# Patient Record
Sex: MALE | Race: WHITE | NOT HISPANIC OR LATINO | Employment: OTHER | RURAL
[De-identification: names, ages, dates, MRNs, and addresses within clinical notes are randomized per-mention and may not be internally consistent; named-entity substitution may affect disease eponyms.]

---

## 2023-06-07 ENCOUNTER — HOSPITAL ENCOUNTER (INPATIENT)
Facility: HOSPITAL | Age: 83
LOS: 3 days | Discharge: HOME | DRG: 683 | End: 2023-06-10
Attending: FAMILY MEDICINE | Admitting: INTERNAL MEDICINE
Payer: COMMERCIAL

## 2023-06-07 DIAGNOSIS — E87.20 METABOLIC ACIDOSIS: ICD-10-CM

## 2023-06-07 DIAGNOSIS — K29.80 DUODENITIS: ICD-10-CM

## 2023-06-07 DIAGNOSIS — N17.9 AKI (ACUTE KIDNEY INJURY): ICD-10-CM

## 2023-06-07 DIAGNOSIS — E87.5 HYPERKALEMIA: ICD-10-CM

## 2023-06-07 DIAGNOSIS — N30.01 ACUTE CYSTITIS WITH HEMATURIA: ICD-10-CM

## 2023-06-07 DIAGNOSIS — N17.9 AKI (ACUTE KIDNEY INJURY): Primary | ICD-10-CM

## 2023-06-07 DIAGNOSIS — N17.9 ACUTE KIDNEY INJURY: ICD-10-CM

## 2023-06-07 DIAGNOSIS — R13.19 ESOPHAGEAL DYSPHAGIA: ICD-10-CM

## 2023-06-07 DIAGNOSIS — I47.10 SVT (SUPRAVENTRICULAR TACHYCARDIA): ICD-10-CM

## 2023-06-07 DIAGNOSIS — K44.9 HH (HIATUS HERNIA): ICD-10-CM

## 2023-06-07 DIAGNOSIS — R00.0 TACHYCARDIA: ICD-10-CM

## 2023-06-07 DIAGNOSIS — K20.90 ESOPHAGITIS: ICD-10-CM

## 2023-06-07 DIAGNOSIS — E11.69 TYPE 2 DIABETES MELLITUS WITH OTHER SPECIFIED COMPLICATION, WITHOUT LONG-TERM CURRENT USE OF INSULIN: ICD-10-CM

## 2023-06-07 DIAGNOSIS — C80.1 CANCER: ICD-10-CM

## 2023-06-07 DIAGNOSIS — K25.3 ACUTE GASTRIC ULCER WITHOUT HEMORRHAGE OR PERFORATION: ICD-10-CM

## 2023-06-07 DIAGNOSIS — R10.9 ABDOMINAL PAIN: ICD-10-CM

## 2023-06-07 DIAGNOSIS — I10 PRIMARY HYPERTENSION: ICD-10-CM

## 2023-06-07 LAB
ALBUMIN SERPL BCP-MCNC: 3.5 G/DL (ref 3.5–5)
ALBUMIN/GLOB SERPL: 1 {RATIO}
ALP SERPL-CCNC: 101 U/L (ref 45–115)
ALT SERPL W P-5'-P-CCNC: 25 U/L (ref 16–61)
ANION GAP SERPL CALCULATED.3IONS-SCNC: 26 MMOL/L (ref 7–16)
AST SERPL W P-5'-P-CCNC: 10 U/L (ref 15–37)
BACTERIA #/AREA URNS HPF: ABNORMAL /HPF
BASOPHILS # BLD AUTO: 0.03 K/UL (ref 0–0.2)
BASOPHILS NFR BLD AUTO: 0.3 % (ref 0–1)
BILIRUB SERPL-MCNC: 0.3 MG/DL (ref ?–1.2)
BILIRUB UR QL STRIP: NEGATIVE
BUN SERPL-MCNC: 126 MG/DL (ref 7–18)
BUN/CREAT SERPL: 32 (ref 6–20)
CALCIUM SERPL-MCNC: 8.6 MG/DL (ref 8.5–10.1)
CHLORIDE SERPL-SCNC: 113 MMOL/L (ref 98–107)
CLARITY UR: ABNORMAL
CO2 SERPL-SCNC: 10 MMOL/L (ref 21–32)
COLOR UR: ABNORMAL
CREAT SERPL-MCNC: 3.95 MG/DL (ref 0.7–1.3)
DIFFERENTIAL METHOD BLD: ABNORMAL
EGFR (NO RACE VARIABLE) (RUSH/TITUS): 14 ML/MIN/1.73M2
EOSINOPHIL # BLD AUTO: 0.01 K/UL (ref 0–0.5)
EOSINOPHIL NFR BLD AUTO: 0.1 % (ref 1–4)
ERYTHROCYTE [DISTWIDTH] IN BLOOD BY AUTOMATED COUNT: 12.7 % (ref 11.5–14.5)
GLOBULIN SER-MCNC: 3.4 G/DL (ref 2–4)
GLUCOSE SERPL-MCNC: 117 MG/DL (ref 74–106)
GLUCOSE SERPL-MCNC: 316 MG/DL (ref 70–105)
GLUCOSE UR STRIP-MCNC: NORMAL MG/DL
HCO3 UR-SCNC: 8.6 MMOL/L (ref 21–28)
HCT VFR BLD AUTO: 35.6 % (ref 40–54)
HCT VFR BLD CALC: 28 % (ref 35–51)
HGB BLD-MCNC: 11.3 G/DL (ref 13.5–18)
IMM GRANULOCYTES # BLD AUTO: 0.08 K/UL (ref 0–0.04)
IMM GRANULOCYTES NFR BLD: 0.7 % (ref 0–0.4)
KETONES UR STRIP-SCNC: ABNORMAL MG/DL
LACTATE SERPL-SCNC: 1.8 MMOL/L (ref 0.4–2)
LDH SERPL L TO P-CCNC: 1.2 MMOL/L (ref 0.3–1.2)
LEUKOCYTE ESTERASE UR QL STRIP: ABNORMAL
LYMPHOCYTES # BLD AUTO: 1.79 K/UL (ref 1–4.8)
LYMPHOCYTES NFR BLD AUTO: 15.5 % (ref 27–41)
MAGNESIUM SERPL-MCNC: 2.4 MG/DL (ref 1.7–2.3)
MCH RBC QN AUTO: 30.6 PG (ref 27–31)
MCHC RBC AUTO-ENTMCNC: 31.7 G/DL (ref 32–36)
MCV RBC AUTO: 96.5 FL (ref 80–96)
MONOCYTES # BLD AUTO: 0.99 K/UL (ref 0–0.8)
MONOCYTES NFR BLD AUTO: 8.6 % (ref 2–6)
MPC BLD CALC-MCNC: 9.8 FL (ref 9.4–12.4)
MUCOUS THREADS #/AREA URNS HPF: ABNORMAL /HPF
NEUTROPHILS # BLD AUTO: 8.64 K/UL (ref 1.8–7.7)
NEUTROPHILS NFR BLD AUTO: 74.8 % (ref 53–65)
NITRITE UR QL STRIP: NEGATIVE
NRBC # BLD AUTO: 0 X10E3/UL
NRBC, AUTO (.00): 0 %
PCO2 BLDA: 21 MMHG (ref 35–48)
PH SMN: 7.22 [PH] (ref 7.35–7.45)
PH UR STRIP: 5 PH UNITS
PHOSPHATE SERPL-MCNC: 6.6 MG/DL (ref 2.5–4.5)
PLATELET # BLD AUTO: 203 K/UL (ref 150–400)
PO2 BLDA: 96 MMHG (ref 83–108)
POC BASE EXCESS: -17.4 MMOL/L (ref -2–3)
POC CO2: 9.2 MMOL/L
POC IONIZED CALCIUM: 1.33 MMOL/L (ref 1.15–1.35)
POC SATURATED O2: 96 % (ref 95–98)
POCT GLUCOSE: 237 MG/DL (ref 60–95)
POTASSIUM BLD-SCNC: 5.8 MMOL/L (ref 3.4–4.5)
POTASSIUM SERPL-SCNC: 6.8 MMOL/L (ref 3.5–5.1)
PROT SERPL-MCNC: 6.9 G/DL (ref 6.4–8.2)
PROT UR QL STRIP: 30
RBC # BLD AUTO: 3.69 M/UL (ref 4.6–6.2)
RBC # UR STRIP: NEGATIVE /UL
RBC #/AREA URNS HPF: ABNORMAL /HPF
SODIUM BLD-SCNC: 138 MMOL/L (ref 136–145)
SODIUM SERPL-SCNC: 142 MMOL/L (ref 136–145)
SP GR UR STRIP: 1.01
SQUAMOUS #/AREA URNS LPF: ABNORMAL /LPF
TRICHOMONAS #/AREA URNS HPF: ABNORMAL /HPF
UROBILINOGEN UR STRIP-ACNC: NORMAL MG/DL
WBC # BLD AUTO: 11.54 K/UL (ref 4.5–11)
WBC #/AREA URNS HPF: ABNORMAL /HPF
YEAST #/AREA URNS HPF: ABNORMAL /HPF

## 2023-06-07 PROCEDURE — 84132 ASSAY OF SERUM POTASSIUM: CPT

## 2023-06-07 PROCEDURE — 93010 EKG 12-LEAD: ICD-10-PCS | Mod: ,,, | Performed by: HOSPITALIST

## 2023-06-07 PROCEDURE — 87086 URINE CULTURE/COLONY COUNT: CPT | Performed by: FAMILY MEDICINE

## 2023-06-07 PROCEDURE — 82330 ASSAY OF CALCIUM: CPT

## 2023-06-07 PROCEDURE — 80053 COMPREHEN METABOLIC PANEL: CPT | Performed by: FAMILY MEDICINE

## 2023-06-07 PROCEDURE — 96375 TX/PRO/DX INJ NEW DRUG ADDON: CPT

## 2023-06-07 PROCEDURE — 82962 GLUCOSE BLOOD TEST: CPT

## 2023-06-07 PROCEDURE — 83605 ASSAY OF LACTIC ACID: CPT

## 2023-06-07 PROCEDURE — 11000001 HC ACUTE MED/SURG PRIVATE ROOM

## 2023-06-07 PROCEDURE — 99223 1ST HOSP IP/OBS HIGH 75: CPT | Mod: ,,, | Performed by: HOSPITALIST

## 2023-06-07 PROCEDURE — 96374 THER/PROPH/DIAG INJ IV PUSH: CPT

## 2023-06-07 PROCEDURE — 81001 URINALYSIS AUTO W/SCOPE: CPT | Performed by: FAMILY MEDICINE

## 2023-06-07 PROCEDURE — 99284 EMERGENCY DEPT VISIT MOD MDM: CPT | Mod: ,,, | Performed by: FAMILY MEDICINE

## 2023-06-07 PROCEDURE — 85025 COMPLETE CBC W/AUTO DIFF WBC: CPT | Performed by: FAMILY MEDICINE

## 2023-06-07 PROCEDURE — 99284 PR EMERGENCY DEPT VISIT,LEVEL IV: ICD-10-PCS | Mod: ,,, | Performed by: FAMILY MEDICINE

## 2023-06-07 PROCEDURE — 84295 ASSAY OF SERUM SODIUM: CPT

## 2023-06-07 PROCEDURE — 83605 ASSAY OF LACTIC ACID: CPT | Performed by: FAMILY MEDICINE

## 2023-06-07 PROCEDURE — 82803 BLOOD GASES ANY COMBINATION: CPT

## 2023-06-07 PROCEDURE — 99223 PR INITIAL HOSPITAL CARE,LEVL III: ICD-10-PCS | Mod: ,,, | Performed by: HOSPITALIST

## 2023-06-07 PROCEDURE — 85014 HEMATOCRIT: CPT

## 2023-06-07 PROCEDURE — 87040 BLOOD CULTURE FOR BACTERIA: CPT | Performed by: FAMILY MEDICINE

## 2023-06-07 PROCEDURE — 63600175 PHARM REV CODE 636 W HCPCS: Performed by: FAMILY MEDICINE

## 2023-06-07 PROCEDURE — 84100 ASSAY OF PHOSPHORUS: CPT | Performed by: HOSPITALIST

## 2023-06-07 PROCEDURE — 87077 CULTURE AEROBIC IDENTIFY: CPT | Performed by: FAMILY MEDICINE

## 2023-06-07 PROCEDURE — 25000003 PHARM REV CODE 250: Performed by: FAMILY MEDICINE

## 2023-06-07 PROCEDURE — 82947 ASSAY GLUCOSE BLOOD QUANT: CPT

## 2023-06-07 PROCEDURE — 83735 ASSAY OF MAGNESIUM: CPT | Performed by: HOSPITALIST

## 2023-06-07 PROCEDURE — 93005 ELECTROCARDIOGRAM TRACING: CPT

## 2023-06-07 PROCEDURE — 99285 EMERGENCY DEPT VISIT HI MDM: CPT | Mod: 25

## 2023-06-07 PROCEDURE — 93010 ELECTROCARDIOGRAM REPORT: CPT | Mod: ,,, | Performed by: HOSPITALIST

## 2023-06-07 RX ORDER — TRAZODONE HYDROCHLORIDE 50 MG/1
50 TABLET ORAL NIGHTLY PRN
Status: DISCONTINUED | OUTPATIENT
Start: 2023-06-08 | End: 2023-06-10 | Stop reason: HOSPADM

## 2023-06-07 RX ORDER — AMLODIPINE BESYLATE 10 MG/1
10 TABLET ORAL DAILY
COMMUNITY

## 2023-06-07 RX ORDER — ENALAPRIL MALEATE 10 MG/1
10 TABLET ORAL DAILY
Status: ON HOLD | COMMUNITY
End: 2023-06-10 | Stop reason: HOSPADM

## 2023-06-07 RX ORDER — NIACIN 500 MG
250 CAPSULE, EXTENDED RELEASE ORAL NIGHTLY
COMMUNITY

## 2023-06-07 RX ORDER — BISACODYL 5 MG
10 TABLET, DELAYED RELEASE (ENTERIC COATED) ORAL DAILY PRN
Status: DISCONTINUED | OUTPATIENT
Start: 2023-06-08 | End: 2023-06-10 | Stop reason: HOSPADM

## 2023-06-07 RX ORDER — CALCIUM GLUCONATE 20 MG/ML
1 INJECTION, SOLUTION INTRAVENOUS ONCE
Status: COMPLETED | OUTPATIENT
Start: 2023-06-07 | End: 2023-06-07

## 2023-06-07 RX ORDER — AMLODIPINE BESYLATE 10 MG/1
10 TABLET ORAL DAILY
Status: DISCONTINUED | OUTPATIENT
Start: 2023-06-08 | End: 2023-06-09

## 2023-06-07 RX ORDER — SODIUM BICARBONATE 1 MEQ/ML
50 SYRINGE (ML) INTRAVENOUS
Status: COMPLETED | OUTPATIENT
Start: 2023-06-07 | End: 2023-06-07

## 2023-06-07 RX ORDER — ASPIRIN 81 MG/1
81 TABLET ORAL DAILY
COMMUNITY

## 2023-06-07 RX ORDER — SIMETHICONE 80 MG
1 TABLET,CHEWABLE ORAL 3 TIMES DAILY PRN
Status: DISCONTINUED | OUTPATIENT
Start: 2023-06-08 | End: 2023-06-10 | Stop reason: HOSPADM

## 2023-06-07 RX ORDER — EZETIMIBE 10 MG/1
10 TABLET ORAL DAILY
COMMUNITY

## 2023-06-07 RX ORDER — ACETAMINOPHEN 500 MG
1000 TABLET ORAL EVERY 6 HOURS PRN
Status: DISCONTINUED | OUTPATIENT
Start: 2023-06-08 | End: 2023-06-10 | Stop reason: HOSPADM

## 2023-06-07 RX ORDER — ONDANSETRON 2 MG/ML
8 INJECTION INTRAMUSCULAR; INTRAVENOUS EVERY 6 HOURS PRN
Status: DISCONTINUED | OUTPATIENT
Start: 2023-06-08 | End: 2023-06-10 | Stop reason: HOSPADM

## 2023-06-07 RX ORDER — CALCIUM GLUCONATE 98 MG/ML
1 INJECTION, SOLUTION INTRAVENOUS ONCE
Status: DISCONTINUED | OUTPATIENT
Start: 2023-06-07 | End: 2023-06-07

## 2023-06-07 RX ORDER — ATENOLOL 50 MG/1
50 TABLET ORAL DAILY
COMMUNITY

## 2023-06-07 RX ORDER — GUAIFENESIN/DEXTROMETHORPHAN 100-10MG/5
10 SYRUP ORAL EVERY 6 HOURS PRN
Status: DISCONTINUED | OUTPATIENT
Start: 2023-06-08 | End: 2023-06-10 | Stop reason: HOSPADM

## 2023-06-07 RX ORDER — METFORMIN HYDROCHLORIDE 500 MG/1
500 TABLET ORAL 2 TIMES DAILY WITH MEALS
Status: ON HOLD | COMMUNITY
End: 2023-06-10 | Stop reason: HOSPADM

## 2023-06-07 RX ORDER — ATORVASTATIN CALCIUM 40 MG/1
40 TABLET, FILM COATED ORAL DAILY
COMMUNITY

## 2023-06-07 RX ADMIN — HUMAN INSULIN 10 UNITS: 100 INJECTION, SOLUTION SUBCUTANEOUS at 08:06

## 2023-06-07 RX ADMIN — DEXTROSE MONOHYDRATE 50 G: 25 INJECTION, SOLUTION INTRAVENOUS at 08:06

## 2023-06-07 RX ADMIN — SODIUM CHLORIDE 1000 ML: 9 INJECTION, SOLUTION INTRAVENOUS at 08:06

## 2023-06-07 RX ADMIN — SODIUM BICARBONATE 50 MEQ: 84 INJECTION, SOLUTION INTRAVENOUS at 11:06

## 2023-06-07 RX ADMIN — CALCIUM GLUCONATE 1 G: 20 INJECTION, SOLUTION INTRAVENOUS at 08:06

## 2023-06-07 RX ADMIN — PIPERACILLIN AND TAZOBACTAM 4.5 G: 4; .5 INJECTION, POWDER, FOR SOLUTION INTRAVENOUS; PARENTERAL at 11:06

## 2023-06-08 LAB
ANION GAP SERPL CALCULATED.3IONS-SCNC: 23 MMOL/L (ref 7–16)
APTT PPP: 24.7 SECONDS (ref 25.2–37.3)
BASOPHILS # BLD AUTO: 0.02 K/UL (ref 0–0.2)
BASOPHILS NFR BLD AUTO: 0.2 % (ref 0–1)
BUN SERPL-MCNC: 106 MG/DL (ref 7–18)
BUN/CREAT SERPL: 31 (ref 6–20)
CALCIUM SERPL-MCNC: 8.3 MG/DL (ref 8.5–10.1)
CHLORIDE SERPL-SCNC: 112 MMOL/L (ref 98–107)
CHOLEST SERPL-MCNC: 77 MG/DL (ref 0–200)
CHOLEST/HDLC SERPL: 2.9 {RATIO}
CK SERPL-CCNC: 111 U/L (ref 39–308)
CO2 SERPL-SCNC: 17 MMOL/L (ref 21–32)
CREAT SERPL-MCNC: 3.39 MG/DL (ref 0.7–1.3)
CREAT UR-MCNC: 111 MG/DL (ref 39–259)
DIFFERENTIAL METHOD BLD: ABNORMAL
EGFR (NO RACE VARIABLE) (RUSH/TITUS): 17 ML/MIN/1.73M2
EOSINOPHIL # BLD AUTO: 0.02 K/UL (ref 0–0.5)
EOSINOPHIL NFR BLD AUTO: 0.2 % (ref 1–4)
ERYTHROCYTE [DISTWIDTH] IN BLOOD BY AUTOMATED COUNT: 12.7 % (ref 11.5–14.5)
EST. AVERAGE GLUCOSE BLD GHB EST-MCNC: 94 MG/DL
GLUCOSE SERPL-MCNC: 180 MG/DL (ref 74–106)
GLUCOSE SERPL-MCNC: 184 MG/DL (ref 70–105)
HBA1C MFR BLD HPLC: 5.4 % (ref 4.5–6.6)
HCT VFR BLD AUTO: 31.3 % (ref 40–54)
HDLC SERPL-MCNC: 27 MG/DL (ref 40–60)
HGB BLD-MCNC: 10.3 G/DL (ref 13.5–18)
IMM GRANULOCYTES # BLD AUTO: 0.08 K/UL (ref 0–0.04)
IMM GRANULOCYTES NFR BLD: 0.8 % (ref 0–0.4)
INR BLD: 1.21
LDLC SERPL CALC-MCNC: 12 MG/DL
LYMPHOCYTES # BLD AUTO: 1.2 K/UL (ref 1–4.8)
LYMPHOCYTES NFR BLD AUTO: 12.7 % (ref 27–41)
MCH RBC QN AUTO: 30.6 PG (ref 27–31)
MCHC RBC AUTO-ENTMCNC: 32.9 G/DL (ref 32–36)
MCV RBC AUTO: 92.9 FL (ref 80–96)
MONOCYTES # BLD AUTO: 1.09 K/UL (ref 0–0.8)
MONOCYTES NFR BLD AUTO: 11.5 % (ref 2–6)
MPC BLD CALC-MCNC: 10 FL (ref 9.4–12.4)
NEUTROPHILS # BLD AUTO: 7.05 K/UL (ref 1.8–7.7)
NEUTROPHILS NFR BLD AUTO: 74.6 % (ref 53–65)
NONHDLC SERPL-MCNC: 50 MG/DL
NRBC # BLD AUTO: 0 X10E3/UL
NRBC, AUTO (.00): 0 %
PLATELET # BLD AUTO: 183 K/UL (ref 150–400)
POTASSIUM SERPL-SCNC: 4.8 MMOL/L (ref 3.5–5.1)
PROTHROMBIN TIME: 14.8 SECONDS (ref 11.7–14.7)
RBC # BLD AUTO: 3.37 M/UL (ref 4.6–6.2)
SODIUM SERPL-SCNC: 147 MMOL/L (ref 136–145)
SODIUM UR-SCNC: 13 MMOL/L (ref 40–220)
TRIGL SERPL-MCNC: 189 MG/DL (ref 35–150)
VLDLC SERPL-MCNC: 38 MG/DL
WBC # BLD AUTO: 9.46 K/UL (ref 4.5–11)

## 2023-06-08 PROCEDURE — 85730 THROMBOPLASTIN TIME PARTIAL: CPT | Performed by: HOSPITALIST

## 2023-06-08 PROCEDURE — 84300 ASSAY OF URINE SODIUM: CPT | Performed by: HOSPITALIST

## 2023-06-08 PROCEDURE — 80061 LIPID PANEL: CPT | Performed by: HOSPITALIST

## 2023-06-08 PROCEDURE — 80048 BASIC METABOLIC PNL TOTAL CA: CPT | Performed by: HOSPITALIST

## 2023-06-08 PROCEDURE — 82550 ASSAY OF CK (CPK): CPT | Performed by: HOSPITALIST

## 2023-06-08 PROCEDURE — 99232 PR SUBSEQUENT HOSPITAL CARE,LEVL II: ICD-10-PCS | Mod: ,,, | Performed by: INTERNAL MEDICINE

## 2023-06-08 PROCEDURE — 99232 SBSQ HOSP IP/OBS MODERATE 35: CPT | Mod: ,,, | Performed by: INTERNAL MEDICINE

## 2023-06-08 PROCEDURE — 83036 HEMOGLOBIN GLYCOSYLATED A1C: CPT | Performed by: HOSPITALIST

## 2023-06-08 PROCEDURE — 82962 GLUCOSE BLOOD TEST: CPT

## 2023-06-08 PROCEDURE — 11000001 HC ACUTE MED/SURG PRIVATE ROOM

## 2023-06-08 PROCEDURE — 25000003 PHARM REV CODE 250: Performed by: HOSPITALIST

## 2023-06-08 PROCEDURE — 63600175 PHARM REV CODE 636 W HCPCS: Performed by: HOSPITALIST

## 2023-06-08 PROCEDURE — 82570 ASSAY OF URINE CREATININE: CPT | Performed by: HOSPITALIST

## 2023-06-08 PROCEDURE — 85610 PROTHROMBIN TIME: CPT | Performed by: HOSPITALIST

## 2023-06-08 PROCEDURE — 85025 COMPLETE CBC W/AUTO DIFF WBC: CPT | Performed by: HOSPITALIST

## 2023-06-08 RX ADMIN — SODIUM BICARBONATE: 84 INJECTION, SOLUTION INTRAVENOUS at 12:06

## 2023-06-08 RX ADMIN — SODIUM ZIRCONIUM CYCLOSILICATE 10 G: 10 POWDER, FOR SUSPENSION ORAL at 12:06

## 2023-06-08 RX ADMIN — CEFTRIAXONE SODIUM 1 G: 1 INJECTION, POWDER, FOR SOLUTION INTRAMUSCULAR; INTRAVENOUS at 10:06

## 2023-06-08 RX ADMIN — SODIUM BICARBONATE: 84 INJECTION, SOLUTION INTRAVENOUS at 11:06

## 2023-06-08 RX ADMIN — SODIUM BICARBONATE: 84 INJECTION, SOLUTION INTRAVENOUS at 10:06

## 2023-06-08 NOTE — H&P
[facility] - Emergency Department  Hospital Medicine  History & Physical    Patient Name: Rodriguez Goddard  MRN: [id number]  Patient Class: IP- Inpatient  Admission Date: 6/7/2023  Attending Physician: Reg Chisholm MD   Primary Care Provider: [facility]       Patient information was obtained from patient, spouse/SO and ER records.     Subjective:     Principal Problem:LATONIA (acute kidney injury)    Chief Complaint:   Chief Complaint   Patient presents with    Abdominal Pain    Constipation        HPI: 84 yo M presents to the ED for constipation.  He states he has not had a BM in nearly two weels.  He denies abdominal pain.  He says he just doesn't feel well.  His wife says he has been fatigued with decreased appetite for the past two weeks and she says he was too week today to get out of bed.  He looks much better now that he has gotten some IVF.  He did have an abdominal CT wo contrast which did not show an obstruction of impaction nor hydronephrosis but renal lesions noted.  Renal US ordered to help differentiate solid vs cystic lesions.      Patient states he was told ten years ago at [facility] by Dr. Ramirez that he had renal cancer and a nephrectomy was recommended.  He left and never went back.  He found a new PCP at the health department in [place] that would fill his prescriptions and he has not been back to a doctor since that time.  He did have radioactive seed implants for prostate cancer placed at [facility] 20 years ago and says he has been fine since that time and says at his age he did not want aggressive measures anyway.  Today he has hematuria and possible UTI.  Lactic acid 1.7.  Was given zosyn in the ED and cultured.      Patient has marked prerenal azotemia hyperkalemia and metabolic acidosis.  Will order urine na and urine creat to calculate FeNa.      ROS below but also states his decreased oral intake is due to dysphagia.  He says for the past few weeks  he has even had trouble swallowing water and that he has not taken his meds in nearly two weeks.  He feels they get stuck in his throat.  Important to mention is he has been a smoker for 65 years but quit recently.        Past Medical History:   Diagnosis Date    Cancer     prostate diagnosed 2013    Diabetes mellitus     Hypertension        Past Surgical History:   Procedure Laterality Date    RADIOACTIVE SEED IMPLANTATION OF PROSTATE         Review of patient's allergies indicates:  No Known Allergies    No current facility-administered medications on file prior to encounter.     Current Outpatient Medications on File Prior to Encounter   Medication Sig    albuterol sulfate (PROAIR RESPICLICK) 90 mcg/actuation inhaler Inhale 2 puffs into the lungs every 6 (six) hours as needed for Wheezing. Rescue    amLODIPine (NORVASC) 10 MG tablet Take 10 mg by mouth once daily.    aspirin (ECOTRIN) 81 MG EC tablet Take 81 mg by mouth once daily.    atenoloL (TENORMIN) 50 MG tablet Take 50 mg by mouth once daily.    atorvastatin (LIPITOR) 40 MG tablet Take 40 mg by mouth once daily.    enalapril (VASOTEC) 10 MG tablet Take 10 mg by mouth once daily.    ezetimibe (ZETIA) 10 mg tablet Take 10 mg by mouth once daily.    metFORMIN (GLUCOPHAGE) 500 MG tablet Take 500 mg by mouth 2 (two) times daily with meals.    niacin 500 MG CpSR Take 250 mg by mouth every evening.     Family History    None       Tobacco Use    Smoking status: Former     Types: Cigarettes    Smokeless tobacco: Never   Substance and Sexual Activity    Alcohol use: Never    Drug use: Never    Sexual activity: Not Currently     Review of Systems   Constitutional:  Positive for appetite change and fatigue. Negative for chills, fever and unexpected weight change.   HENT:  Positive for trouble swallowing. Negative for congestion, mouth sores, nosebleeds, sinus pain and sore throat.    Eyes:  Positive for visual disturbance.   Respiratory:  Negative for apnea, cough,  chest tightness and shortness of breath.    Cardiovascular:  Negative for chest pain, palpitations and leg swelling.   Gastrointestinal:  Negative for abdominal pain, blood in stool, constipation, diarrhea, nausea and vomiting.   Endocrine: Negative for polydipsia and polyuria.   Genitourinary:  Negative for decreased urine volume, difficulty urinating, dysuria, flank pain and frequency.   Musculoskeletal:  Negative for arthralgias, back pain and neck pain.   Skin:  Negative for rash.   Neurological:  Negative for tremors, syncope, light-headedness and headaches.   Hematological:  Does not bruise/bleed easily.   Psychiatric/Behavioral:  Negative for confusion and suicidal ideas.    Objective:     Vital Signs (Most Recent):  Temp: 97.8 °F (36.6 °C) (06/07/23 1910)  Pulse: 84 (06/07/23 2355)  Resp: 20 (06/07/23 2355)  BP: 127/83 (06/07/23 2355)  SpO2: 95 % (06/07/23 2355) Vital Signs (24h Range):  Temp:  [97.8 °F (36.6 °C)] 97.8 °F (36.6 °C)  Pulse:  [60-88] 84  Resp:  [17-25] 20  SpO2:  [95 %-97 %] 95 %  BP: (121-148)/(46-83) 127/83        There is no height or weight on file to calculate BMI.     Physical Exam  Vitals and nursing note reviewed. Exam conducted with a chaperone present.   Constitutional:       Appearance: Normal appearance.   HENT:      Head: Atraumatic.      Mouth/Throat:      Mouth: Mucous membranes are moist.      Pharynx: Oropharynx is clear.   Eyes:      Conjunctiva/sclera: Conjunctivae normal.      Pupils: Pupils are equal, round, and reactive to light.   Neck:      Vascular: No carotid bruit.   Cardiovascular:      Rate and Rhythm: Normal rate and regular rhythm.      Pulses: Normal pulses.      Heart sounds: Normal heart sounds.   Pulmonary:      Effort: Pulmonary effort is normal.      Breath sounds: Normal breath sounds.   Abdominal:      General: Abdomen is flat. Bowel sounds are normal. There is no distension.      Palpations: Abdomen is soft.      Tenderness: There is no abdominal  tenderness. There is no guarding or rebound.   Musculoskeletal:         General: No tenderness, deformity or signs of injury. Normal range of motion.      Cervical back: Neck supple.      Right lower leg: No edema.      Left lower leg: No edema.   Skin:     General: Skin is warm and dry.      Capillary Refill: Capillary refill takes less than 2 seconds.      Coloration: Skin is not jaundiced or pale.      Findings: No bruising, lesion or rash.   Neurological:      General: No focal deficit present.      Mental Status: He is alert and oriented to person, place, and time.   Psychiatric:         Mood and Affect: Mood normal.            CRANIAL NERVES     CN III, IV, VI   Pupils are equal, round, and reactive to light.     Significant Labs: All pertinent labs within the past 24 hours have been reviewed.    Significant Imaging: I have reviewed all pertinent imaging results/findings within the past 24 hours.    Assessment/Plan:     * LATONIA (acute kidney injury)  Patient with acute kidney injury likely due to pre-renal azotemia LATONIA is currently stable. Labs reviewed- Renal function/electrolytes with CrCl cannot be calculated (Unknown ideal weight.). according to latest data. Monitor urine output and serial BMP and adjust therapy as needed. Avoid nephrotoxins and renally dose meds for GFR listed above.     Causing metabolic acidosis and hyperkalemia which are being treated.    CT did not show hydronephrosis but did have renal lesions and could not r/o solid mass  Renal US pending.      Acute cystitis with hematuria  Start IV rocephin and follow culture.    Renal US pending.    Renal cell carcinoma suspected.      Dysphagia  Will consult GI to liam for endoscopy.        Hyperlipidemia  Will check lipid in AM  Holding zetia and statin at this time  Will check CK in am      Cancer  Treated at [facility] 20 years ago.   Has not had to follow up in ten years.        Diabetes mellitus  Patient's FSGs are uncontrolled due  to hyperglycemia on current medication regimen.  Last A1c reviewed- No results found for: LABA1C, HGBA1C  Most recent fingerstick glucose reviewed- Recent Labs   Lab 06/07/23  2246   POCTGLUCOSE 237*     Current correctional scale  Medium  Increase anti-hyperglycemic dose as follows-   Antihyperglycemics (From admission, onward)      None          Hold Oral hypoglycemics while patient is in the hospital.    Hypertension  Continue norvasc   Hold BB HR 62      Metabolic acidosis  Bicarb infusion.    Stop metformin  Lactic acid 1.8        Hyperkalemia  Augustin's cocktail given in ED along with calcium gluconate  EKG without significant abnormalities  Lokelma 10 mg loading dose  Recheck K in am and may need continue dosing.    Monitor on telemetry.          VTE Risk Mitigation (From admission, onward)      CARMENZA  no anticoagulation due to hematuria                         Es Pan MD  Department of Hospital Medicine  [facility] - Emergency Department

## 2023-06-08 NOTE — ASSESSMENT & PLAN NOTE
Patient's FSGs are uncontrolled due to hyperglycemia on current medication regimen.  Last A1c reviewed- No results found for: LABA1C, HGBA1C  Most recent fingerstick glucose reviewed- Recent Labs   Lab 06/07/23  2246   POCTGLUCOSE 237*     Current correctional scale  Medium  Increase anti-hyperglycemic dose as follows-   Antihyperglycemics (From admission, onward)    None        Hold Oral hypoglycemics while patient is in the hospital.

## 2023-06-08 NOTE — HOSPITAL COURSE
06/08/2023   Patient has no new complaints, abdominal pain is improving.    Still feels weak all over.    Patient was told to have renal cell carcinoma and needed nephrectomy 10 years ago, patient declined at that time.    Patient has history of prostate cancer status post radioactive seeds implants 20 years ago.  PTT   Vital signs are stable, he is afebrile.    Kidney ultrasound showed solid mass located within the inferior pole of the left kidney measuring 4.3 cm.    Urine culture Gram-negative bacilli, blood cultures negative to date.    White count 9 hemoglobin 10 platelets 183 INR 1.2 sodium 147 bicarb 17 up from 10.    Creatinine trending down 3.3 today.    Glucose 180.    HDL 27 triglyceride 189 LDL 12 PTT   Lactic acid 1.8.    Urine sodium 13 and urine creatinine 111.     Patient acute on chronic renal failure likely related to intravascular volume depletion/prerenal, fraction excretion of sodium is 0.3% .  We will continue IV fluids hydration.  Avoid nephrotoxic drugs   Monitor input output charting closely.  Metabolic acidosis with normal lactic acidosis likely related to renal failure, no evidence of sepsis, continue bicarb infusion continue to monitor closely, clinically improving.  We will continue Rocephin and we will follow urine culture results.    We will consult PT/OT.  Left renal cell carcinoma, declined surgery in the past, consulted Urology for their input.    Patient code status DNR   Continue same current management     06/09/2023   Patient was seen earlier this morning, he was feeling fine, no chest pain shortness a breath and he is requesting to go home.    Blood pressure 120/80 heart rate heart rate 66 afebrile.    White count down to 8 hemoglobin 9 platelets count was having a to the OR by capacity 147 with an 1152 with iron saturation 55.    Creatinine down to 2.1 with glucose of 125.    Urine culture grew Klebsiella pneumoniae sensitive to ceftriaxone.     patient was seen and evaluated  by Dr. Underwood, recommended medical management with oral antibiotics, no over invasive measures..    Will do swallowing eval.    One continue with dietitian recommendations.    Shortly after he was seen patient went into SVT, heart rate of 160, stable with blood pressure 130/86, received 2 doses of adenosine with no change in SVT, given Lopressor 5 mg IV push and his heart rate down to 70.  Patient was on beta blockers at home.    I will start him on Lopressor 50 p.o. b.i.d. and I will give him a bolus of fluids.    Continue close monitoring.    Echo ordered   Renal function improving.  Discussed with his wife at bedside.    06/10/2023   Patient feels much better, no chest pain no shortness of breath, no more episodes of SVT since yesterday morning, apparently patient was not getting his beta blockers while in the hospital, started back on beta blockers, his heart rate is normal and he is in normal sinus rhythm.    No abdominal pain no nausea no vomiting.    His white blood cell count 7 hemoglobin 9 platelet count 148 sodium 142 potassium 3.2 creatinine down to 1.7 glucose 135 calcium 7.9.    Echocardiogram showed ejection fraction 55% no AFib.  No valvular heart disease.    Urine culture grew Klebsiella pneumoniae.    Patient will be discharged home on cefuroxime 250 mg p.o. b.i.d. for 7 more days.    Discontinued ACE inhibitors due to renal failure.    Renal function improved with hydration, creatinine down to baseline.  We will discontinue metformin due to renal impairment.  We will start low-dose glipizide for diabetes control.  Discussed with Dr. Underwood, patient himself as well does not want to have any invasive procedures done.   Patient had EGD with esophageal dilatation, dysphagia resolved.  Tolerating diet well.    Needs to follow up with PCP as outpatient.

## 2023-06-08 NOTE — ASSESSMENT & PLAN NOTE
Patient with acute kidney injury likely due to pre-renal azotemia LATONIA is currently stable. Labs reviewed- Renal function/electrolytes with CrCl cannot be calculated (Unknown ideal weight.). according to latest data. Monitor urine output and serial BMP and adjust therapy as needed. Avoid nephrotoxins and renally dose meds for GFR listed above.     Causing metabolic acidosis and hyperkalemia which are being treated.    CT did not show hydronephrosis but did have renal lesions and could not r/o solid mass  Renal US pending.

## 2023-06-08 NOTE — PROGRESS NOTES
[facility] - Emergency Department  Hospital Medicine  Progress Note    Patient Name: Darron Goddard  MRN: [id number]  Patient Class: IP- Inpatient   Admission Date: 6/7/2023  Length of Stay: 1 days  Attending Physician: Reg Chisholm MD  Primary Care Provider: Primary Doctor No        Subjective:     Principal Problem:LATONIA (acute kidney injury)        HPI:  84 yo M presents to the ED for constipation.  He states he has not had a BM in nearly two weels.  He denies abdominal pain.  He says he just doesn't feel well.  His wife says he has been fatigued with decreased appetite for the past two weeks and she says he was too week today to get out of bed.  He looks much better now that he has gotten some IVF.  He did have an abdominal CT wo contrast which did not show an obstruction of impaction nor hydronephrosis but renal lesions noted.  Renal US ordered to help differentiate solid vs cystic lesions.      Patient states he was told ten years ago at [facility] by Dr. Ramirez that he had renal cancer and a nephrectomy was recommended.  He left and never went back.  He found a new PCP at the health department in [place] that would fill his prescriptions and he has not been back to a doctor since that time.  He did have radioactive seed implants for prostate cancer placed at [facility] 20 years ago and says he has been fine since that time and says at his age he did not want aggressive measures anyway.  Today he has hematuria and possible UTI.  Lactic acid 1.7.  Was given zosyn in the ED and cultured.      Patient has marked prerenal azotemia hyperkalemia and metabolic acidosis.  Will order urine na and urine creat to calculate FeNa.      ROS below but also states his decreased oral intake is due to dysphagia.  He says for the past few weeks he has even had trouble swallowing water and that he has not taken his meds in nearly two weeks.  He feels they get stuck in his throat.  Important to  mention is he has been a smoker for 65 years but quit recently.        Overview/Hospital Course:  06/08/2023   Patient has no new complaints, abdominal pain is improving.    Still feels weak all over.    Patient was told to have renal cell carcinoma and needed nephrectomy 10 years ago, patient declined at that time.    Patient has history of prostate cancer status post radioactive seeds implants 20 years ago.  PTT   Vital signs are stable, he is afebrile.    Kidney ultrasound showed solid mass located within the inferior pole of the left kidney measuring 4.3 cm.    Urine culture Gram-negative bacilli, blood cultures negative to date.    White count 9 hemoglobin 10 platelets 183 INR 1.2 sodium 147 bicarb 17 up from 10.    Creatinine trending down 3.3 today.    Glucose 180.    HDL 27 triglyceride 189 LDL 12 PTT   Lactic acid 1.8.    Urine sodium 13 and urine creatinine 111.     Patient acute on chronic renal failure likely related to intravascular volume depletion/prerenal, fraction excretion of sodium is 0.3% .  We will continue IV fluids hydration.  Avoid nephrotoxic drugs   Monitor input output charting closely.  Metabolic acidosis with normal lactic acidosis likely related to renal failure, no evidence of sepsis, continue bicarb infusion continue to monitor closely, clinically improving.  We will continue Rocephin and we will follow urine culture results.    We will consult PT/OT.  Left renal cell carcinoma, declined surgery in the past, consulted Urology for their input.    Patient code status DNR   Continue same current management           Past Medical History:   Diagnosis Date    Cancer     prostate diagnosed 2013    Diabetes mellitus     Hypertension        Past Surgical History:   Procedure Laterality Date    RADIOACTIVE SEED IMPLANTATION OF PROSTATE         Review of patient's allergies indicates:  No Known Allergies    No current facility-administered medications on file prior to encounter.     Current  Outpatient Medications on File Prior to Encounter   Medication Sig    albuterol sulfate (PROAIR RESPICLICK) 90 mcg/actuation inhaler Inhale 2 puffs into the lungs every 6 (six) hours as needed for Wheezing. Rescue    amLODIPine (NORVASC) 10 MG tablet Take 10 mg by mouth once daily.    aspirin (ECOTRIN) 81 MG EC tablet Take 81 mg by mouth once daily.    atenoloL (TENORMIN) 50 MG tablet Take 50 mg by mouth once daily.    atorvastatin (LIPITOR) 40 MG tablet Take 40 mg by mouth once daily.    enalapril (VASOTEC) 10 MG tablet Take 10 mg by mouth once daily.    ezetimibe (ZETIA) 10 mg tablet Take 10 mg by mouth once daily.    metFORMIN (GLUCOPHAGE) 500 MG tablet Take 500 mg by mouth 2 (two) times daily with meals.    niacin 500 MG CpSR Take 250 mg by mouth every evening.     Family History    None       Tobacco Use    Smoking status: Former     Types: Cigarettes    Smokeless tobacco: Never   Substance and Sexual Activity    Alcohol use: Never    Drug use: Never    Sexual activity: Not Currently     Review of Systems   Constitutional:  Positive for appetite change and fatigue. Negative for chills, fever and unexpected weight change.   HENT:  Positive for trouble swallowing. Negative for congestion, mouth sores, nosebleeds, sinus pain and sore throat.    Eyes:  Positive for visual disturbance.   Respiratory:  Negative for apnea, cough, chest tightness and shortness of breath.    Cardiovascular:  Negative for chest pain, palpitations and leg swelling.   Gastrointestinal:  Negative for abdominal pain, blood in stool, constipation, diarrhea, nausea and vomiting.   Endocrine: Negative for polydipsia and polyuria.   Genitourinary:  Negative for decreased urine volume, difficulty urinating, dysuria, flank pain and frequency.   Musculoskeletal:  Negative for arthralgias, back pain and neck pain.   Skin:  Negative for rash.   Neurological:  Negative for tremors, syncope, light-headedness and headaches.    Hematological:  Does not bruise/bleed easily.   Psychiatric/Behavioral:  Negative for confusion and suicidal ideas.    Objective:     Vital Signs (Most Recent):  Temp: 97.8 °F (36.6 °C) (06/07/23 1910)  Pulse: 78 (06/08/23 1010)  Resp: (!) 24 (06/08/23 0455)  BP: (!) 123/40 (06/08/23 1000)  SpO2: (!) 94 % (06/08/23 1010) Vital Signs (24h Range):  Temp:  [97.8 °F (36.6 °C)] 97.8 °F (36.6 °C)  Pulse:  [54-88] 78  Resp:  [17-25] 24  SpO2:  [89 %-97 %] 94 %  BP: (107-148)/(36-83) 123/40        There is no height or weight on file to calculate BMI.     Physical Exam  Vitals and nursing note reviewed. Exam conducted with a chaperone present.   Constitutional:       Appearance: Normal appearance.   HENT:      Head: Atraumatic.      Mouth/Throat:      Mouth: Mucous membranes are moist.      Pharynx: Oropharynx is clear.   Eyes:      Conjunctiva/sclera: Conjunctivae normal.      Pupils: Pupils are equal, round, and reactive to light.   Neck:      Vascular: No carotid bruit.   Cardiovascular:      Rate and Rhythm: Normal rate and regular rhythm.      Pulses: Normal pulses.      Heart sounds: Normal heart sounds.   Pulmonary:      Effort: Pulmonary effort is normal.      Breath sounds: Normal breath sounds.   Abdominal:      General: Abdomen is flat. Bowel sounds are normal. There is no distension.      Palpations: Abdomen is soft.      Tenderness: There is no abdominal tenderness. There is no guarding or rebound.   Musculoskeletal:         General: No tenderness, deformity or signs of injury. Normal range of motion.      Cervical back: Neck supple.      Right lower leg: No edema.      Left lower leg: No edema.   Skin:     General: Skin is warm and dry.      Capillary Refill: Capillary refill takes less than 2 seconds.      Coloration: Skin is not jaundiced or pale.      Findings: No bruising, lesion or rash.   Neurological:      General: No focal deficit present.      Mental Status: He is alert and oriented to person,  place, and time.   Psychiatric:         Mood and Affect: Mood normal.            CRANIAL NERVES     CN III, IV, VI   Pupils are equal, round, and reactive to light.     Significant Labs: All pertinent labs within the past 24 hours have been reviewed.    Significant Imaging: I have reviewed all pertinent imaging results/findings within the past 24 hours.      Assessment/Plan:      * LATONIA (acute kidney injury)  Patient with acute kidney injury likely due to pre-renal azotemia LATONIA is currently stable. Labs reviewed- Renal function/electrolytes with CrCl cannot be calculated (Unknown ideal weight.). according to latest data. Monitor urine output and serial BMP and adjust therapy as needed. Avoid nephrotoxins and renally dose meds for GFR listed above.     Causing metabolic acidosis and hyperkalemia which are being treated.    CT did not show hydronephrosis but did have renal lesions and could not r/o solid mass  Renal US pending.      Hyperlipidemia  Will check lipid in AM  Holding zetia and statin at this time  Will check CK in am      Acute cystitis with hematuria  Start IV rocephin and follow culture.    Renal US pending.    Renal cell carcinoma suspected.        Cancer  Treated at [facility] 20 years ago.   Has not had to follow up in ten years.        Diabetes mellitus  Patient's FSGs are uncontrolled due to hyperglycemia on current medication regimen.  Last A1c reviewed- No results found for: LABA1C, HGBA1C  Most recent fingerstick glucose reviewed- Recent Labs   Lab 06/07/23  2246   POCTGLUCOSE 237*     Current correctional scale  Medium  Increase anti-hyperglycemic dose as follows-   Antihyperglycemics (From admission, onward)    None        Hold Oral hypoglycemics while patient is in the hospital.    Hypertension  Continue norvasc   Hold BB HR 62      Metabolic acidosis  Bicarb infusion.    Stop metformin  Lactic acid 1.8        Hyperkalemia  Augustin's cocktail given in ED along with calcium  gluconate  EKG without significant abnormalities  Lokelma 10 mg loading dose  Recheck K in am and may need continue dosing.    Monitor on telemetry.          VTE Risk Mitigation (From admission, onward)         Ordered     Place CARMENZA hose  Until discontinued         06/08/23 0523                Discharge Planning   PASQUALE:      Code Status: DNR   Is the patient medically ready for discharge?:     Reason for patient still in hospital (select all that apply): Treatment                     Reg Chisholm MD  Department of Hospital Medicine   [facility] - Emergency Department

## 2023-06-08 NOTE — ASSESSMENT & PLAN NOTE
Augustin's cocktail given in ED along with calcium gluconate  EKG without significant abnormalities  Lokelma 10 mg loading dose  Recheck K in am and may need continue dosing.    Monitor on telemetry.

## 2023-06-08 NOTE — NURSING
1500 received patient to room from ED on hospital bed. Wife at bedside. Pt alert and oriented x4. Denies pain. IV infusing as ordered. Left pt in bed with call bell in reach.    1748 end of shift pt stable resting in bed on right side. Eyes closed. Respirations even. Wife at bedside denies needs. Safety measures in place.

## 2023-06-08 NOTE — CONSULTS
[facility] - Emergency Department  Adult Nutrition  Consult Note         Reason for Assessment  Reason For Assessment: consult, identified at risk by screening criteria (poor appetite and MST)   Nutrition Risk Screen: reduced oral intake over the last month     Consult received and appreciated. Consult for poor appetite and MST. Patient is on a cardiac diet. Patient reports decreased oral intake as well as trouble swallowing. Recommend ST eval. RD will follow up with NFPE as appropriate.   Unable to assess nutritional needs due to no height or weight in chart. RD will follow up.     Recommend addition of Diabetic diet and Glucerna TID with meals. Consider appetite stimulant if appropriate.     Per MD notes:  HPI: 82 yo M presents to the ED for constipation.  He states he has not had a BM in nearly two weels.  He denies abdominal pain.  He says he just doesn't feel well.  His wife says he has been fatigued with decreased appetite for the past two weeks and she says he was too week today to get out of bed.  He looks much better now that he has gotten some IVF.  He did have an abdominal CT wo contrast which did not show an obstruction of impaction nor hydronephrosis but renal lesions noted.  Renal US ordered to help differentiate solid vs cystic lesions.       Patient states he was told ten years ago at [facility] by Dr. Ramirez that he had renal cancer and a nephrectomy was recommended.  He left and never went back.  He found a new PCP at the health department in [place] that would fill his prescriptions and he has not been back to a doctor since that time.  He did have radioactive seed implants for prostate cancer placed at [facility] 20 years ago and says he has been fine since that time and says at his age he did not want aggressive measures anyway.  Today he has hematuria and possible UTI.  Lactic acid 1.7.  Was given zosyn in the ED and cultured.       Patient has marked prerenal  azotemia hyperkalemia and metabolic acidosis.  Will order urine na and urine creat to calculate FeNa.       ROS below but also states his decreased oral intake is due to dysphagia.  He says for the past few weeks he has even had trouble swallowing water and that he has not taken his meds in nearly two weeks.  He feels they get stuck in his throat.  Important to mention is he has been a smoker for 65 years but quit recently.           Malnutrition  NFPE to follow   Skin Integrity     Comments on skin integrity: no issues documented in chart  Nutrition Diagnosis  Inadequate oral intake   related to Appetite loss, Dysphagia/ difficulty swallowing and Nausea / Vomiting as evidenced by patient reports issues swallowing and meds getting stuck in his throat with reduced oral intakes    Nutrition Diagnosis Status: Chronic/ continues        Nutrition Risk  Level of Risk/Frequency of Follow-up: moderate - high  Comments on nutrition risk: poor appetite   Recent Labs   Lab 06/07/23 2118 06/08/23  0523   GLU  --  180*   POCGLU 316*  --      Comments on Glucose: elevated with dx of diabetes  Nutrition Prescription / Recommendations  Recommendation/Intervention: Recommend continue with cardiac diet. Recommend addition of diabetic diet and Glucerna TIOD with meals.  Goals: intake % + supplements  Nutrition Goal Status: new  Current Diet Order: cardiac, diabetic diet  Oral Nutrition Supplement: Glucerna TID  Chewing or Swallowing Difficulty?: recommend ST eval  Recommended Diet: diabetic diet  Recommended Oral Supplement: Glucerna Shake [220 kcals, 10g Protein, 26g Carbs(4g Fiber, 7g Sugar), 9g Fat] three times daily  Is Nutrition Support Recommended: No  Is Education Recommended: No  Monitor and Evaluation  % current Intake: NPO  % intake to meet estimated needs: P.O. + Supplements  Food and Nutrient Intake: food and beverage intake  Food and Nutrient Adminstration: diet order  Anthropometric Measurements: weight, weight  change, body mass index  Biochemical Data, Medical Tests and Procedures: electrolyte and renal panel, gastrointestinal profile, glucose/endocrine profile, inflammatory profile, lipid profile  Nutrition-Focused Physical Findings: overall appearance, extremities, muscles and bones, head and eyes, skin     Current Medical Diagnosis and Past Medical History     Past Medical History:   Diagnosis Date    Cancer     prostate diagnosed 2013    Diabetes mellitus     Hypertension      Nutrition/Diet History  Food Allergies: NKFA  Factors Affecting Nutritional Intake: decreased appetite  Lab/Procedures/Meds  Recent Labs   Lab 06/07/23  1927 06/08/23  0523    147*   K 6.8* 4.8   * 106*   CREATININE 3.95* 3.39*   CALCIUM 8.6 8.3*   ALBUMIN 3.5  --    * 112*   ALT 25  --    AST 10*  --    PHOS 6.6*  --      Last A1c:   Lab Results   Component Value Date    HGBA1C 5.4 06/08/2023     Lab Results   Component Value Date    RBC 3.37 (L) 06/08/2023    HGB 10.3 (L) 06/08/2023    HCT 31.3 (L) 06/08/2023    MCV 92.9 06/08/2023    MCH 30.6 06/08/2023    MCHC 32.9 06/08/2023     Pertinent Labs Reviewed: reviewed  Pertinent Labs Comments: Sodium: 147 (H)  Potassium: 4.8  Chloride: 112 (H)  CO2: 17 (L)  Anion Gap: 23 (H)  BUN: 106 (H)  Creatinine: 3.39 (H)  BUN/CREAT RATIO: 31 (H)  eGFR: 17 (L)  Glucose: 180 (H)  Calcium: 8.3 (L)      (H): Data is abnormally high  (L): Data is abnormally low  Pertinent Medications Comments: rocephinm amlodipine  Anthropometrics  Temp: 97.8 °F (36.6 °C)     Estimated/Assessed Needs        Temp: 97.8 °F (36.6 °C)   Weight Used For Calorie Calculations:  (no current height or weight available in chart)                         Nutrition by Nursing              Last Bowel Movement: 06/08/23              Nutrition Follow-Up  RD Follow-up?: Yes

## 2023-06-08 NOTE — HPI
82 yo M presents to the ED for constipation.  He states he has not had a BM in nearly two weels.  He denies abdominal pain.  He says he just doesn't feel well.  His wife says he has been fatigued with decreased appetite for the past two weeks and she says he was too week today to get out of bed.  He looks much better now that he has gotten some IVF.  He did have an abdominal CT wo contrast which did not show an obstruction of impaction nor hydronephrosis but renal lesions noted.  Renal US ordered to help differentiate solid vs cystic lesions.      Patient states he was told ten years ago at [facility] by Dr. Ramirez that he had renal cancer and a nephrectomy was recommended.  He left and never went back.  He found a new PCP at the health department in [place] that would fill his prescriptions and he has not been back to a doctor since that time.  He did have radioactive seed implants for prostate cancer placed at [facility] 20 years ago and says he has been fine since that time and says at his age he did not want aggressive measures anyway.  Today he has hematuria and possible UTI.  Lactic acid 1.7.  Was given zosyn in the ED and cultured.      Patient has marked prerenal azotemia hyperkalemia and metabolic acidosis.  Will order urine na and urine creat to calculate FeNa.      ROS below but also states his decreased oral intake is due to dysphagia.  He says for the past few weeks he has even had trouble swallowing water and that he has not taken his meds in nearly two weeks.  He feels they get stuck in his throat.  Important to mention is he has been a smoker for 65 years but quit recently.

## 2023-06-08 NOTE — ED PROVIDER NOTES
Encounter Date: 6/7/2023    SCRIBE #1 NOTE: I, Kiki Linares, am scribing for, and in the presence of,  Ruben Ott MD. I have scribed the entire note.     History     Chief Complaint   Patient presents with    Abdominal Pain    Constipation     The patient is a 83 y.o. male that presents to the emergency department due to abdominal pain. The patients wife explains that for the last week the patient has not wanted to eat and the patient states that for the last 2 weeks he has been experiencing abdominal pain as well as constipation. He states that his last bowel movement was 2 weeks ago. The patient has a medical hx of prostate cancer that is from 20 years ago as well as HBP. No other symptoms or medical hx were reported.    The history is provided by the patient and the EMS personnel. No  was used.   Review of patient's allergies indicates:  No Known Allergies  Past Medical History:   Diagnosis Date    Diabetes mellitus     Hypertension      History reviewed. No pertinent surgical history.  History reviewed. No pertinent family history.  Social History     Tobacco Use    Smoking status: Former     Types: Cigarettes    Smokeless tobacco: Never   Substance Use Topics    Alcohol use: Never     Review of Systems   Constitutional:  Positive for appetite change.   HENT: Negative.     Eyes: Negative.    Respiratory: Negative.     Cardiovascular: Negative.    Gastrointestinal:  Positive for abdominal pain and constipation.   Endocrine: Negative.    Genitourinary: Negative.    Musculoskeletal: Negative.    Skin: Negative.    Allergic/Immunologic: Negative.    Neurological: Negative.    Hematological: Negative.    Psychiatric/Behavioral: Negative.     All other systems reviewed and are negative.    Physical Exam     Initial Vitals [06/07/23 1910]   BP Pulse Resp Temp SpO2   129/64 63 18 97.8 °F (36.6 °C) 96 %      MAP       --         Physical Exam    Constitutional: He appears well-developed and  well-nourished.   Eyes: Conjunctivae and EOM are normal. Pupils are equal, round, and reactive to light.   Neck: Neck supple.   Normal range of motion.  Cardiovascular:  Normal rate, regular rhythm, normal heart sounds and intact distal pulses.           Pulmonary/Chest: Breath sounds normal.   Abdominal: There is abdominal tenderness.   Musculoskeletal:         General: Normal range of motion.      Cervical back: Normal range of motion and neck supple.     Neurological: He is alert and oriented to person, place, and time. He has normal strength and normal reflexes.   Skin: Skin is warm and dry. Capillary refill takes less than 2 seconds.   Psychiatric: He has a normal mood and affect. His behavior is normal. Judgment and thought content normal.       ED Course   Procedures  Labs Reviewed   COMPREHENSIVE METABOLIC PANEL - Abnormal; Notable for the following components:       Result Value    Potassium 6.8 (*)     Chloride 113 (*)     CO2 10 (*)     Anion Gap 26 (*)     Glucose 117 (*)      (*)     Creatinine 3.95 (*)     BUN/Creatinine Ratio 32 (*)     AST 10 (*)     eGFR 14 (*)     All other components within normal limits   URINALYSIS, REFLEX TO URINE CULTURE - Abnormal; Notable for the following components:    Leukocytes, UA Large (*)     Protein, UA 30 (*)     All other components within normal limits   CBC WITH DIFFERENTIAL - Abnormal; Notable for the following components:    WBC 11.54 (*)     RBC 3.69 (*)     Hemoglobin 11.3 (*)     Hematocrit 35.6 (*)     MCV 96.5 (*)     MCHC 31.7 (*)     Neutrophils % 74.8 (*)     Lymphocytes % 15.5 (*)     Monocytes % 8.6 (*)     Eosinophils % 0.1 (*)     Immature Granulocytes % 0.7 (*)     Neutrophils, Abs 8.64 (*)     Monocytes, Absolute 0.99 (*)     Immature Granulocytes, Absolute 0.08 (*)     All other components within normal limits   URINALYSIS, MICROSCOPIC - Abnormal; Notable for the following components:    WBC, UA 5-10 (*)     Bacteria, UA Many (*)     All  other components within normal limits   POCT GLUCOSE MONITORING CONTINUOUS - Abnormal; Notable for the following components:    POC Glucose 316 (*)     All other components within normal limits   CULTURE, URINE   CULTURE, BLOOD   CULTURE, BLOOD   CBC W/ AUTO DIFFERENTIAL    Narrative:     The following orders were created for panel order CBC auto differential.  Procedure                               Abnormality         Status                     ---------                               -----------         ------                     CBC with Differential[[id number]]        Abnormal            Final result                 Please view results for these tests on the individual orders.   LACTIC ACID, PLASMA          Imaging Results              CT Abdomen Pelvis  Without Contrast (Final result)  Result time 06/07/23 20:36:31      Final result by Ranjith Treviño MD (06/07/23 20:36:31)                   Impression:      No definite acute process.  No hydronephrosis.  No bowel obstruction.    Probable bilateral renal cysts, though underlying solid renal mass cannot be excluded.  Follow-up nonemergent renal ultrasound is recommended.    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  Constipation.  Decreased appetite.  History of prostate cancer    TECHNIQUE:  Axial CT images were obtained through the abdomen and pelvis without IV contrast.  Oral contrast was not given.  Coronal and sagittal reconstructions submitted and interpreted.  Total DLP 1178.1 mGycm.  Automated exposure control utilized.    COMPARISON:  No previous similar    FINDINGS:  CT abdomen:    There is no denis pneumonia in the partially visualized lung bases.  There is no gross pleural or pericardial  effusion.    There is no evidence of pneumoperitoneum.    Liver, gallbladder, bile ducts, spleen, pancreas, and adrenal glands are generally unremarkable in noncontrast CT appearance.    There is no radiopaque renal or ureteral stone.  There is no hydronephrosis.    There are numerous exophytic lower density and medium density rounded lesions associated with either kidney.  These likely represent cysts, though underlying solid lesion cannot be confidently excluded.  Recommend nonemergent follow-up renal ultrasound.    There is some aneurysmal dilatation of the distal descending thoracic aorta at 4.1 cm.  There is some mild fusiform aneurysmal dilatation of the infrarenal abdominal aorta at 3.7 cm.    There is a small hiatal hernia.  Stomach is not well distended but is generally unremarkable.  There is no denis bowel obstruction.  Appendix appears normal.    CT pelvis:    Urinary bladder is mildly distended.  Prostate seed implants are noted in the prostate.  No definite pelvic mass is seen.    No acute osseous findings.  There is prominent degenerative disc narrowing at L4-L5.  There is scattered mild to moderate spondylosis.                                       CT Head Without Contrast (Final result)  Result time 06/07/23 20:25:51      Final result by Ranjith Treviño MD (06/07/23 20:25:51)                   Impression:      No acute intracranial process    Mild cerebral atrophy.  Mild periventricular small vessel disease      Electronically signed by: Ranjith Treviño  Date:    06/07/2023  Time:    20:25               Narrative:    EXAMINATION:  CT head without contrast    CLINICAL HISTORY:  Mental status change, unknown cause;    TECHNIQUE:  Transaxial CT sections were obtained through the brain without contrast.    The CT examination was performed using one or more of the following dose reduction techniques: Automated exposure control, adjustment of the mA and kV according to patient's size, use of acute or  iterative reconstruction techniques.    COMPARISON:  No previous similar    FINDINGS:  The ventricles are midline in position without evidence of hydrocephalus. There is no mass or area of parenchymal hemorrhage.  There is mild cerebral atrophy.  There is a small amount of ill-defined low-density in the periventricular white matter without mass effect compatible with changes of small vessel disease.  There is no gross CT evidence of acute cortical stroke. There is no extra-axial hematoma. The partially visualized sinuses are generally clear. There is no obvious skull fracture.  There is mild distal internal carotid artery calcification bilaterally.                                       Medications   piperacillin-tazobactam (ZOSYN) 4.5 g in dextrose 5 % in water (D5W) 5 % 100 mL IVPB (MB+) (has no administration in time range)   sodium bicarbonate 8.4 % (1 mEq/mL) injection 50 mEq (has no administration in time range)   sodium bicarbonate 8.4 % (1 mEq/mL) injection 50 mEq (has no administration in time range)   sodium chloride 0.9% bolus 1,000 mL 1,000 mL (0 mLs Intravenous Stopped 6/7/23 2131)   dextrose 50% injection 50 g (50 g Intravenous Given 6/7/23 2036)   insulin regular injection 10 Units 0.1 mL (10 Units Intravenous Given 6/7/23 2034)   calcium gluconate 1 g in NS IVPB (premixed) (0 g Intravenous Stopped 6/7/23 2044)     Medical Decision Making:   Initial Assessment:   Patient comes in with not eating or drinking for 1 week and becoming weaker.  No nausea vomiting or diarrhea.  No chest pain   Differential Diagnosis:   1. Altered mental status with decreased p.o. intake 2.  Acidosis.  3. Rule out sepsis.            Attending Attestation:           Physician Attestation for Scribe:  Physician Attestation Statement for Scribe #1: I, Ruben Ott MD, reviewed documentation, as scribed by Kiki Linares in my presence, and it is both accurate and complete.                        Clinical Impression:   Final  diagnoses:  [R10.9] Abdominal pain               Ruben Ott, DO  06/07/23 1462

## 2023-06-08 NOTE — SUBJECTIVE & OBJECTIVE
Past Medical History:   Diagnosis Date    Cancer     prostate diagnosed 2013    Diabetes mellitus     Hypertension        Past Surgical History:   Procedure Laterality Date    RADIOACTIVE SEED IMPLANTATION OF PROSTATE         Review of patient's allergies indicates:  No Known Allergies    No current facility-administered medications on file prior to encounter.     Current Outpatient Medications on File Prior to Encounter   Medication Sig    albuterol sulfate (PROAIR RESPICLICK) 90 mcg/actuation inhaler Inhale 2 puffs into the lungs every 6 (six) hours as needed for Wheezing. Rescue    amLODIPine (NORVASC) 10 MG tablet Take 10 mg by mouth once daily.    aspirin (ECOTRIN) 81 MG EC tablet Take 81 mg by mouth once daily.    atenoloL (TENORMIN) 50 MG tablet Take 50 mg by mouth once daily.    atorvastatin (LIPITOR) 40 MG tablet Take 40 mg by mouth once daily.    enalapril (VASOTEC) 10 MG tablet Take 10 mg by mouth once daily.    ezetimibe (ZETIA) 10 mg tablet Take 10 mg by mouth once daily.    metFORMIN (GLUCOPHAGE) 500 MG tablet Take 500 mg by mouth 2 (two) times daily with meals.    niacin 500 MG CpSR Take 250 mg by mouth every evening.     Family History    None       Tobacco Use    Smoking status: Former     Types: Cigarettes    Smokeless tobacco: Never   Substance and Sexual Activity    Alcohol use: Never    Drug use: Never    Sexual activity: Not Currently     Review of Systems   Constitutional:  Positive for appetite change and fatigue. Negative for chills, fever and unexpected weight change.   HENT:  Positive for trouble swallowing. Negative for congestion, mouth sores, nosebleeds, sinus pain and sore throat.    Eyes:  Positive for visual disturbance.   Respiratory:  Negative for apnea, cough, chest tightness and shortness of breath.    Cardiovascular:  Negative for chest pain, palpitations and leg swelling.   Gastrointestinal:  Negative for abdominal pain, blood in stool, constipation, diarrhea, nausea and  vomiting.   Endocrine: Negative for polydipsia and polyuria.   Genitourinary:  Negative for decreased urine volume, difficulty urinating, dysuria, flank pain and frequency.   Musculoskeletal:  Negative for arthralgias, back pain and neck pain.   Skin:  Negative for rash.   Neurological:  Negative for tremors, syncope, light-headedness and headaches.   Hematological:  Does not bruise/bleed easily.   Psychiatric/Behavioral:  Negative for confusion and suicidal ideas.    Objective:     Vital Signs (Most Recent):  Temp: 97.8 °F (36.6 °C) (06/07/23 1910)  Pulse: 84 (06/07/23 2355)  Resp: 20 (06/07/23 2355)  BP: 127/83 (06/07/23 2355)  SpO2: 95 % (06/07/23 2355) Vital Signs (24h Range):  Temp:  [97.8 °F (36.6 °C)] 97.8 °F (36.6 °C)  Pulse:  [60-88] 84  Resp:  [17-25] 20  SpO2:  [95 %-97 %] 95 %  BP: (121-148)/(46-83) 127/83        There is no height or weight on file to calculate BMI.     Physical Exam  Vitals and nursing note reviewed. Exam conducted with a chaperone present.   Constitutional:       Appearance: Normal appearance.   HENT:      Head: Atraumatic.      Mouth/Throat:      Mouth: Mucous membranes are moist.      Pharynx: Oropharynx is clear.   Eyes:      Conjunctiva/sclera: Conjunctivae normal.      Pupils: Pupils are equal, round, and reactive to light.   Neck:      Vascular: No carotid bruit.   Cardiovascular:      Rate and Rhythm: Normal rate and regular rhythm.      Pulses: Normal pulses.      Heart sounds: Normal heart sounds.   Pulmonary:      Effort: Pulmonary effort is normal.      Breath sounds: Normal breath sounds.   Abdominal:      General: Abdomen is flat. Bowel sounds are normal. There is no distension.      Palpations: Abdomen is soft.      Tenderness: There is no abdominal tenderness. There is no guarding or rebound.   Musculoskeletal:         General: No tenderness, deformity or signs of injury. Normal range of motion.      Cervical back: Neck supple.      Right lower leg: No edema.      Left  lower leg: No edema.   Skin:     General: Skin is warm and dry.      Capillary Refill: Capillary refill takes less than 2 seconds.      Coloration: Skin is not jaundiced or pale.      Findings: No bruising, lesion or rash.   Neurological:      General: No focal deficit present.      Mental Status: He is alert and oriented to person, place, and time.   Psychiatric:         Mood and Affect: Mood normal.            CRANIAL NERVES     CN III, IV, VI   Pupils are equal, round, and reactive to light.     Significant Labs: All pertinent labs within the past 24 hours have been reviewed.    Significant Imaging: I have reviewed all pertinent imaging results/findings within the past 24 hours.

## 2023-06-08 NOTE — SUBJECTIVE & OBJECTIVE
Past Medical History:   Diagnosis Date    Cancer     prostate diagnosed 2013    Diabetes mellitus     Hypertension        Past Surgical History:   Procedure Laterality Date    RADIOACTIVE SEED IMPLANTATION OF PROSTATE         Review of patient's allergies indicates:  No Known Allergies    No current facility-administered medications on file prior to encounter.     Current Outpatient Medications on File Prior to Encounter   Medication Sig    albuterol sulfate (PROAIR RESPICLICK) 90 mcg/actuation inhaler Inhale 2 puffs into the lungs every 6 (six) hours as needed for Wheezing. Rescue    amLODIPine (NORVASC) 10 MG tablet Take 10 mg by mouth once daily.    aspirin (ECOTRIN) 81 MG EC tablet Take 81 mg by mouth once daily.    atenoloL (TENORMIN) 50 MG tablet Take 50 mg by mouth once daily.    atorvastatin (LIPITOR) 40 MG tablet Take 40 mg by mouth once daily.    enalapril (VASOTEC) 10 MG tablet Take 10 mg by mouth once daily.    ezetimibe (ZETIA) 10 mg tablet Take 10 mg by mouth once daily.    metFORMIN (GLUCOPHAGE) 500 MG tablet Take 500 mg by mouth 2 (two) times daily with meals.    niacin 500 MG CpSR Take 250 mg by mouth every evening.     Family History    None       Tobacco Use    Smoking status: Former     Types: Cigarettes    Smokeless tobacco: Never   Substance and Sexual Activity    Alcohol use: Never    Drug use: Never    Sexual activity: Not Currently     Review of Systems   Constitutional:  Positive for appetite change and fatigue. Negative for chills, fever and unexpected weight change.   HENT:  Positive for trouble swallowing. Negative for congestion, mouth sores, nosebleeds, sinus pain and sore throat.    Eyes:  Positive for visual disturbance.   Respiratory:  Negative for apnea, cough, chest tightness and shortness of breath.    Cardiovascular:  Negative for chest pain, palpitations and leg swelling.   Gastrointestinal:  Negative for abdominal pain, blood in stool, constipation, diarrhea, nausea and  vomiting.   Endocrine: Negative for polydipsia and polyuria.   Genitourinary:  Negative for decreased urine volume, difficulty urinating, dysuria, flank pain and frequency.   Musculoskeletal:  Negative for arthralgias, back pain and neck pain.   Skin:  Negative for rash.   Neurological:  Negative for tremors, syncope, light-headedness and headaches.   Hematological:  Does not bruise/bleed easily.   Psychiatric/Behavioral:  Negative for confusion and suicidal ideas.    Objective:     Vital Signs (Most Recent):  Temp: 97.8 °F (36.6 °C) (06/07/23 1910)  Pulse: 78 (06/08/23 1010)  Resp: (!) 24 (06/08/23 0455)  BP: (!) 123/40 (06/08/23 1000)  SpO2: (!) 94 % (06/08/23 1010) Vital Signs (24h Range):  Temp:  [97.8 °F (36.6 °C)] 97.8 °F (36.6 °C)  Pulse:  [54-88] 78  Resp:  [17-25] 24  SpO2:  [89 %-97 %] 94 %  BP: (107-148)/(36-83) 123/40        There is no height or weight on file to calculate BMI.     Physical Exam  Vitals and nursing note reviewed. Exam conducted with a chaperone present.   Constitutional:       Appearance: Normal appearance.   HENT:      Head: Atraumatic.      Mouth/Throat:      Mouth: Mucous membranes are moist.      Pharynx: Oropharynx is clear.   Eyes:      Conjunctiva/sclera: Conjunctivae normal.      Pupils: Pupils are equal, round, and reactive to light.   Neck:      Vascular: No carotid bruit.   Cardiovascular:      Rate and Rhythm: Normal rate and regular rhythm.      Pulses: Normal pulses.      Heart sounds: Normal heart sounds.   Pulmonary:      Effort: Pulmonary effort is normal.      Breath sounds: Normal breath sounds.   Abdominal:      General: Abdomen is flat. Bowel sounds are normal. There is no distension.      Palpations: Abdomen is soft.      Tenderness: There is no abdominal tenderness. There is no guarding or rebound.   Musculoskeletal:         General: No tenderness, deformity or signs of injury. Normal range of motion.      Cervical back: Neck supple.      Right lower leg: No  edema.      Left lower leg: No edema.   Skin:     General: Skin is warm and dry.      Capillary Refill: Capillary refill takes less than 2 seconds.      Coloration: Skin is not jaundiced or pale.      Findings: No bruising, lesion or rash.   Neurological:      General: No focal deficit present.      Mental Status: He is alert and oriented to person, place, and time.   Psychiatric:         Mood and Affect: Mood normal.            CRANIAL NERVES     CN III, IV, VI   Pupils are equal, round, and reactive to light.     Significant Labs: All pertinent labs within the past 24 hours have been reviewed.    Significant Imaging: I have reviewed all pertinent imaging results/findings within the past 24 hours.

## 2023-06-08 NOTE — NURSING
1501: Rec'd pt laying in bed from ER. Pt stable upon arrival. Resp even, unlabored. No complaints/concerns. Admission completed at this time. Safety precautions in place. CB within reach. Continuation of care will be completed by primary nurse.

## 2023-06-09 ENCOUNTER — ANESTHESIA EVENT (OUTPATIENT)
Dept: GASTROENTEROLOGY | Facility: HOSPITAL | Age: 83
DRG: 683 | End: 2023-06-09
Payer: COMMERCIAL

## 2023-06-09 ENCOUNTER — ANESTHESIA (OUTPATIENT)
Dept: GASTROENTEROLOGY | Facility: HOSPITAL | Age: 83
DRG: 683 | End: 2023-06-09
Payer: COMMERCIAL

## 2023-06-09 VITALS
DIASTOLIC BLOOD PRESSURE: 83 MMHG | SYSTOLIC BLOOD PRESSURE: 121 MMHG | TEMPERATURE: 97 F | RESPIRATION RATE: 17 BRPM | HEART RATE: 66 BPM | OXYGEN SATURATION: 95 %

## 2023-06-09 PROBLEM — R13.19 ESOPHAGEAL DYSPHAGIA: Status: ACTIVE | Noted: 2023-06-09

## 2023-06-09 PROBLEM — K29.80 DUODENITIS: Status: ACTIVE | Noted: 2023-06-09

## 2023-06-09 PROBLEM — K44.9 HH (HIATUS HERNIA): Status: ACTIVE | Noted: 2023-06-09

## 2023-06-09 PROBLEM — K25.3 ACUTE GASTRIC ULCER WITHOUT HEMORRHAGE OR PERFORATION: Status: ACTIVE | Noted: 2023-06-09

## 2023-06-09 PROBLEM — K20.90 ESOPHAGITIS: Status: ACTIVE | Noted: 2023-06-09

## 2023-06-09 LAB
ANION GAP SERPL CALCULATED.3IONS-SCNC: 14 MMOL/L (ref 7–16)
AORTIC ROOT ANNULUS: 3.1 CM
AORTIC VALVE CUSP SEPERATION: 1.94 CM
AV INDEX (PROSTH): 0.67
AV MEAN GRADIENT: 2 MMHG
AV PEAK GRADIENT: 3 MMHG
AV VALVE AREA: 2.09 CM2
AV VELOCITY RATIO: 0.89
BASOPHILS # BLD AUTO: 0.06 K/UL (ref 0–0.2)
BASOPHILS NFR BLD AUTO: 0.7 % (ref 0–1)
BSA FOR ECHO PROCEDURE: 1.98 M2
BUN SERPL-MCNC: 64 MG/DL (ref 7–18)
BUN/CREAT SERPL: 30 (ref 6–20)
CALCIUM SERPL-MCNC: 8.2 MG/DL (ref 8.5–10.1)
CHLORIDE SERPL-SCNC: 107 MMOL/L (ref 98–107)
CO2 SERPL-SCNC: 27 MMOL/L (ref 21–32)
CREAT SERPL-MCNC: 2.16 MG/DL (ref 0.7–1.3)
CV ECHO LV RWT: 0.45 CM
DIFFERENTIAL METHOD BLD: ABNORMAL
DOP CALC AO PEAK VEL: 0.9 M/S
DOP CALC AO VTI: 15 CM
DOP CALC LVOT AREA: 3.1 CM2
DOP CALC LVOT DIAMETER: 2 CM
DOP CALC LVOT PEAK VEL: 0.8 M/S
DOP CALC LVOT STROKE VOLUME: 31.4 CM3
DOP CALC MV VTI: 25.5 CM
DOP CALCLVOT PEAK VEL VTI: 10 CM
E WAVE DECELERATION TIME: 158 MSEC
ECHO EF ESTIMATED: 55 %
ECHO LV POSTERIOR WALL: 1 CM (ref 0.6–1.1)
EGFR (NO RACE VARIABLE) (RUSH/TITUS): 30 ML/MIN/1.73M2
EJECTION FRACTION: 55 %
EOSINOPHIL # BLD AUTO: 0.25 K/UL (ref 0–0.5)
EOSINOPHIL NFR BLD AUTO: 2.8 % (ref 1–4)
ERYTHROCYTE [DISTWIDTH] IN BLOOD BY AUTOMATED COUNT: 12.7 % (ref 11.5–14.5)
FERRITIN SERPL-MCNC: 417 NG/ML (ref 26–388)
FRACTIONAL SHORTENING: 34 % (ref 28–44)
GLUCOSE SERPL-MCNC: 125 MG/DL (ref 74–106)
GLUCOSE SERPL-MCNC: 137 MG/DL (ref 70–105)
HCT VFR BLD AUTO: 29.1 % (ref 40–54)
HGB BLD-MCNC: 9.9 G/DL (ref 13.5–18)
IMM GRANULOCYTES # BLD AUTO: 0.04 K/UL (ref 0–0.04)
IMM GRANULOCYTES NFR BLD: 0.5 % (ref 0–0.4)
INTERVENTRICULAR SEPTUM: 0.99 CM (ref 0.6–1.1)
IRON SATN MFR SERPL: 35 % (ref 14–50)
IRON SERPL-MCNC: 52 ΜG/DL (ref 65–175)
IVC OSTIUM: 1.4 CM
LEFT ATRIUM SIZE: 2.3 CM
LEFT INTERNAL DIMENSION IN SYSTOLE: 2.91 CM (ref 2.1–4)
LEFT VENTRICLE DIASTOLIC VOLUME INDEX: 44.75 ML/M2
LEFT VENTRICLE DIASTOLIC VOLUME: 88.6 ML
LEFT VENTRICLE MASS INDEX: 75 G/M2
LEFT VENTRICLE SYSTOLIC VOLUME INDEX: 16.4 ML/M2
LEFT VENTRICLE SYSTOLIC VOLUME: 32.5 ML
LEFT VENTRICULAR INTERNAL DIMENSION IN DIASTOLE: 4.42 CM (ref 3.5–6)
LEFT VENTRICULAR MASS: 147.88 G
LVOT MG: 2 MMHG
LYMPHOCYTES # BLD AUTO: 2.37 K/UL (ref 1–4.8)
LYMPHOCYTES NFR BLD AUTO: 27 % (ref 27–41)
MCH RBC QN AUTO: 30.7 PG (ref 27–31)
MCHC RBC AUTO-ENTMCNC: 34 G/DL (ref 32–36)
MCV RBC AUTO: 90.4 FL (ref 80–96)
MONOCYTES # BLD AUTO: 1.01 K/UL (ref 0–0.8)
MONOCYTES NFR BLD AUTO: 11.5 % (ref 2–6)
MPC BLD CALC-MCNC: 10.4 FL (ref 9.4–12.4)
MV MEAN GRADIENT: 4 MMHG
MV PEAK E VEL: 1.02 M/S
MV PEAK GRADIENT: 8 MMHG
MV STENOSIS PRESSURE HALF TIME: 75 MS
MV VALVE AREA BY CONTINUITY EQUATION: 1.23 CM2
MV VALVE AREA P 1/2 METHOD: 2.93 CM2
NEUTROPHILS # BLD AUTO: 5.05 K/UL (ref 1.8–7.7)
NEUTROPHILS NFR BLD AUTO: 57.5 % (ref 53–65)
NRBC # BLD AUTO: 0 X10E3/UL
NRBC, AUTO (.00): 0 %
PISA TR MAX VEL: 2.3 M/S
PLATELET # BLD AUTO: 178 K/UL (ref 150–400)
POTASSIUM SERPL-SCNC: 3.6 MMOL/L (ref 3.5–5.1)
RA MAJOR: 4.2 CM
RA PRESSURE: 3 MMHG
RBC # BLD AUTO: 3.22 M/UL (ref 4.6–6.2)
RIGHT VENTRICULAR END-DIASTOLIC DIMENSION: 4.1 CM
SODIUM SERPL-SCNC: 144 MMOL/L (ref 136–145)
TIBC SERPL-MCNC: 147 ΜG/DL (ref 250–450)
TR MAX PG: 21 MMHG
TRICUSPID ANNULAR PLANE SYSTOLIC EXCURSION: 2.3 CM
TV REST PULMONARY ARTERY PRESSURE: 24 MMHG
UA COMPLETE W REFLEX CULTURE PNL UR: ABNORMAL
WBC # BLD AUTO: 8.78 K/UL (ref 4.5–11)

## 2023-06-09 PROCEDURE — 88305 SURGICAL PATHOLOGY: ICD-10-PCS | Mod: 26,,, | Performed by: PATHOLOGY

## 2023-06-09 PROCEDURE — 88305 TISSUE EXAM BY PATHOLOGIST: CPT | Mod: TC,SUR | Performed by: INTERNAL MEDICINE

## 2023-06-09 PROCEDURE — 93005 ELECTROCARDIOGRAM TRACING: CPT

## 2023-06-09 PROCEDURE — 25000003 PHARM REV CODE 250: Performed by: NURSE ANESTHETIST, CERTIFIED REGISTERED

## 2023-06-09 PROCEDURE — 82962 GLUCOSE BLOOD TEST: CPT

## 2023-06-09 PROCEDURE — 88312 SPECIAL STAINS GROUP 1: CPT | Mod: 26,,, | Performed by: PATHOLOGY

## 2023-06-09 PROCEDURE — 25000003 PHARM REV CODE 250: Performed by: HOSPITALIST

## 2023-06-09 PROCEDURE — 82728 ASSAY OF FERRITIN: CPT | Performed by: INTERNAL MEDICINE

## 2023-06-09 PROCEDURE — 43450 DILATE ESOPHAGUS 1/MULT PASS: CPT | Performed by: INTERNAL MEDICINE

## 2023-06-09 PROCEDURE — 63600175 PHARM REV CODE 636 W HCPCS

## 2023-06-09 PROCEDURE — 43239 EGD BIOPSY SINGLE/MULTIPLE: CPT | Performed by: INTERNAL MEDICINE

## 2023-06-09 PROCEDURE — 83540 ASSAY OF IRON: CPT | Performed by: INTERNAL MEDICINE

## 2023-06-09 PROCEDURE — 88341 SURGICAL PATHOLOGY: ICD-10-PCS | Mod: 26,,, | Performed by: PATHOLOGY

## 2023-06-09 PROCEDURE — 99233 SBSQ HOSP IP/OBS HIGH 50: CPT | Mod: ,,, | Performed by: INTERNAL MEDICINE

## 2023-06-09 PROCEDURE — 88342 IMHCHEM/IMCYTCHM 1ST ANTB: CPT | Mod: 26,,, | Performed by: PATHOLOGY

## 2023-06-09 PROCEDURE — 83550 IRON BINDING TEST: CPT | Performed by: INTERNAL MEDICINE

## 2023-06-09 PROCEDURE — 88342 SURGICAL PATHOLOGY: ICD-10-PCS | Mod: 26,,, | Performed by: PATHOLOGY

## 2023-06-09 PROCEDURE — 43239 EGD BIOPSY SINGLE/MULTIPLE: CPT | Mod: ,,, | Performed by: INTERNAL MEDICINE

## 2023-06-09 PROCEDURE — 85025 COMPLETE CBC W/AUTO DIFF WBC: CPT | Performed by: INTERNAL MEDICINE

## 2023-06-09 PROCEDURE — 25000003 PHARM REV CODE 250: Performed by: INTERNAL MEDICINE

## 2023-06-09 PROCEDURE — 63600175 PHARM REV CODE 636 W HCPCS: Performed by: NURSE ANESTHETIST, CERTIFIED REGISTERED

## 2023-06-09 PROCEDURE — D9220A PRA ANESTHESIA: ICD-10-PCS | Mod: ,,, | Performed by: NURSE ANESTHETIST, CERTIFIED REGISTERED

## 2023-06-09 PROCEDURE — 43239 PR EGD, FLEX, W/BIOPSY, SGL/MULTI: ICD-10-PCS | Mod: ,,, | Performed by: INTERNAL MEDICINE

## 2023-06-09 PROCEDURE — 63600175 PHARM REV CODE 636 W HCPCS: Performed by: INTERNAL MEDICINE

## 2023-06-09 PROCEDURE — 88312 SURGICAL PATHOLOGY: ICD-10-PCS | Mod: 26,,, | Performed by: PATHOLOGY

## 2023-06-09 PROCEDURE — D9220A PRA ANESTHESIA: Mod: ,,, | Performed by: NURSE ANESTHETIST, CERTIFIED REGISTERED

## 2023-06-09 PROCEDURE — 63600175 PHARM REV CODE 636 W HCPCS: Performed by: HOSPITALIST

## 2023-06-09 PROCEDURE — 93010 EKG 12-LEAD: ICD-10-PCS | Mod: ,,, | Performed by: STUDENT IN AN ORGANIZED HEALTH CARE EDUCATION/TRAINING PROGRAM

## 2023-06-09 PROCEDURE — 88305 TISSUE EXAM BY PATHOLOGIST: CPT | Mod: 26,,, | Performed by: PATHOLOGY

## 2023-06-09 PROCEDURE — 43450 PR DILATE ESOPHAGUS: ICD-10-PCS | Mod: 51,,, | Performed by: INTERNAL MEDICINE

## 2023-06-09 PROCEDURE — 43450 DILATE ESOPHAGUS 1/MULT PASS: CPT | Mod: 51,,, | Performed by: INTERNAL MEDICINE

## 2023-06-09 PROCEDURE — 88341 IMHCHEM/IMCYTCHM EA ADD ANTB: CPT | Mod: 26,,, | Performed by: PATHOLOGY

## 2023-06-09 PROCEDURE — 92610 EVALUATE SWALLOWING FUNCTION: CPT

## 2023-06-09 PROCEDURE — 93010 ELECTROCARDIOGRAM REPORT: CPT | Mod: ,,, | Performed by: STUDENT IN AN ORGANIZED HEALTH CARE EDUCATION/TRAINING PROGRAM

## 2023-06-09 PROCEDURE — 11000001 HC ACUTE MED/SURG PRIVATE ROOM

## 2023-06-09 PROCEDURE — 27000716 HC OXISENSOR PROBE, ANY SIZE: Performed by: NURSE ANESTHETIST, CERTIFIED REGISTERED

## 2023-06-09 PROCEDURE — 99233 PR SUBSEQUENT HOSPITAL CARE,LEVL III: ICD-10-PCS | Mod: ,,, | Performed by: INTERNAL MEDICINE

## 2023-06-09 PROCEDURE — 80048 BASIC METABOLIC PNL TOTAL CA: CPT | Performed by: INTERNAL MEDICINE

## 2023-06-09 PROCEDURE — 25000003 PHARM REV CODE 250

## 2023-06-09 PROCEDURE — 27000284 HC CANNULA NASAL: Performed by: NURSE ANESTHETIST, CERTIFIED REGISTERED

## 2023-06-09 RX ORDER — METOPROLOL TARTRATE 1 MG/ML
INJECTION, SOLUTION INTRAVENOUS
Status: COMPLETED
Start: 2023-06-09 | End: 2023-06-09

## 2023-06-09 RX ORDER — ADENOSINE 3 MG/ML
INJECTION, SOLUTION INTRAVENOUS
Status: COMPLETED
Start: 2023-06-09 | End: 2023-06-09

## 2023-06-09 RX ORDER — PHENYLEPHRINE HYDROCHLORIDE 10 MG/ML
INJECTION INTRAVENOUS
Status: DISCONTINUED | OUTPATIENT
Start: 2023-06-09 | End: 2023-06-09

## 2023-06-09 RX ORDER — METOPROLOL TARTRATE 50 MG/1
50 TABLET ORAL 2 TIMES DAILY
Status: DISCONTINUED | OUTPATIENT
Start: 2023-06-09 | End: 2023-06-10 | Stop reason: HOSPADM

## 2023-06-09 RX ORDER — ETOMIDATE 2 MG/ML
INJECTION INTRAVENOUS
Status: DISCONTINUED | OUTPATIENT
Start: 2023-06-09 | End: 2023-06-09

## 2023-06-09 RX ORDER — METOPROLOL TARTRATE 1 MG/ML
5 INJECTION, SOLUTION INTRAVENOUS ONCE
Status: COMPLETED | OUTPATIENT
Start: 2023-06-09 | End: 2023-06-09

## 2023-06-09 RX ORDER — PROPOFOL 10 MG/ML
VIAL (ML) INTRAVENOUS CONTINUOUS PRN
Status: DISCONTINUED | OUTPATIENT
Start: 2023-06-09 | End: 2023-06-09

## 2023-06-09 RX ORDER — ASPIRIN 81 MG/1
81 TABLET ORAL DAILY
Status: DISCONTINUED | OUTPATIENT
Start: 2023-06-09 | End: 2023-06-10 | Stop reason: HOSPADM

## 2023-06-09 RX ORDER — ADENOSINE 3 MG/ML
6 INJECTION, SOLUTION INTRAVENOUS ONCE
Status: COMPLETED | OUTPATIENT
Start: 2023-06-09 | End: 2023-06-09

## 2023-06-09 RX ORDER — LIDOCAINE HYDROCHLORIDE 20 MG/ML
INJECTION, SOLUTION EPIDURAL; INFILTRATION; INTRACAUDAL; PERINEURAL
Status: DISCONTINUED | OUTPATIENT
Start: 2023-06-09 | End: 2023-06-09

## 2023-06-09 RX ORDER — ATORVASTATIN CALCIUM 40 MG/1
40 TABLET, FILM COATED ORAL DAILY
Status: DISCONTINUED | OUTPATIENT
Start: 2023-06-09 | End: 2023-06-10 | Stop reason: HOSPADM

## 2023-06-09 RX ORDER — PANTOPRAZOLE SODIUM 40 MG/1
40 TABLET, DELAYED RELEASE ORAL DAILY
Status: DISCONTINUED | OUTPATIENT
Start: 2023-06-09 | End: 2023-06-10 | Stop reason: HOSPADM

## 2023-06-09 RX ORDER — SODIUM CHLORIDE 0.9 % (FLUSH) 0.9 %
10 SYRINGE (ML) INJECTION
Status: CANCELLED | OUTPATIENT
Start: 2023-06-09

## 2023-06-09 RX ORDER — EZETIMIBE 10 MG/1
10 TABLET ORAL DAILY
Status: DISCONTINUED | OUTPATIENT
Start: 2023-06-09 | End: 2023-06-10 | Stop reason: HOSPADM

## 2023-06-09 RX ADMIN — PHENYLEPHRINE HYDROCHLORIDE 100 MCG: 10 INJECTION INTRAVENOUS at 04:06

## 2023-06-09 RX ADMIN — METOPROLOL TARTRATE 5 MG: 1 INJECTION, SOLUTION INTRAVENOUS at 09:06

## 2023-06-09 RX ADMIN — ETOMIDATE 3 MG: 20 INJECTION, SOLUTION INTRAVENOUS at 04:06

## 2023-06-09 RX ADMIN — ADENOSINE 6 MG: 3 INJECTION INTRAVENOUS at 09:06

## 2023-06-09 RX ADMIN — METOPROLOL TARTRATE 50 MG: 50 TABLET, FILM COATED ORAL at 11:06

## 2023-06-09 RX ADMIN — PANTOPRAZOLE SODIUM 40 MG: 40 TABLET, DELAYED RELEASE ORAL at 05:06

## 2023-06-09 RX ADMIN — ADENOSINE 6 MG: 3 INJECTION, SOLUTION INTRAVENOUS at 09:06

## 2023-06-09 RX ADMIN — AMLODIPINE BESYLATE 10 MG: 10 TABLET ORAL at 08:06

## 2023-06-09 RX ADMIN — SODIUM CHLORIDE: 9 INJECTION, SOLUTION INTRAVENOUS at 04:06

## 2023-06-09 RX ADMIN — METOPROLOL TARTRATE 50 MG: 50 TABLET, FILM COATED ORAL at 08:06

## 2023-06-09 RX ADMIN — PROPOFOL 200 MCG/KG/MIN: 10 INJECTION, EMULSION INTRAVENOUS at 04:06

## 2023-06-09 RX ADMIN — SODIUM CHLORIDE 500 ML: 9 INJECTION, SOLUTION INTRAVENOUS at 11:06

## 2023-06-09 RX ADMIN — CEFTRIAXONE SODIUM 1 G: 1 INJECTION, POWDER, FOR SOLUTION INTRAMUSCULAR; INTRAVENOUS at 08:06

## 2023-06-09 RX ADMIN — SODIUM BICARBONATE: 84 INJECTION, SOLUTION INTRAVENOUS at 02:06

## 2023-06-09 RX ADMIN — LIDOCAINE HYDROCHLORIDE 40 MG: 20 INJECTION, SOLUTION INTRAVENOUS at 04:06

## 2023-06-09 NOTE — ANESTHESIA POSTPROCEDURE EVALUATION
Anesthesia Post Evaluation    Patient: Darron Goddard    Procedure(s) Performed: * No procedures listed *    Final Anesthesia Type: general      Patient location during evaluation: GI PACU  Patient participation: Yes- Able to Participate  Level of consciousness: awake and alert  Post-procedure vital signs: reviewed and stable  Pain management: adequate  Airway patency: patent    PONV status at discharge: No PONV  Anesthetic complications: no      Cardiovascular status: blood pressure returned to baseline and hemodynamically stable  Respiratory status: spontaneous ventilation  Hydration status: euvolemic  Follow-up not needed.  Comments: Pt voices appreciation for care          Vitals Value Taken Time   /64 06/09/23 1645   Temp 36.1 °C (97 °F) 06/09/23 1629   Pulse 70 06/09/23 1646   Resp 17 06/09/23 1646   SpO2 94 % 06/09/23 1646   Vitals shown include unvalidated device data.      Event Time   Out of Recovery 06/09/2023 16:49:53         Pain/Rajan Score: Rajan Score: 9 (6/9/2023  4:32 PM)

## 2023-06-09 NOTE — PROGRESS NOTES
"[facility] - Emergency Department  Adult Nutrition  Follow-up Note         Reason for Assessment  Reason For Assessment: RD follow-up   Nutrition Risk Screen: difficulty chewing/swallowing     Consult received and appreciated. Consult for poor appetite and MST. Patient is on a cardiac diet. Patient reports decreased oral intake as well as trouble swallowing. Recommend ST eval. RD will follow up with NFPE as appropriate.   Unable to assess nutritional needs due to no height or weight in chart. RD will follow up.     Recommend addition of Diabetic diet and Glucerna TID with meals. Consider appetite stimulant if appropriate.     Per MD notes:  "* LATONIA (acute kidney injury)  Patient with acute kidney injury likely due to pre-renal azotemia LATONIA is currently stable. Labs reviewed- Renal function/electrolytes with CrCl cannot be calculated (Unknown ideal weight.). according to latest data. Monitor urine output and serial BMP and adjust therapy as needed. Avoid nephrotoxins and renally dose meds for GFR listed above.    Causing metabolic acidosis and hyperkalemia which are being treated.    CT did not show hydronephrosis but did have renal lesions and could not r/o solid mass  Renal US pending.     Hyperlipidemia  Will check lipid in AM  Holding zetia and statin at this time  Will check CK in am   Acute cystitis with hematuria  Start IV rocephin and follow culture.    Renal US pending.    Renal cell carcinoma suspected.     Cancer  Treated at [facility] 20 years ago.   Has not had to follow up in ten years.     Diabetes mellitus  Patient's FSGs are uncontrolled due to hyperglycemia on current medication regimen.  Last A1c reviewed- No results found for: LABA1C, HGBA1C      Most recent fingerstick glucose reviewed- Recent Labs   Lab 06/07/23  2246   POCTGLUCOSE 237*      Current correctional scale  Medium  Increase anti-hyperglycemic dose as follows-       Antihyperglycemics (From admission, onward)     " "None          Hold Oral hypoglycemics while patient is in the hospital.   Hypertension  Continue norvasc   Hold BB HR 62   Metabolic acidosis  Bicarb infusion.    Stop metformin  Lactic acid 1.8   Hyperkalemia  Augustin's cocktail given in ED along with calcium gluconate  EKG without significant abnormalities  Lokelma 10 mg loading dose  Recheck K in am and may need continue dosing.    Monitor on telemetry."       Patient is currently NPO.     Recommend advance to Renal Low protein as soon as able/appropriate and tolerated. Encourage good po intakes. If unable to advance diet x 24-48 hours or if continued poor intakes, consider alternate route of nutrition.    Last BM 6/8 per flowsheet.    Medication/labs reviewed. RD following.       Malnutrition  NFPE to follow   Skin Integrity  Andry Risk Assessment  Sensory Perception: 4-->no impairment  Moisture: 4-->rarely moist  Activity: 3-->walks occasionally  Mobility: 3-->slightly limited  Nutrition: 2-->probably inadequate (NPO for procedure)  Friction and Shear: 3-->no apparent problem  Andry Score: 19  Comments on skin integrity: no issues documented in chart  Nutrition Diagnosis  Inadequate oral intake   related to Appetite loss, Dysphagia/ difficulty swallowing and Nausea / Vomiting as evidenced by patient reports issues swallowing and meds getting stuck in his throat with reduced oral intakes    Nutrition Diagnosis Status: Chronic/ continues        Nutrition Risk  Level of Risk/Frequency of Follow-up: moderate - high  Comments on nutrition risk: poor appetite   Recent Labs   Lab 06/08/23  1014 06/09/23  0233   GLU  --  125*   POCGLU 184*  --      Comments on Glucose: elevated with dx of diabetes    Nutrition Prescription / Recommendations  Recommendation/Intervention: Recommend advance to Renal Low protein as soon as able/appropriate and tolerated. Encourage good po intakes. If unable to advance diet x 24-48 hours or if continued poor intakes, consider alternate " route of nutrition.  Goals: Diet advancement, weight maintenance  Nutrition Goal Status: progressing towards goal  Current Diet Order: NPO  Oral Nutrition Supplement: Glucerna TID  Chewing or Swallowing Difficulty?: recommend ST eval  Recommended Diet: Consistent Carbohydrate 2000 (75g Carbs) and Renal (Low Protein)  Recommended Oral Supplement: Glucerna Shake [220 kcals, 10g Protein, 26g Carbs(4g Fiber, 7g Sugar), 9g Fat] three times daily  Is Nutrition Support Recommended: No  Is Education Recommended: No  Monitor and Evaluation  % current Intake: NPO  % intake to meet estimated needs: P.O. + Supplements  Food and Nutrient Intake: food and beverage intake  Food and Nutrient Adminstration: diet order  Anthropometric Measurements: weight, weight change, body mass index  Biochemical Data, Medical Tests and Procedures: electrolyte and renal panel, gastrointestinal profile, glucose/endocrine profile, inflammatory profile, lipid profile  Nutrition-Focused Physical Findings: overall appearance, extremities, muscles and bones, head and eyes, skin     Current Medical Diagnosis and Past Medical History     Past Medical History:   Diagnosis Date    Cancer     prostate diagnosed 2013    Diabetes mellitus     Hypertension      Nutrition/Diet History  Food Allergies: NKFA  Factors Affecting Nutritional Intake: decreased appetite  Lab/Procedures/Meds  Recent Labs   Lab 06/07/23  1927 06/08/23  0523 06/09/23  0233      < > 144   K 6.8*   < > 3.6   *   < > 64*   CREATININE 3.95*   < > 2.16*   CALCIUM 8.6   < > 8.2*   ALBUMIN 3.5  --   --    *   < > 107   ALT 25  --   --    AST 10*  --   --    PHOS 6.6*  --   --     < > = values in this interval not displayed.   Note: BUN/Cr elevated but improving. Ca++ low. Will monitor.     Last A1c:   Lab Results   Component Value Date    HGBA1C 5.4 06/08/2023     Lab Results   Component Value Date    RBC 3.22 (L) 06/09/2023    HGB 9.9 (L) 06/09/2023    HCT 29.1 (L) 06/09/2023  "   MCV 90.4 06/09/2023    MCH 30.7 06/09/2023    MCHC 34.0 06/09/2023    TIBC 147 (L) 06/09/2023     Pertinent Labs Reviewed: reviewed  Pertinent Labs Comments: Sodium: 147 (H)  Potassium: 4.8  Chloride: 112 (H)  CO2: 17 (L)  Anion Gap: 23 (H)  BUN: 106 (H)  Creatinine: 3.39 (H)  BUN/CREAT RATIO: 31 (H)  eGFR: 17 (L)  Glucose: 180 (H)  Calcium: 8.3 (L)      (H): Data is abnormally high  (L): Data is abnormally low  Pertinent Medications Comments: rocephinm amlodipine  Anthropometrics  Temp: 97.9 °F (36.6 °C)  Height: 5' 11" (180.3 cm)  Height (inches): 71 in  Weight Method: Bed Scale  Weight: 78.3 kg (172 lb 9.9 oz)  Weight (lb): 172.62 lb  Ideal Body Weight (IBW), Male: 172 lb  % Ideal Body Weight, Male (lb): 100.36 %  BMI (Calculated): 24.1     Estimated/Assessed Needs  RMR ([place]-St. Jeor Equation): 1500.13     Temp: 97.9 °F (36.6 °C)Temporal  Weight Used For Calorie Calculations: 78 kg (172 lb)     Energy Calorie Requirements (kcal): 1950-2340kcal 25-30kcal/kg)  Weight Used For Protein Calculations: 78 kg (172 lb)  Protein Requirements: 47-55g (0.6-0.7g/kg)       RDA Method (mL): 1950     Nutrition by Nursing  Diet/Nutrition Received: other (see comments)           Last Bowel Movement: 06/08/23              Nutrition Follow-Up  RD Follow-up?: Yes     "

## 2023-06-09 NOTE — PT/OT/SLP PROGRESS
Physical Therapy      Patient Name:  Darron Goddard   MRN:  [id number]    Patient not seen today secondary to Nurse/ RAJWINDER hold (pt had increased HR of unclear source, has had EKG and awaiting EGD.). Will follow-up 6/10/23.

## 2023-06-09 NOTE — SUBJECTIVE & OBJECTIVE
Past Medical History:   Diagnosis Date    Cancer     prostate diagnosed 2013    Diabetes mellitus     Hypertension        Past Surgical History:   Procedure Laterality Date    RADIOACTIVE SEED IMPLANTATION OF PROSTATE         Review of patient's allergies indicates:  No Known Allergies    No current facility-administered medications on file prior to encounter.     Current Outpatient Medications on File Prior to Encounter   Medication Sig    albuterol sulfate (PROAIR RESPICLICK) 90 mcg/actuation inhaler Inhale 2 puffs into the lungs every 6 (six) hours as needed for Wheezing. Rescue    amLODIPine (NORVASC) 10 MG tablet Take 10 mg by mouth once daily.    aspirin (ECOTRIN) 81 MG EC tablet Take 81 mg by mouth once daily.    atenoloL (TENORMIN) 50 MG tablet Take 50 mg by mouth once daily.    atorvastatin (LIPITOR) 40 MG tablet Take 40 mg by mouth once daily.    enalapril (VASOTEC) 10 MG tablet Take 10 mg by mouth once daily.    ezetimibe (ZETIA) 10 mg tablet Take 10 mg by mouth once daily.    metFORMIN (GLUCOPHAGE) 500 MG tablet Take 500 mg by mouth 2 (two) times daily with meals.    niacin 500 MG CpSR Take 250 mg by mouth every evening.     Family History    None       Tobacco Use    Smoking status: Former     Types: Cigarettes    Smokeless tobacco: Never   Substance and Sexual Activity    Alcohol use: Never    Drug use: Never    Sexual activity: Not Currently     Review of Systems   Constitutional:  Positive for appetite change and fatigue. Negative for chills, fever and unexpected weight change.   HENT:  Positive for trouble swallowing. Negative for congestion, mouth sores, nosebleeds, sinus pain and sore throat.    Eyes:  Positive for visual disturbance.   Respiratory:  Negative for apnea, cough, chest tightness and shortness of breath.    Cardiovascular:  Negative for chest pain, palpitations and leg swelling.   Gastrointestinal:  Negative for abdominal pain, blood in stool, constipation, diarrhea, nausea and  vomiting.   Endocrine: Negative for polydipsia and polyuria.   Genitourinary:  Negative for decreased urine volume, difficulty urinating, dysuria, flank pain and frequency.   Musculoskeletal:  Negative for arthralgias, back pain and neck pain.   Skin:  Negative for rash.   Neurological:  Negative for tremors, syncope, light-headedness and headaches.   Hematological:  Does not bruise/bleed easily.   Psychiatric/Behavioral:  Negative for confusion and suicidal ideas.    Objective:     Vital Signs (Most Recent):  Temp: 97.9 °F (36.6 °C) (06/09/23 0705)  Pulse: 66 (06/09/23 0705)  Resp: 20 (06/09/23 0705)  BP: 127/78 (06/09/23 0705)  SpO2: 99 % (06/09/23 0705) Vital Signs (24h Range):  Temp:  [97.9 °F (36.6 °C)-98.3 °F (36.8 °C)] 97.9 °F (36.6 °C)  Pulse:  [56-87] 66  Resp:  [20] 20  SpO2:  [93 %-99 %] 99 %  BP: (126-148)/(44-78) 127/78     Weight: 78.3 kg (172 lb 9.9 oz)  Body mass index is 24.08 kg/m².     Physical Exam  Vitals and nursing note reviewed. Exam conducted with a chaperone present.   Constitutional:       Appearance: Normal appearance.   HENT:      Head: Atraumatic.      Mouth/Throat:      Mouth: Mucous membranes are moist.      Pharynx: Oropharynx is clear.   Eyes:      Conjunctiva/sclera: Conjunctivae normal.      Pupils: Pupils are equal, round, and reactive to light.      Comments: Legally Blind   Neck:      Vascular: No carotid bruit.   Cardiovascular:      Rate and Rhythm: Regular rhythm. Tachycardia present.      Pulses: Normal pulses.      Heart sounds: Normal heart sounds.   Pulmonary:      Effort: Pulmonary effort is normal.      Breath sounds: Normal breath sounds.   Abdominal:      General: Abdomen is flat. Bowel sounds are normal. There is no distension.      Palpations: Abdomen is soft.      Tenderness: There is no abdominal tenderness. There is no guarding or rebound.   Musculoskeletal:         General: No tenderness, deformity or signs of injury. Normal range of motion.      Cervical back:  Neck supple.      Right lower leg: No edema.      Left lower leg: No edema.   Skin:     General: Skin is warm and dry.      Capillary Refill: Capillary refill takes less than 2 seconds.      Coloration: Skin is not jaundiced or pale.      Findings: No bruising, lesion or rash.   Neurological:      General: No focal deficit present.      Mental Status: He is alert and oriented to person, place, and time.   Psychiatric:         Mood and Affect: Mood normal.            CRANIAL NERVES     CN III, IV, VI   Pupils are equal, round, and reactive to light.     Significant Labs: All pertinent labs within the past 24 hours have been reviewed.    Significant Imaging: I have reviewed all pertinent imaging results/findings within the past 24 hours.

## 2023-06-09 NOTE — TRANSFER OF CARE
"Anesthesia Transfer of Care Note    Patient: Darron Goddard    Procedure(s) Performed: * No procedures listed *    Patient location: GI    Anesthesia Type: general    Transport from OR: Transported from OR on room air with adequate spontaneous ventilation. Continuous ECG monitoring in transport. Continuous SpO2 monitoring in transport    Post pain: adequate analgesia    Post assessment: no apparent anesthetic complications    Post vital signs: stable    Level of consciousness: sedated and responds to stimulation    Nausea/Vomiting: no nausea/vomiting    Complications: none    Transfer of care protocol was followedComments: Good SV continue, NAD, VSS, RTRN      Last vitals:   Visit Vitals  /66 (Patient Position: Lying)   Pulse 69   Temp 36.1 °C (97 °F) (Skin)   Resp 14   Ht 5' 11" (1.803 m)   Wt 78 kg (172 lb)   SpO2 99%   BMI 23.99 kg/m²     "

## 2023-06-09 NOTE — ANESTHESIA PREPROCEDURE EVALUATION
06/09/2023  Darron Goddard is a 83 y.o., male.      Pre-op Assessment    I have reviewed the Patient Summary Reports.     I have reviewed the Nursing Notes. I have reviewed the NPO Status.   I have reviewed the Medications.     Review of Systems  Social:  Former Smoker, No Alcohol Use    Cardiovascular:   Hypertension, poorly controlled hyperlipidemia    Renal/:   Chronic Renal Disease LATONIA  History of hyperkalemia 3.6 today   Endocrine:   Diabetes, well controlled, type 2        Physical Exam  General: Cooperative, Alert and Oriented    Airway:  Mallampati: II   Mouth Opening: Normal  TM Distance: 4 - 6 cm  Tongue: Normal  Neck ROM: Normal ROM        Anesthesia Plan  Type of Anesthesia, risks & benefits discussed:    Anesthesia Type: Gen Natural Airway, MAC  Intra-op Monitoring Plan: Standard ASA Monitors  Post Op Pain Control Plan: multimodal analgesia and IV/PO Opioids PRN  Induction:  IV  Informed Consent: Informed consent signed with the Patient and all parties understand the risks and agree with anesthesia plan.  All questions answered. Patient consented to blood products? Yes  ASA Score: 3  Day of Surgery Review of History & Physical: I have interviewed and examined the patient. I have reviewed the patient's H&P dated: There are no significant changes.     Ready For Surgery From Anesthesia Perspective.     .   Past Medical History:   Diagnosis Date    Cancer     prostate diagnosed 2013    Diabetes mellitus     Hypertension        Past Surgical History:   Procedure Laterality Date    RADIOACTIVE SEED IMPLANTATION OF PROSTATE         History reviewed. No pertinent family history.    Social History     Socioeconomic History    Marital status:    Tobacco Use    Smoking status: Former     Types: Cigarettes    Smokeless tobacco: Never   Substance and Sexual Activity    Alcohol use: Never     Drug use: Never    Sexual activity: Not Currently     Social Determinants of Health     Financial Resource Strain: Low Risk     Difficulty of Paying Living Expenses: Not hard at all   Food Insecurity: No Food Insecurity    Worried About Running Out of Food in the Last Year: Never true    Ran Out of Food in the Last Year: Never true   Transportation Needs: No Transportation Needs    Lack of Transportation (Medical): No    Lack of Transportation (Non-Medical): No   Physical Activity: Inactive    Days of Exercise per Week: 0 days    Minutes of Exercise per Session: 0 min   Stress: No Stress Concern Present    Feeling of Stress : Not at all   Social Connections: Unknown    Frequency of Communication with Friends and Family: Three times a week    Frequency of Social Gatherings with Friends and Family: Three times a week    Active Member of Clubs or Organizations: No    Attends Club or Organization Meetings: Never    Marital Status:    Housing Stability: Low Risk     Unable to Pay for Housing in the Last Year: No    Number of Places Lived in the Last Year: 1    Unstable Housing in the Last Year: No       Current Facility-Administered Medications   Medication Dose Route Frequency Provider Last Rate Last Admin    acetaminophen tablet 1,000 mg  1,000 mg Oral Q6H PRN Es Pan MD        aspirin EC tablet 81 mg  81 mg Oral Daily Reg Chisholm MD        atorvastatin tablet 40 mg  40 mg Oral Daily Reg Chisholm MD        bisacodyL EC tablet 10 mg  10 mg Oral Daily PRN Es Pan MD        cefTRIAXone (ROCEPHIN) 1 g in dextrose 5 % in water (D5W) 5 % 100 mL IVPB (MB+)  1 g Intravenous Q24H Es Pan MD   Stopped at 06/09/23 0911    dextromethorphan-guaiFENesin  mg/5 ml liquid 10 mL  10 mL Oral Q6H PRN Es Pan MD        ezetimibe tablet 10 mg  10 mg Oral Daily Reg Chisholm MD        metoprolol tartrate (LOPRESSOR) tablet 50 mg  50 mg  Oral BID Reg Chisholm MD   50 mg at 06/09/23 1139    ondansetron injection 8 mg  8 mg Intravenous Q6H PRN Es Pan MD        simethicone chewable tablet 80 mg  1 tablet Oral TID PRN Es Pan MD        sodium bicarbonate 75 mEq in dextrose 5 % (D5W) 1,000 mL infusion   Intravenous Continuous Reg Chisholm  mL/hr at 06/09/23 1448 New Bag at 06/09/23 1448    traZODone tablet 50 mg  50 mg Oral Nightly PRN Es Pan MD           Review of patient's allergies indicates:  No Known Allergies    Patient Active Problem List   Diagnosis    LATONIA (acute kidney injury)    Hyperkalemia    Metabolic acidosis    Hypertension    Diabetes mellitus    Cancer    Acute cystitis with hematuria    Hyperlipidemia

## 2023-06-09 NOTE — CONSULTS
[facility] Tele[facility]  Gastroenterology  Consult Note    Patient Name: Darron Goddard  MRN: [id number]  Admission Date: 6/7/2023  Hospital Length of Stay: 2 days  Code Status: DNR   Attending Provider: Reg Chisholm MD   Consulting Provider: Mike Bernard MD  Primary Care Physician: Primary Doctor No  Principal Problem:LATONIA (acute kidney injury)    Inpatient consult to Gastroenterology  Consult performed by: Mike Bernard MD  Consult ordered by: Es Pan MD      Subjective:     HPI: GI consult was received re: dysphagia. EGD reveals reflux esophagitis with esophageal ulceration, hiatus hernia, gastritis with gastric ulcers, duodenitis. Biopsies of the stomach and distal esophagus were obtained and the esophagus was dilated with a 48F Mcgrath dilator.    Past Medical History:   Diagnosis Date    Cancer     prostate diagnosed 2013    Diabetes mellitus     Hypertension        Past Surgical History:   Procedure Laterality Date    RADIOACTIVE SEED IMPLANTATION OF PROSTATE         Review of patient's allergies indicates:  No Known Allergies  Family History    None       Tobacco Use    Smoking status: Former     Types: Cigarettes    Smokeless tobacco: Never   Substance and Sexual Activity    Alcohol use: Never    Drug use: Never    Sexual activity: Not Currently     Review of Systems  Objective:     Vital Signs (Most Recent):  Temp: 97 °F (36.1 °C) (06/09/23 1629)  Pulse: 69 (06/09/23 1632)  Resp: (!) 36 (06/09/23 1632)  BP: 127/64 (06/09/23 1632)  SpO2: 95 % (06/09/23 1632) Vital Signs (24h Range):  Temp:  [97 °F (36.1 °C)-98.3 °F (36.8 °C)] 97 °F (36.1 °C)  Pulse:  [] 69  Resp:  [14-36] 36  SpO2:  [93 %-99 %] 95 %  BP: (108-139)/(44-83) 127/64     Weight: 78 kg (172 lb) (06/09/23 1539)  Body mass index is 23.99 kg/m².      Intake/Output Summary (Last 24 hours) at 6/9/2023 1634  Last data filed at 6/9/2023 1627  Gross per 24 hour   Intake 50 ml   Output --   Net  50 ml       Lines/Drains/Airways       Peripheral Intravenous Line  Duration                  Peripheral IV - Single Lumen 06/09/23 0545 22 G Posterior;Right Wrist <1 day                    Physical Exam    Significant Labs:  CBC:   Recent Labs   Lab 06/07/23  1927 06/07/23  2246 06/08/23  0523 06/09/23  0233   WBC 11.54*  --  9.46 8.78   HGB 11.3*  --  10.3* 9.9*   HCT 35.6* 28* 31.3* 29.1*     --  183 178       Significant Imaging:  Imaging results within the past 24 hours have been reviewed.    Assessment/Plan:     Active Diagnoses:    Diagnosis Date Noted POA    PRINCIPAL PROBLEM:  LATONIA (acute kidney injury) [N17.9] 06/07/2023 Yes    Esophageal dysphagia [R13.19] 06/09/2023 Unknown    Acute gastric ulcer without hemorrhage or perforation [K25.3] 06/09/2023 Unknown    Esophagitis [K20.90] 06/09/2023 Unknown    HH (hiatus hernia) [K44.9] 06/09/2023 Unknown    Duodenitis [K29.80] 06/09/2023 Unknown    Hyperkalemia [E87.5] 06/07/2023 Yes    Metabolic acidosis [E87.20] 06/07/2023 Yes    Hypertension [I10]  Yes    Diabetes mellitus [E11.9]  Yes    Cancer [C80.1]  Yes    Acute cystitis with hematuria [N30.01]  Yes    Hyperlipidemia [E78.5]  Yes      Problems Resolved During this Admission:       Imp: LATONIA with hyperkalemia, anemia of chronic disease, esophagitis with dysphagia, now s/p dilation; hiatus hernia, multiple non-bleeding gastric ulcers, duodenitis.  Rec: PPI, avoid nsaids; treat H.pylori if present on biopsy.    Thank you for your consult. I will sign off. Please contact us if you have any additional questions.    Mike Bernard MD  Gastroenterology  [facility]

## 2023-06-09 NOTE — H&P
[facility] - [address] [place] [address]  Gastroenterology  H&P    Patient Name: Darron Goddard  MRN: [id number]  Admission Date: 6/7/2023  Code Status: DNR    Attending Provider: Reg Chisholm MD   Primary Care Physician: Primary Doctor No  Principal Problem:LATONIA (acute kidney injury)    Subjective:     History of Present Illness: Pt has anemia and mild dysphagia; for egd +/- dilation of esophagus.    Past Medical History:   Diagnosis Date    Cancer     prostate diagnosed 2013    Diabetes mellitus     Hypertension        Past Surgical History:   Procedure Laterality Date    RADIOACTIVE SEED IMPLANTATION OF PROSTATE         Review of patient's allergies indicates:  No Known Allergies  Family History    None       Tobacco Use    Smoking status: Former     Types: Cigarettes    Smokeless tobacco: Never   Substance and Sexual Activity    Alcohol use: Never    Drug use: Never    Sexual activity: Not Currently     Review of Systems   HENT:  Positive for hearing loss and trouble swallowing.    Respiratory: Negative.     Cardiovascular: Negative.    Gastrointestinal: Negative.    Objective:     Vital Signs (Most Recent):  Temp: 98.3 °F (36.8 °C) (06/09/23 1000)  Pulse: 67 (06/09/23 1539)  Resp: 18 (06/09/23 1539)  BP: 108/66 (06/09/23 1539)  SpO2: (!) 93 % (06/09/23 1539) Vital Signs (24h Range):  Temp:  [97.9 °F (36.6 °C)-98.3 °F (36.8 °C)] 98.3 °F (36.8 °C)  Pulse:  [57-85] 67  Resp:  [18-20] 18  SpO2:  [93 %-99 %] 93 %  BP: (108-139)/(44-78) 108/66     Weight: 78 kg (172 lb) (06/09/23 1539)  Body mass index is 23.99 kg/m².    No intake or output data in the 24 hours ending 06/09/23 1619    Lines/Drains/Airways       Peripheral Intravenous Line  Duration                  Peripheral IV - Single Lumen 06/09/23 0545 22 G Posterior;Right Wrist <1 day                    Physical Exam  Vitals reviewed.   Constitutional:       General: He is not in acute distress.     Appearance: Normal appearance. He is  well-developed. He is ill-appearing.   HENT:      Head: Normocephalic and atraumatic.      Nose: Nose normal.   Eyes:      Pupils: Pupils are equal, round, and reactive to light.   Cardiovascular:      Rate and Rhythm: Normal rate and regular rhythm.   Pulmonary:      Effort: Pulmonary effort is normal.      Breath sounds: Normal breath sounds. No wheezing.   Abdominal:      General: Abdomen is flat. Bowel sounds are normal. There is no distension.      Palpations: Abdomen is soft.      Tenderness: There is no abdominal tenderness. There is no guarding.   Skin:     General: Skin is warm and dry.      Coloration: Skin is pale. Skin is not jaundiced.   Neurological:      Mental Status: He is alert.   Psychiatric:         Attention and Perception: Attention normal.         Mood and Affect: Affect normal.         Speech: Speech normal.         Behavior: Behavior is cooperative.      Comments: Pt was calm while speaking.       Significant Labs:  CBC:   Recent Labs   Lab 06/07/23 1927 06/07/23 2246 06/08/23 0523 06/09/23  0233   WBC 11.54*  --  9.46 8.78   HGB 11.3*  --  10.3* 9.9*   HCT 35.6* 28* 31.3* 29.1*     --  183 178     CMP:   Recent Labs   Lab 06/07/23 1927 06/08/23 0523 06/09/23  0233   *   < > 125*   CALCIUM 8.6   < > 8.2*   ALBUMIN 3.5  --   --    PROT 6.9  --   --       < > 144   K 6.8*   < > 3.6   CO2 10*   < > 27   *   < > 107   *   < > 64*   CREATININE 3.95*   < > 2.16*   ALKPHOS 101  --   --    ALT 25  --   --    AST 10*  --   --    BILITOT 0.3  --   --     < > = values in this interval not displayed.       Significant Imaging:  Imaging results within the past 24 hours have been reviewed.    Assessment/Plan:     Active Diagnoses:    Diagnosis Date Noted POA    PRINCIPAL PROBLEM:  LATONIA (acute kidney injury) [N17.9] 06/07/2023 Yes    Hyperkalemia [E87.5] 06/07/2023 Yes    Metabolic acidosis [E87.20] 06/07/2023 Yes    Hypertension [I10]  Yes    Diabetes mellitus [E11.9]   Yes    Cancer [C80.1]  Yes    Acute cystitis with hematuria [N30.01]  Yes    Hyperlipidemia [E78.5]  Yes      Problems Resolved During this Admission:       Imp: dysphagia  Plan: egd with dilation    Mike Bernard MD  Gastroenterology  [facility] - [address] [place] [address]

## 2023-06-09 NOTE — NURSING
"@900 HR high 140's, notified Dr. Chisholm.     @0907 EKG obtained noted to be SVT. MD notified. Pt denies shortness of breath. Alert and oriented x4. Wife present at bedside.    @0915 's Dr. Chisholm present and new orders for adenosine and lopressor. Pt placed on cardiac defibrillator. 's    @0925 Adenosine 6mg iv given x1 per MD at bedside     @0930 Adenosine 6mg iv given x1 per MD at bedside    @0940 Metoprolol 5mg iv given x1 per MD at bedside.     Pt tolerated medications well. Denies chest pain and shortness of breath. States "I feel better."    @1008 pt in SR's 80's.  "

## 2023-06-09 NOTE — DISCHARGE INSTRUCTIONS
Procedure Date  6/9/23     Impression  Overall Impression: Mucosa of the esophagus was ulcerated distally without bleeding. Distal esophageal biopsies were obtained and the esophagus was dilated with a 48F Mcgrath dilator. A 5cm hiatus hernia was noted and multiple non-bleeding ulcers were present on the incisura, body and antrum of the stomach. Gastric biopsies were obtained. Duodenitis was noted in the bulb without bleeding.     Recommendation    Await pathology results      Avoid nsaids; ppi daily, treat H.pylori if + on biopsy.   Discharge:  Disp: DC to hospital room and resume tx.  Dx: esophagitis, hiatus hernia, esophageal dysphagia, gastritis with gastric ulcers; s/p dilation of esophagus    No driving today, no operating heavy machinery, no signing any legal documents until tomorrow.    Drink lots of fluids, resume regular diet.  Take your normal medications.

## 2023-06-09 NOTE — PLAN OF CARE
[facility]  Initial Discharge Assessment       Primary Care Provider: Primary Doctor No    Admission Diagnosis: Hyperkalemia [E87.5]  Primary hypertension [I10]  Metabolic acidosis [E87.20]  Cancer [C80.1]  Acute cystitis with hematuria [N30.01]  LATONIA (acute kidney injury) [N17.9]  Abdominal pain [R10.9]  Acute kidney injury [N17.9]  Type 2 diabetes mellitus with other specified complication, without long-term current use of insulin [E11.69]    Admission Date: 6/7/2023  Expected Discharge Date:     Transition of Care Barriers: None    Payor: BLUE CROSS BLUE SHIELD / Plan: BCBS ALL OUT OF STATE / Product Type: PPO /     Extended Emergency Contact Information  Primary Emergency Contact: Nevin Goddard  Address: [address] of [place]  Mobile Phone: [phone]  Relation: Spouse  Preferred language: English   needed? No    Discharge Plan A: Home with family  Discharge Plan B: Home with family      [facility] [place] [address]  Phone: [phone] Fax: [phone]      Initial Assessment (most recent)       Adult Discharge Assessment - 06/09/23 0920          Discharge Assessment    Assessment Type Discharge Planning Assessment     Confirmed/corrected address, phone number and insurance Yes     Source of Information patient;family     Communicated PASQUALE with patient/caregiver Date not available/Unable to determine     People in Home spouse     Do you expect to return to your current living situation? Yes     Do you have help at home or someone to help you manage your care at home? Yes     Prior to hospitilization cognitive status: Unable to Assess     Current cognitive status: Alert/Oriented     Equipment Currently Used at Home wheelchair;walker, rolling;cane, straight     Patient currently being followed by outpatient case management? No     Do you currently have  service(s) that help you manage your care at home? No     Do you take prescription medications? Yes     Do you have prescription coverage? Yes     Coverage bcbs     Do you have any problems affording any of your prescribed medications? No     Is the patient taking medications as prescribed? yes     Who is going to help you get home at discharge? spouse     How do you get to doctors appointments? family or friend will provide     Are you on dialysis? No     Discharge Plan A Home with family     Discharge Plan B Home with family     DME Needed Upon Discharge  none     Discharge Plan discussed with: Patient;Spouse/sig other     Transition of Care Barriers None        Physical Activity    On average, how many days per week do you engage in moderate to strenuous exercise (like a brisk walk)? 0 days     On average, how many minutes do you engage in exercise at this level? 0 min        Financial Resource Strain    How hard is it for you to pay for the very basics like food, housing, medical care, and heating? Not hard at all        Housing Stability    In the last 12 months, was there a time when you were not able to pay the mortgage or rent on time? No     In the last 12 months, how many places have you lived? 1     In the last 12 months, was there a time when you did not have a steady place to sleep or slept in a shelter (including now)? No        Transportation Needs    In the past 12 months, has lack of transportation kept you from medical appointments or from getting medications? No     In the past 12 months, has lack of transportation kept you from meetings, work, or from getting things needed for daily living? No        Food Insecurity    Within the past 12 months, you worried that your food would run out before you got the money to buy more. Never true     Within the past 12 months, the food you bought just didn't last and you didn't have money to get more. Never true        Stress    Do you feel stress - tense,  restless, nervous, or anxious, or unable to sleep at night because your mind is troubled all the time - these days? Not at all        Social Connections    In a typical week, how many times do you talk on the phone with family, friends, or neighbors? Three times a week     How often do you get together with friends or relatives? Three times a week     Do you belong to any clubs or organizations such as Adventism groups, unions, fraternal or athletic groups, or school groups? No     How often do you attend meetings of the clubs or organizations you belong to? Never     Are you , , , , never , or living with a partner?         Alcohol Use    Q1: How often do you have a drink containing alcohol? Never     Q2: How many drinks containing alcohol do you have on a typical day when you are drinking? Patient does not drink     Q3: How often do you have six or more drinks on one occasion? Never                        Ss spoke with pt and pt's spouse and pt lives at home with spouse and plans to return at d/c. No d/c needs stated at this time. Ss following.

## 2023-06-09 NOTE — PT/OT/SLP PROGRESS
Occupational Therapy      Patient Name:  Darron Goddard   MRN:  [id number]    Patient not seen today secondary to Nurse/ RAJWINDER hold.  Nurse reports pt has an elevated heart rate.Will follow-up 6/10/23.    6/9/2023

## 2023-06-09 NOTE — PT/OT/SLP EVAL
Speech Language Pathology Evaluation  Bedside Swallow    Patient Name:  Darron Goddard   MRN:  [id number]  Admitting Diagnosis: LATONIA (acute kidney injury)    Recommendations:                 General Recommendations:  Follow-up not indicated  Diet recommendations:  Mechanical soft, Thin (Please add ensure/boost to meal trays. Pt/his wife report that he does like soup.)   Aspiration Precautions: 1 bite/sip at a time, Alternating bites/sips, HOB to 90 degrees, Remain upright 30 minutes post meal, Small bites/sips, and Standard aspiration precautions   General Precautions: Standard,    Communication strategies:  none    Assessment:     Darron Goddard is a 83 y.o. male with an SLP diagnosis of  difficulty swallowing .  He presents with no difficulties during BEDSIDE SWALLOW EVALUATION.    History:     Past Medical History:   Diagnosis Date    Cancer     prostate diagnosed 2013    Diabetes mellitus     Hypertension        Past Surgical History:   Procedure Laterality Date    RADIOACTIVE SEED IMPLANTATION OF PROSTATE         Social History: Patient lives with his wife.    Prior Intubation HX:  n/a    Modified Barium Swallow: n/a    Chest X-Rays: see chart    Prior diet: Patient eats soft foods at home; however, he reports he has no appetite and nothing tastes right.    Occupation/hobbies/homemaking: not stated.    Subjective     Patient lying in bed asleep but easily aroused. Patient agreeable to BEDSIDE SWALLOW EVALUATION. Patient's wife present for some of swallow eval.  Patient goals: to go home     Pain/Comfort:  Pain Rating 1: 0/10    Respiratory Status: Room air    Objective:     Oral Musculature Evaluation  Oral Musculature: WFL  Dentition: edentulous  Secretion Management: adequate  Mucosal Quality: adequate  Oral Labial Strength and Mobility: WFL  Lingual Strength and Mobility: WF    Bedside Swallow Eval:   Consistencies Assessed:  Thin liquids No difficulties noted with small trials of water via a straw.    Puree No difficulties with small bites of pudding.   Soft solids No difficulties with small pieces of a maurizio cracker. Patient reported nothing tasted good but the water.      Oral Phase:   WFL    Pharyngeal Phase:   no overt clinical signs/symptoms of aspiration  no overt clinical signs/symptoms of pharyngeal dysphagia    Compensatory Strategies  None    Treatment: Rec a mechanical soft diet with thin liquids as tolerated. Please add ensure/boost to meal trays. Patient also reported that he liked soup. Skilled SPEECH THERAPY not indicated.     Goals:   Multidisciplinary Problems       SLP Goals       Not on file                    Plan:     Patient to be seen:      Plan of Care expires:     Plan of Care reviewed with:      SLP Follow-Up:  No       Discharge recommendations:      Barriers to Discharge:  None    Time Tracking:     SLP Treatment Date:      Speech Start Time:  1225  Speech Stop Time:  1245     Speech Total Time (min):  20 min    Billable Minutes: Eval Swallow and Oral Function 20 06/09/2023

## 2023-06-09 NOTE — CONSULTS
HPI: 84 yo M presents to the ED for constipation.  He states he has not had a BM in nearly two weels.  He denies abdominal pain.  He says he just doesn't feel well.  His wife says he has been fatigued with decreased appetite for the past two weeks and she says he was too week today to get out of bed.  He looks much better now that he has gotten some IVF.  He did have an abdominal CT wo contrast which did not show an obstruction of impaction nor hydronephrosis but renal lesions noted.  Renal US ordered to help differentiate solid vs cystic lesions.       Patient states he was told ten years ago at [facility] by Dr. Ramirez that he had renal cancer and a nephrectomy was recommended.  He left and never went back.  He found a new PCP at the health department in [place] that would fill his prescriptions and he has not been back to a doctor since that time.  He did have radioactive seed implants for prostate cancer placed at [facility] 20 years ago and says he has been fine since that time and says at his age he did not want aggressive measures anyway.  Today he has hematuria and possible UTI.  Lactic acid 1.7.  Was given zosyn in the ED and cultured.       Patient has marked prerenal azotemia hyperkalemia and metabolic acidosis.  Will order urine na and urine creat to calculate FeNa.       ROS below but also states his decreased oral intake is due to dysphagia.  He says for the past few weeks he has even had trouble swallowing water and that he has not taken his meds in nearly two weeks.  He feels they get stuck in his throat.  Important to mention is he has been a smoker for 65 years but quit recently.               Past Medical History:   Diagnosis Date    Cancer       prostate diagnosed 2013    Diabetes mellitus      Hypertension        The above is from the history of Dr. Es Pan.  I was asked to see patient because of known prostate cancer and probable renal cell carcinoma.  According to  patient's wife, I apparently diagnosed patient with prostate cancer and sent him to [facility] where he had seed implantation.  I suspect that is over 20 years ago that that occurred as I have not sent anyone for seed implantations in a very long time.  That is no longer consider standard treatment for prostate cancer.  Patient has a solid tumor of the left kidney plus multiple renal cysts.  Dr. Harding in [facility] who is apparently an internist tried to get him to have his left kidney removed.  It is uncertain which urologist saw him in [place].  At any rate patient declined any treatment for that and that was several years ago, probably about 10.  Patient is not interested in doing much further evaluation for either of those problems.  We will check PSA just to make sure and if it is greatly elevated he might consider re-evaluation.  I will check him again while hospitalized.

## 2023-06-10 VITALS
TEMPERATURE: 98 F | SYSTOLIC BLOOD PRESSURE: 122 MMHG | HEART RATE: 65 BPM | OXYGEN SATURATION: 96 % | BODY MASS INDEX: 24.08 KG/M2 | DIASTOLIC BLOOD PRESSURE: 55 MMHG | WEIGHT: 172 LBS | RESPIRATION RATE: 18 BRPM | HEIGHT: 71 IN

## 2023-06-10 LAB
ANION GAP SERPL CALCULATED.3IONS-SCNC: 11 MMOL/L (ref 7–16)
BASOPHILS # BLD AUTO: 0.03 K/UL (ref 0–0.2)
BASOPHILS NFR BLD AUTO: 0.4 % (ref 0–1)
BUN SERPL-MCNC: 34 MG/DL (ref 7–18)
BUN/CREAT SERPL: 19 (ref 6–20)
CALCIUM SERPL-MCNC: 7.9 MG/DL (ref 8.5–10.1)
CHLORIDE SERPL-SCNC: 103 MMOL/L (ref 98–107)
CO2 SERPL-SCNC: 31 MMOL/L (ref 21–32)
CREAT SERPL-MCNC: 1.77 MG/DL (ref 0.7–1.3)
DIFFERENTIAL METHOD BLD: ABNORMAL
EGFR (NO RACE VARIABLE) (RUSH/TITUS): 38 ML/MIN/1.73M2
EOSINOPHIL # BLD AUTO: 0.31 K/UL (ref 0–0.5)
EOSINOPHIL NFR BLD AUTO: 4.1 % (ref 1–4)
ERYTHROCYTE [DISTWIDTH] IN BLOOD BY AUTOMATED COUNT: 12.6 % (ref 11.5–14.5)
GLUCOSE SERPL-MCNC: 135 MG/DL (ref 74–106)
HCT VFR BLD AUTO: 28.4 % (ref 40–54)
HGB BLD-MCNC: 9.3 G/DL (ref 13.5–18)
IMM GRANULOCYTES # BLD AUTO: 0.04 K/UL (ref 0–0.04)
IMM GRANULOCYTES NFR BLD: 0.5 % (ref 0–0.4)
LYMPHOCYTES # BLD AUTO: 2.06 K/UL (ref 1–4.8)
LYMPHOCYTES NFR BLD AUTO: 27.1 % (ref 27–41)
MCH RBC QN AUTO: 30.2 PG (ref 27–31)
MCHC RBC AUTO-ENTMCNC: 32.7 G/DL (ref 32–36)
MCV RBC AUTO: 92.2 FL (ref 80–96)
MONOCYTES # BLD AUTO: 0.9 K/UL (ref 0–0.8)
MONOCYTES NFR BLD AUTO: 11.8 % (ref 2–6)
MPC BLD CALC-MCNC: 10.8 FL (ref 9.4–12.4)
NEUTROPHILS # BLD AUTO: 4.27 K/UL (ref 1.8–7.7)
NEUTROPHILS NFR BLD AUTO: 56.1 % (ref 53–65)
NRBC # BLD AUTO: 0 X10E3/UL
NRBC, AUTO (.00): 0 %
PLATELET # BLD AUTO: 148 K/UL (ref 150–400)
POTASSIUM SERPL-SCNC: 3.2 MMOL/L (ref 3.5–5.1)
RBC # BLD AUTO: 3.08 M/UL (ref 4.6–6.2)
SODIUM SERPL-SCNC: 142 MMOL/L (ref 136–145)
WBC # BLD AUTO: 7.61 K/UL (ref 4.5–11)

## 2023-06-10 PROCEDURE — 63600175 PHARM REV CODE 636 W HCPCS: Performed by: HOSPITALIST

## 2023-06-10 PROCEDURE — 99239 PR HOSPITAL DISCHARGE DAY,>30 MIN: ICD-10-PCS | Mod: ,,, | Performed by: INTERNAL MEDICINE

## 2023-06-10 PROCEDURE — 99239 HOSP IP/OBS DSCHRG MGMT >30: CPT | Mod: ,,, | Performed by: INTERNAL MEDICINE

## 2023-06-10 PROCEDURE — 85025 COMPLETE CBC W/AUTO DIFF WBC: CPT | Performed by: INTERNAL MEDICINE

## 2023-06-10 PROCEDURE — 25000003 PHARM REV CODE 250: Performed by: INTERNAL MEDICINE

## 2023-06-10 PROCEDURE — 80048 BASIC METABOLIC PNL TOTAL CA: CPT | Performed by: INTERNAL MEDICINE

## 2023-06-10 PROCEDURE — 97165 OT EVAL LOW COMPLEX 30 MIN: CPT

## 2023-06-10 PROCEDURE — 97162 PT EVAL MOD COMPLEX 30 MIN: CPT

## 2023-06-10 PROCEDURE — 25000003 PHARM REV CODE 250: Performed by: HOSPITALIST

## 2023-06-10 RX ORDER — PANTOPRAZOLE SODIUM 40 MG/1
40 TABLET, DELAYED RELEASE ORAL DAILY
Qty: 30 TABLET | Refills: 0 | Status: SHIPPED | OUTPATIENT
Start: 2023-06-10 | End: 2023-06-10 | Stop reason: SDUPTHER

## 2023-06-10 RX ORDER — GLIPIZIDE 5 MG/1
2.5 TABLET ORAL
Qty: 30 TABLET | Refills: 0 | Status: SHIPPED | OUTPATIENT
Start: 2023-06-10 | End: 2024-06-09

## 2023-06-10 RX ORDER — PANTOPRAZOLE SODIUM 40 MG/1
40 TABLET, DELAYED RELEASE ORAL DAILY
Qty: 30 TABLET | Refills: 0 | Status: SHIPPED | OUTPATIENT
Start: 2023-06-10 | End: 2023-06-13 | Stop reason: SDUPTHER

## 2023-06-10 RX ORDER — CEFUROXIME AXETIL 250 MG/1
250 TABLET ORAL 2 TIMES DAILY
Qty: 14 TABLET | Refills: 0 | Status: SHIPPED | OUTPATIENT
Start: 2023-06-10

## 2023-06-10 RX ORDER — CEFUROXIME AXETIL 250 MG/1
250 TABLET ORAL 2 TIMES DAILY
Qty: 14 TABLET | Refills: 0 | Status: SHIPPED | OUTPATIENT
Start: 2023-06-10 | End: 2023-06-10 | Stop reason: SDUPTHER

## 2023-06-10 RX ORDER — GLIPIZIDE 5 MG/1
2.5 TABLET ORAL
Qty: 30 TABLET | Refills: 0 | Status: SHIPPED | OUTPATIENT
Start: 2023-06-10 | End: 2023-06-10 | Stop reason: SDUPTHER

## 2023-06-10 RX ORDER — POTASSIUM CHLORIDE 20 MEQ/1
40 TABLET, EXTENDED RELEASE ORAL ONCE
Status: COMPLETED | OUTPATIENT
Start: 2023-06-10 | End: 2023-06-10

## 2023-06-10 RX ADMIN — EZETIMIBE 10 MG: 10 TABLET ORAL at 08:06

## 2023-06-10 RX ADMIN — CEFTRIAXONE SODIUM 1 G: 1 INJECTION, POWDER, FOR SOLUTION INTRAMUSCULAR; INTRAVENOUS at 08:06

## 2023-06-10 RX ADMIN — SODIUM BICARBONATE: 84 INJECTION, SOLUTION INTRAVENOUS at 03:06

## 2023-06-10 RX ADMIN — ASPIRIN 81 MG: 81 TABLET, COATED ORAL at 08:06

## 2023-06-10 RX ADMIN — PANTOPRAZOLE SODIUM 40 MG: 40 TABLET, DELAYED RELEASE ORAL at 08:06

## 2023-06-10 RX ADMIN — ATORVASTATIN CALCIUM 40 MG: 40 TABLET, FILM COATED ORAL at 08:06

## 2023-06-10 RX ADMIN — POTASSIUM CHLORIDE 40 MEQ: 1500 TABLET, EXTENDED RELEASE ORAL at 08:06

## 2023-06-10 RX ADMIN — METOPROLOL TARTRATE 50 MG: 50 TABLET, FILM COATED ORAL at 08:06

## 2023-06-10 NOTE — DISCHARGE SUMMARY
[facility] - [facility] Tele[facility] Hospital Medicine  Discharge Summary      Patient Name: Darron Goddard  MRN: [id number]  PAWAN: [id number]  Patient Class: IP- Inpatient  Admission Date: 6/7/2023  Hospital Length of Stay: 3 days  Discharge Date and Time:  06/10/2023 7:51 AM  Attending Physician: Reg Chisholm MD   Discharging Provider: Reg Chisholm MD  Primary Care Provider: Primary Doctor Jade    Primary Care Team: Networked reference to record PCT     HPI:   84 yo M presents to the ED for constipation.  He states he has not had a BM in nearly two weels.  He denies abdominal pain.  He says he just doesn't feel well.  His wife says he has been fatigued with decreased appetite for the past two weeks and she says he was too week today to get out of bed.  He looks much better now that he has gotten some IVF.  He did have an abdominal CT wo contrast which did not show an obstruction of impaction nor hydronephrosis but renal lesions noted.  Renal US ordered to help differentiate solid vs cystic lesions.      Patient states he was told ten years ago at [facility] by Dr. Ramirez that he had renal cancer and a nephrectomy was recommended.  He left and never went back.  He found a new PCP at the health department in [place] that would fill his prescriptions and he has not been back to a doctor since that time.  He did have radioactive seed implants for prostate cancer placed at [facility] 20 years ago and says he has been fine since that time and says at his age he did not want aggressive measures anyway.  Today he has hematuria and possible UTI.  Lactic acid 1.7.  Was given zosyn in the ED and cultured.      Patient has marked prerenal azotemia hyperkalemia and metabolic acidosis.  Will order urine na and urine creat to calculate FeNa.      ROS below but also states his decreased oral intake is due to dysphagia.  He says for the past few weeks he has even had trouble swallowing  water and that he has not taken his meds in nearly two weeks.  He feels they get stuck in his throat.  Important to mention is he has been a smoker for 65 years but quit recently.        * No surgery found *      Hospital Course:   06/08/2023   Patient has no new complaints, abdominal pain is improving.    Still feels weak all over.    Patient was told to have renal cell carcinoma and needed nephrectomy 10 years ago, patient declined at that time.    Patient has history of prostate cancer status post radioactive seeds implants 20 years ago.  PTT   Vital signs are stable, he is afebrile.    Kidney ultrasound showed solid mass located within the inferior pole of the left kidney measuring 4.3 cm.    Urine culture Gram-negative bacilli, blood cultures negative to date.    White count 9 hemoglobin 10 platelets 183 INR 1.2 sodium 147 bicarb 17 up from 10.    Creatinine trending down 3.3 today.    Glucose 180.    HDL 27 triglyceride 189 LDL 12 PTT   Lactic acid 1.8.    Urine sodium 13 and urine creatinine 111.     Patient acute on chronic renal failure likely related to intravascular volume depletion/prerenal, fraction excretion of sodium is 0.3% .  We will continue IV fluids hydration.  Avoid nephrotoxic drugs   Monitor input output charting closely.  Metabolic acidosis with normal lactic acidosis likely related to renal failure, no evidence of sepsis, continue bicarb infusion continue to monitor closely, clinically improving.  We will continue Rocephin and we will follow urine culture results.    We will consult PT/OT.  Left renal cell carcinoma, declined surgery in the past, consulted Urology for their input.    Patient code status DNR   Continue same current management     06/09/2023   Patient was seen earlier this morning, he was feeling fine, no chest pain shortness a breath and he is requesting to go home.    Blood pressure 120/80 heart rate heart rate 66 afebrile.    White count down to 8 hemoglobin 9 platelets  count was having a to the OR by capacity 147 with an 1152 with iron saturation 55.    Creatinine down to 2.1 with glucose of 125.    Urine culture grew Klebsiella pneumoniae sensitive to ceftriaxone.     patient was seen and evaluated by Dr. Underwood, recommended medical management with oral antibiotics, no over invasive measures..    Will do swallowing eval.    One continue with dietitian recommendations.    Shortly after he was seen patient went into SVT, heart rate of 160, stable with blood pressure 130/86, received 2 doses of adenosine with no change in SVT, given Lopressor 5 mg IV push and his heart rate down to 70.  Patient was on beta blockers at home.    I will start him on Lopressor 50 p.o. b.i.d. and I will give him a bolus of fluids.    Continue close monitoring.    Echo ordered   Renal function improving.  Discussed with his wife at bedside.    06/10/2023   Patient feels much better, no chest pain no shortness of breath, no more episodes of SVT since yesterday morning, apparently patient was not getting his beta blockers while in the hospital, started back on beta blockers, his heart rate is normal and he is in normal sinus rhythm.    No abdominal pain no nausea no vomiting.    His white blood cell count 7 hemoglobin 9 platelet count 148 sodium 142 potassium 3.2 creatinine down to 1.7 glucose 135 calcium 7.9.    Echocardiogram showed ejection fraction 55% no AFib.  No valvular heart disease.    Urine culture grew Klebsiella pneumoniae.    Patient will be discharged home on cefuroxime 250 mg p.o. b.i.d. for 7 more days.    Discontinued ACE inhibitors due to renal failure.    Renal function improved with hydration, creatinine down to baseline.  We will discontinue metformin due to renal impairment.  We will start low-dose glipizide for diabetes control.  Discussed with Dr. Underwood, patient himself as well does not want to have any invasive procedures done.   Patient had EGD with esophageal dilatation, dysphagia  resolved.  Tolerating diet well.    Needs to follow up with PCP as outpatient.       Goals of Care Treatment Preferences:  Code Status: DNR      Consults:   Consults (From admission, onward)        Status Ordering Provider     Inpatient consult to Gastroenterology  Once        Provider:  (Not yet assigned)    Completed TOOTIE MERA     Inpatient consult to Urology  Once        Provider:  Joseph Underwood Jr., MD    Completed WILFRIDO AGUILAR     Inpatient consult to Registered Dietitian/Nutritionist  Once        Provider:  (Not yet assigned)    Completed WILFRIDO AGUILAR          No new Assessment & Plan notes have been filed under this hospital service since the last note was generated.  Service: Hospital Medicine    Final Active Diagnoses:    Diagnosis Date Noted POA    PRINCIPAL PROBLEM:  LATONIA (acute kidney injury) [N17.9] 06/07/2023 Yes    Esophageal dysphagia [R13.19] 06/09/2023 Yes    Acute gastric ulcer without hemorrhage or perforation [K25.3] 06/09/2023 Yes    Esophagitis [K20.90] 06/09/2023 Yes    HH (hiatus hernia) [K44.9] 06/09/2023 Yes    Duodenitis [K29.80] 06/09/2023 Yes    Hyperkalemia [E87.5] 06/07/2023 Yes    Metabolic acidosis [E87.20] 06/07/2023 Yes    Hypertension [I10]  Yes    Diabetes mellitus [E11.9]  Yes    Cancer [C80.1]  Yes    Acute cystitis with hematuria [N30.01]  Yes    Hyperlipidemia [E78.5]  Yes      Problems Resolved During this Admission:       Discharged Condition: good    Disposition: Home-Health Care [facility]    Follow Up:   Follow-up Information     Primary Doctor No Follow up in 1 week(s).                     Patient Instructions:      Diet diabetic       Significant Diagnostic Studies: N/A    Pending Diagnostic Studies:     Procedure Component Value Units Date/Time    Surgical Pathology [[id number]] Collected: 06/09/23 1623    Order Status: Sent Lab Status: No result     Specimen: Tissue from Stomach, Tissue from Esophagus          Medications:  Reconciled Home  Medications:      Medication List      START taking these medications    cefUROXime 250 MG tablet  Commonly known as: CEFTIN  Take 1 tablet (250 mg total) by mouth 2 (two) times daily.     glipiZIDE 5 MG tablet  Commonly known as: GLUCOTROL  Take 0.5 tablets (2.5 mg total) by mouth 2 (two) times daily before meals.     pantoprazole 40 MG tablet  Commonly known as: PROTONIX  Take 1 tablet (40 mg total) by mouth once daily.        CONTINUE taking these medications    albuterol sulfate 90 mcg/actuation inhaler  Commonly known as: PROAIR RESPICLICK  Inhale 2 puffs into the lungs every 6 (six) hours as needed for Wheezing. Rescue     amLODIPine 10 MG tablet  Commonly known as: NORVASC  Take 10 mg by mouth once daily.     aspirin 81 MG EC tablet  Commonly known as: ECOTRIN  Take 81 mg by mouth once daily.     atenoloL 50 MG tablet  Commonly known as: TENORMIN  Take 50 mg by mouth once daily.     atorvastatin 40 MG tablet  Commonly known as: LIPITOR  Take 40 mg by mouth once daily.     ezetimibe 10 mg tablet  Commonly known as: ZETIA  Take 10 mg by mouth once daily.     niacin 500 MG Cpsr  Take 250 mg by mouth every evening.        STOP taking these medications    enalapril 10 MG tablet  Commonly known as: VASOTEC     metFORMIN 500 MG tablet  Commonly known as: GLUCOPHAGE            Indwelling Lines/Drains at time of discharge:   Lines/Drains/Airways     None                 Time spent on the discharge of patient: 40 minutes         Reg Chisholm MD  Department of Hospital Medicine  [facility] - [facility] Tele[facility]

## 2023-06-10 NOTE — NURSING
Patients d/c meds sent to wrong pharmacy. Secure chat sent to Dr. Chisholm to resend to pharmacy in [place]. Will finish d/c paperwork when corrected.

## 2023-06-10 NOTE — PLAN OF CARE
Problem: Physical Therapy  Goal: Physical Therapy Goal  Description: Short Term Goals to be met by: 23    Patient will increase functional independence with mobility by performin. Supine to sit with independently  2. Sit to stand transfer with independently using Rolling walker  3. Bed to chair transfer with Stand by assist using Rolling walker  4. Gait  x 100 feet with Stand by assist using Rolling walker  5. Lower extremity exercise program x30 reps per handout, with assistance as needed    Long Term Goals to be met by: 7/10/23    Pt will regain full independent functional mobility with Rolling walker to return to home situation and prior activities of daily living.   Outcome: Ongoing, Progressing

## 2023-06-10 NOTE — PLAN OF CARE
Problem: Occupational Therapy  Goal: Occupational Therapy Goal  Description: STG: (in 1 week)  Pt will perform grooming with setup  Pt will bathe with min(A)  Pt will perform UE dressing with min(A)  Pt will perform LE dressing with SBA  Pt will sit EOB x 7 min with independence  Pt will transfer bed/chair/bsc with mod(I)  Pt will perform standing task x 5 min with supervision  Pt will tolerate 20 minutes of tx without fatigue      LTG: (in 3 weeks)  1.Restore to max I with self care and mobility.     Outcome: Ongoing, Progressing

## 2023-06-10 NOTE — PT/OT/SLP EVAL
Occupational Therapy Evaluation     Name: Darron Goddard  MRN: [id number]  Admitting Diagnosis: LATONIA (acute kidney injury)  Recent Surgery: * No surgery found *      Recommendations:     Discharge Recommendations: home, home with home health  Level of Assistance Recommended: 24 hours light assistance  Discharge Equipment Recommendations:  (none)  Barriers to discharge:      Assessment:     Darron Goddard is a 83 y.o. male with a medical diagnosis of LATONIA (acute kidney injury). He presents with performance deficits affecting function including weakness, impaired endurance, impaired self care skills, impaired functional mobility, gait instability.     Rehab Prognosis: Good; patient would benefit from acute OT services to address these deficits and reach maximum level of function.    Plan:     Patient to be seen   to address the above listed problems via    Plan of Care Expires: 06/10/23  Plan of Care Reviewed with: patient    Subjective     Chief Complaint: weakness  Patient Comments/Goals: Pt plans to return home with spouse today. OT advises pt to ask his doctor about starting up PT home Health to continue to build strength at home.  Pain/Comfort:  Pain Rating 1: 0/10  Pain Rating Post-Intervention 2: 0/10    Patients cultural, spiritual, Jehovah's witness conflicts given the current situation: no    Social History:  Living Environment: Patient lives with their spouse in a single story home with tub-shower combo  Prior Level of Function: Prior to admission, patient  had assistance with his ADLs and ambulation at baseline  Roles and Routines: Patient was not driving and retired prior to admission.  Equipment Used at Home: walker, rolling, shower chair, grab bar, handicapped height toilet  DME owned (not currently used): bedside commode and wheelchair  Assistance Upon Discharge: significant other    Objective:     Communicated with ANGUS Salcedo prior to session. Patient found HOB elevated with peripheral IV, telemetry  upon OT entry to room.    General Precautions: Standard, fall   Orthopedic Precautions: N/A   Braces:      Respiratory Status: Room air    Occupational Performance    Gait belt applied - Yes    Bed Mobility:   Supine to sit from left side of bed with contact guard assistance  Sit to Supine with contact guard assistance and minimum assistance on left side of bed    Functional Mobility/Transfers:  Sit <> Stand Transfer with contact guard assistance with hand-held assist  Functional Mobility: pt ambulated around the room with CGA to min (A) x 2 persons with HHA    Activities of Daily Living:  Upper Body Dressing: minimum assistance  Lower Body Dressing: moderate assistance    Cognitive/Visual Perceptual:  Cognitive/Psychosocial Skills:    -     Oriented to: Person and Place  -     Follows Commands/attention: Follows one-step commands  -     Communication: clear/fluent  -     Safety awareness/insight to disability: intact  -     Mood/Affect/Coping skills/emotional control: Cooperative    Physical Exam:  Balance:    -     Sitting: supervision  -     Standing: contact guard assistance  Sensation:    -       Intact  Dominant hand: Right  Upper Extremity Range of Motion:     -       Right Upper Extremity: WFL  -       Left Upper Extremity: WFL  Upper Extremity Strength:    -       Right Upper Extremity: WFL  -       Left Upper Extremity: WFL   Strength:    -       Right Upper Extremity: WFL  -       Left Upper Extremity: WFL  Gross motor coordination:   WFL    AMPAC 6 Click ADL:  AMPAC Total Score: 21    Treatment & Education:  Therapist provided facilitation and instruction of proper body mechanics, energy conservation, and fall prevention strategies during tasks listed above  Patient educated on role of OT, POC, and goals for therapy  Pt encouraged to ask his doctor about getting home health PT statred    Patient clear to ambulate to/from bathroom with RN/PCT, assist x1 .    Patient left sitting edge of bed with all  lines intact, call button in reach, RN notified, and spouse present.    GOALS:   Multidisciplinary Problems       Occupational Therapy Goals          Problem: Occupational Therapy    Goal Priority Disciplines Outcome Interventions   Occupational Therapy Goal     OT, PT/OT Ongoing, Progressing    Description: STG: (in 1 week)  Pt will perform grooming with setup  Pt will bathe with min(A)  Pt will perform UE dressing with min(A)  Pt will perform LE dressing with SBA  Pt will sit EOB x 7 min with independence  Pt will transfer bed/chair/bsc with mod(I)  Pt will perform standing task x 5 min with supervision  Pt will tolerate 20 minutes of tx without fatigue      LTG: (in 3 weeks)  1.Restore to max I with self care and mobility.                          History:     Past Medical History:   Diagnosis Date    Cancer     prostate diagnosed 2013    Diabetes mellitus     Hypertension          Past Surgical History:   Procedure Laterality Date    RADIOACTIVE SEED IMPLANTATION OF PROSTATE         Time Tracking:     OT Date of Treatment: 06/10/23  OT Start Time: 0907  OT Stop Time: 0928  OT Total Time (min): 21 min    Billable Minutes: Evaluation low complexity    6/10/2023

## 2023-06-10 NOTE — PT/OT/SLP EVAL
Physical Therapy Evaluation     Patient Name: Darron Goddard   MRN: [id number]  Recent Surgery: * No surgery found *      Recommendations:     Discharge Recommendations: home with home health   Discharge Equipment Recommendations: none   Barriers to discharge: Ongoing medical treatment    Assessment:     Darron Goddard is a 83 y.o. male admitted with a medical diagnosis of LATONIA (acute kidney injury). He presents with the following impairments/functional limitations: weakness, impaired endurance, impaired functional mobility, gait instability, impaired balance, decreased lower extremity function.     Rehab Prognosis: Good; patient would benefit from acute PT services to address these deficits and reach maximum level of function.    Plan:     During this hospitalization, patient to be seen 5 x/week to address the above listed problems via gait training, therapeutic activities, therapeutic exercises, neuromuscular re-education    Plan of Care Expires: 07/10/23    Subjective     Chief Complaint: LATONIA  Patient Comments/Goals: agreeable  Pain/Comfort:  Pain Rating 1: 0/10    Social History:  Living Environment: Patient lives with their spouse in a single story home with number of outside stair(s): 1  Prior Level of Function: Prior to admission, patient was modified independent with ADLs using rolling walker and straight cane for mobility  Equipment Used at Home: cane, straight, walker, rolling  DME owned (not currently used):  unknown  Assistance Upon Discharge: family    Objective:     Communicated with OTR prior to session. Patient found right sidelying with HOB elevated with peripheral IV upon PT entry to room.    General Precautions: Standard, fall   Orthopedic Precautions: N/A   Braces: N/A    Respiratory Status: Room air    Exams:  Cognition: Patient is oriented to Person, Place, Time, Situation  RLE ROM: WFL  RLE Strength: Deficits: 3+/5  LLE ROM: WFL  LLE Strength: Deficits: 3+/5  Sensation:    -        Impaired  numbness in bilat feet for approx 1 month    Functional Mobility:  Gait belt applied - Yes  Bed Mobility  Supine to Sit: stand by assistance for trunk management  Transfers  Sit to Stand: contact guard assistance with hand-held assist and with cues for hand placement and foot placement  Gait  Patient ambulated 30ft with hand-held assist and minimum assistance and of 2 persons. Patient demonstrates unsteady gait, decreased step length, wide base of support, decreased weight shift, flexed posture, and decreased carmina. . All lines remained intact throughout ambulation trail.  Balance  Sitting: supervision  Standing: contact guard assistance      Therapeutic Activities and Exercises:   Patient educated on role of acute care PT and PT POC, safety while in hospital including calling nurse for mobility, and call light usage  Patient educated about importance of OOB mobility and remaining up in chair most of the day.      AM-PAC 6 CLICK MOBILITY  Total Score:20    Patient left sitting edge of bed with all lines intact, call button in reach, and RN notified.    GOALS:   Multidisciplinary Problems       Physical Therapy Goals          Problem: Physical Therapy    Goal Priority Disciplines Outcome Goal Variances Interventions   Physical Therapy Goal     PT, PT/OT Ongoing, Progressing     Description: Short Term Goals to be met by: 23    Patient will increase functional independence with mobility by performin. Supine to sit with independently  2. Sit to stand transfer with independently using Rolling walker  3. Bed to chair transfer with Stand by assist using Rolling walker  4. Gait  x 100 feet with Stand by assist using Rolling walker  5. Lower extremity exercise program x30 reps per handout, with assistance as needed    Long Term Goals to be met by: 7/10/23    Pt will regain full independent functional mobility with Rolling walker to return to home situation and prior activities of daily living.                         History:     Past Medical History:   Diagnosis Date    Cancer     prostate diagnosed 2013    Diabetes mellitus     Hypertension        Past Surgical History:   Procedure Laterality Date    RADIOACTIVE SEED IMPLANTATION OF PROSTATE         Time Tracking:     PT Received On: 06/10/23  PT Start Time: 1030  PT Stop Time: 1038  PT Total Time (min): 8 min     Billable Minutes: Evaluation moderate complexity    6/10/2023

## 2023-06-10 NOTE — NURSING
Discharge instructions given to pt and pts wife. Verbalizes understanding. IV removed cath intact. No bleeding noted. Pt left floor via wheelchair per staff.

## 2023-06-12 ENCOUNTER — PATIENT OUTREACH (OUTPATIENT)
Dept: ADMINISTRATIVE | Facility: CLINIC | Age: 83
End: 2023-06-12

## 2023-06-12 NOTE — PROGRESS NOTES
C3 nurse attempted to contact Darron Goddard  for a TCC post hospital discharge follow up call. Spoke with wife, she is unable to conduct the call @ this time. Wife requested a callback. Pt does not have a hospital follow up appointment and does not have a [facility] PCP.

## 2023-06-12 NOTE — PLAN OF CARE
[facility]  Discharge Final Note    Primary Care Provider: Primary Doctor No    Expected Discharge Date: 6/10/2023    Final Discharge Note (most recent)       Final Note - 06/12/23 0848          Final Note    Assessment Type Final Discharge Note     Anticipated Discharge Disposition Home or Self Care        Post-Acute Status    Discharge Delays None known at this time                     Important Message from Medicare             Contact Info       No, Primary Doctor   Relationship: PCP - General        Next Steps: Follow up in 1 week(s)    No, Primary Doctor   Relationship: PCP - General        Next Steps: Follow up

## 2023-06-12 NOTE — PROGRESS NOTES
C3 nurse attempted to contact Darron Goddard  for a TCC post hospital discharge follow up call. No answer. The patient does not have a scheduled HOSFU appointment, and the pt does not have an [facility] PCP.

## 2023-06-13 ENCOUNTER — TELEPHONE (OUTPATIENT)
Dept: GASTROENTEROLOGY | Facility: CLINIC | Age: 83
End: 2023-06-13
Payer: COMMERCIAL

## 2023-06-13 DIAGNOSIS — A04.8 H. PYLORI INFECTION: Primary | ICD-10-CM

## 2023-06-13 DIAGNOSIS — A04.8 H. PYLORI INFECTION: ICD-10-CM

## 2023-06-13 LAB
BACTERIA BLD CULT: NORMAL
BACTERIA BLD CULT: NORMAL
ESTROGEN SERPL-MCNC: NORMAL PG/ML
INSULIN SERPL-ACNC: NORMAL U[IU]/ML
LAB AP GROSS DESCRIPTION: NORMAL
LAB AP LABORATORY NOTES: NORMAL
T3RU NFR SERPL: NORMAL %

## 2023-06-13 RX ORDER — PANTOPRAZOLE SODIUM 40 MG/1
40 TABLET, DELAYED RELEASE ORAL DAILY
Qty: 30 TABLET | Refills: 0 | Status: SHIPPED | OUTPATIENT
Start: 2023-06-13 | End: 2024-06-12

## 2023-06-13 RX ORDER — AMOXICILLIN 500 MG/1
1000 TABLET, FILM COATED ORAL EVERY 12 HOURS
Qty: 40 TABLET | Refills: 0 | Status: SHIPPED | OUTPATIENT
Start: 2023-06-13 | End: 2023-06-23

## 2023-06-13 RX ORDER — PANTOPRAZOLE SODIUM 40 MG/1
40 TABLET, DELAYED RELEASE ORAL DAILY
Qty: 30 TABLET | Refills: 0 | Status: SHIPPED | OUTPATIENT
Start: 2023-06-13 | End: 2023-06-13 | Stop reason: SDUPTHER

## 2023-06-13 RX ORDER — AMOXICILLIN 500 MG/1
1000 TABLET, FILM COATED ORAL EVERY 12 HOURS
Qty: 40 TABLET | Refills: 0 | Status: SHIPPED | OUTPATIENT
Start: 2023-06-13 | End: 2023-06-13 | Stop reason: SDUPTHER

## 2023-06-13 RX ORDER — TETRACYCLINE HYDROCHLORIDE 500 MG/1
500 CAPSULE ORAL EVERY 6 HOURS
Qty: 40 CAPSULE | Refills: 0 | Status: SHIPPED | OUTPATIENT
Start: 2023-06-13 | End: 2023-06-23

## 2023-06-13 RX ORDER — TETRACYCLINE HYDROCHLORIDE 500 MG/1
500 CAPSULE ORAL EVERY 6 HOURS
Qty: 40 CAPSULE | Refills: 0 | Status: SHIPPED | OUTPATIENT
Start: 2023-06-13 | End: 2023-06-13 | Stop reason: SDUPTHER

## 2023-06-13 NOTE — TELEPHONE ENCOUNTER
Results and recommendations called to patients wife. Instructions given on H pylori treatment regimen.  States they use [facility] in [place]. Will send new rx request to [facility].              ----- Message from Mike Bernard MD sent at 6/13/2023  3:48 PM CDT -----  EGD bx confirms ulcerated mucosa of the distal esophagus and gastritis with H.pylori infection.   I sent Rx for protonix, amoxil and tetracycline to [facility].  Instruct pt to add otc pepto-bismol two tabs qid x 10 days while on antibiotics.  I did order stool H.pylori in 12 weeks.

## 2023-06-13 NOTE — TELEPHONE ENCOUNTER
Attempted to call results. Left message.            ----- Message from Mike Bernard MD sent at 6/13/2023  3:48 PM CDT -----  EGD bx confirms ulcerated mucosa of the distal esophagus and gastritis with H.pylori infection.   I sent Rx for protonix, amoxil and tetracycline to [facility].  Instruct pt to add otc pepto-bismol two tabs qid x 10 days while on antibiotics.  I did order stool H.pylori in 12 weeks.

## 2023-06-13 NOTE — PROGRESS NOTES
EGD bx confirms ulcerated mucosa of the distal esophagus and gastritis with H.pylori infection.   I sent Rx for protonix, amoxil and tetracycline to [facility].  Instruct pt to add otc pepto-bismol two tabs qid x 10 days while on antibiotics.  I did order stool H.pylori in 12 weeks.

## 2023-08-14 DIAGNOSIS — A04.8 H. PYLORI INFECTION: Primary | ICD-10-CM

## 2023-09-11 PROBLEM — K25.3 ACUTE GASTRIC ULCER WITHOUT HEMORRHAGE OR PERFORATION: Status: RESOLVED | Noted: 2023-06-09 | Resolved: 2023-09-11

## 2023-09-11 PROBLEM — N17.9 AKI (ACUTE KIDNEY INJURY): Status: RESOLVED | Noted: 2023-06-07 | Resolved: 2023-09-11

## 2024-12-19 NOTE — PROGRESS NOTES
C3 nurse spoke with wife Nevin Goddard for a TCC post hospital discharge follow up call. Wife reports pt has a scheduled HOSFU appointment with non [facility] PCP JAUN Denise in 2 weeks. Patient advised to contact their PCP to schedule a HOSPFU within 5-7 days.          The patient is requesting order for mammogram.  An order was placed for mammogram and bone density December 17, 2024 after her visit.

## 2025-01-30 NOTE — PROGRESS NOTES
[facility] - [address] [place] [address] Medicine  Progress Note    Patient Name: Darron Goddard  MRN: [id number]  Patient Class: IP- Inpatient   Admission Date: 6/7/2023  Length of Stay: 2 days  Attending Physician: Reg Chisholm MD  Primary Care Provider: Primary Doctor No        Subjective:     Principal Problem:LATONIA (acute kidney injury)        HPI:  82 yo M presents to the ED for constipation.  He states he has not had a BM in nearly two weels.  He denies abdominal pain.  He says he just doesn't feel well.  His wife says he has been fatigued with decreased appetite for the past two weeks and she says he was too week today to get out of bed.  He looks much better now that he has gotten some IVF.  He did have an abdominal CT wo contrast which did not show an obstruction of impaction nor hydronephrosis but renal lesions noted.  Renal US ordered to help differentiate solid vs cystic lesions.      Patient states he was told ten years ago at [facility] by Dr. Ramirez that he had renal cancer and a nephrectomy was recommended.  He left and never went back.  He found a new PCP at the health department in [place] that would fill his prescriptions and he has not been back to a doctor since that time.  He did have radioactive seed implants for prostate cancer placed at [facility] 20 years ago and says he has been fine since that time and says at his age he did not want aggressive measures anyway.  Today he has hematuria and possible UTI.  Lactic acid 1.7.  Was given zosyn in the ED and cultured.      Patient has marked prerenal azotemia hyperkalemia and metabolic acidosis.  Will order urine na and urine creat to calculate FeNa.      ROS below but also states his decreased oral intake is due to dysphagia.  He says for the past few weeks he has even had trouble swallowing water and that he has not taken his meds in nearly two weeks.  He feels they get stuck in his throat.  Important  to mention is he has been a smoker for 65 years but quit recently.        Overview/Hospital Course:  06/08/2023   Patient has no new complaints, abdominal pain is improving.    Still feels weak all over.    Patient was told to have renal cell carcinoma and needed nephrectomy 10 years ago, patient declined at that time.    Patient has history of prostate cancer status post radioactive seeds implants 20 years ago.  PTT   Vital signs are stable, he is afebrile.    Kidney ultrasound showed solid mass located within the inferior pole of the left kidney measuring 4.3 cm.    Urine culture Gram-negative bacilli, blood cultures negative to date.    White count 9 hemoglobin 10 platelets 183 INR 1.2 sodium 147 bicarb 17 up from 10.    Creatinine trending down 3.3 today.    Glucose 180.    HDL 27 triglyceride 189 LDL 12 PTT   Lactic acid 1.8.    Urine sodium 13 and urine creatinine 111.     Patient acute on chronic renal failure likely related to intravascular volume depletion/prerenal, fraction excretion of sodium is 0.3% .  We will continue IV fluids hydration.  Avoid nephrotoxic drugs   Monitor input output charting closely.  Metabolic acidosis with normal lactic acidosis likely related to renal failure, no evidence of sepsis, continue bicarb infusion continue to monitor closely, clinically improving.  We will continue Rocephin and we will follow urine culture results.    We will consult PT/OT.  Left renal cell carcinoma, declined surgery in the past, consulted Urology for their input.    Patient code status DNR   Continue same current management     06/09/2023   Patient was seen earlier this morning, he was feeling fine, no chest pain shortness a breath and he is requesting to go home.    Blood pressure 120/80 heart rate heart rate 66 afebrile.    White count down to 8 hemoglobin 9 platelets count was having a to the OR by capacity 147 with an 1152 with iron saturation 55.    Creatinine down to 2.1 with glucose of 125.     Urine culture grew Klebsiella pneumoniae sensitive to ceftriaxone.     patient was seen and evaluated by Dr. Underwood, recommended medical management with oral antibiotics, no over invasive measures..    Will do swallowing eval.    One continue with dietitian recommendations.    Shortly after he was seen patient went into SVT, heart rate of 160, stable with blood pressure 130/86, received 2 doses of adenosine with no change in SVT, given Lopressor 5 mg IV push and his heart rate down to 70.  Patient was on beta blockers at home.    I will start him on Lopressor 50 p.o. b.i.d. and I will give him a bolus of fluids.    Continue close monitoring.    Echo ordered   Renal function improving.  Discussed with his wife at bedside.      Past Medical History:   Diagnosis Date    Cancer     prostate diagnosed 2013    Diabetes mellitus     Hypertension        Past Surgical History:   Procedure Laterality Date    RADIOACTIVE SEED IMPLANTATION OF PROSTATE         Review of patient's allergies indicates:  No Known Allergies    No current facility-administered medications on file prior to encounter.     Current Outpatient Medications on File Prior to Encounter   Medication Sig    albuterol sulfate (PROAIR RESPICLICK) 90 mcg/actuation inhaler Inhale 2 puffs into the lungs every 6 (six) hours as needed for Wheezing. Rescue    amLODIPine (NORVASC) 10 MG tablet Take 10 mg by mouth once daily.    aspirin (ECOTRIN) 81 MG EC tablet Take 81 mg by mouth once daily.    atenoloL (TENORMIN) 50 MG tablet Take 50 mg by mouth once daily.    atorvastatin (LIPITOR) 40 MG tablet Take 40 mg by mouth once daily.    enalapril (VASOTEC) 10 MG tablet Take 10 mg by mouth once daily.    ezetimibe (ZETIA) 10 mg tablet Take 10 mg by mouth once daily.    metFORMIN (GLUCOPHAGE) 500 MG tablet Take 500 mg by mouth 2 (two) times daily with meals.    niacin 500 MG CpSR Take 250 mg by mouth every evening.     Family History    None       Tobacco Use     Smoking status: Former     Types: Cigarettes    Smokeless tobacco: Never   Substance and Sexual Activity    Alcohol use: Never    Drug use: Never    Sexual activity: Not Currently     Review of Systems   Constitutional:  Positive for appetite change and fatigue. Negative for chills, fever and unexpected weight change.   HENT:  Positive for trouble swallowing. Negative for congestion, mouth sores, nosebleeds, sinus pain and sore throat.    Eyes:  Positive for visual disturbance.   Respiratory:  Negative for apnea, cough, chest tightness and shortness of breath.    Cardiovascular:  Negative for chest pain, palpitations and leg swelling.   Gastrointestinal:  Negative for abdominal pain, blood in stool, constipation, diarrhea, nausea and vomiting.   Endocrine: Negative for polydipsia and polyuria.   Genitourinary:  Negative for decreased urine volume, difficulty urinating, dysuria, flank pain and frequency.   Musculoskeletal:  Negative for arthralgias, back pain and neck pain.   Skin:  Negative for rash.   Neurological:  Negative for tremors, syncope, light-headedness and headaches.   Hematological:  Does not bruise/bleed easily.   Psychiatric/Behavioral:  Negative for confusion and suicidal ideas.    Objective:     Vital Signs (Most Recent):  Temp: 97.9 °F (36.6 °C) (06/09/23 0705)  Pulse: 66 (06/09/23 0705)  Resp: 20 (06/09/23 0705)  BP: 127/78 (06/09/23 0705)  SpO2: 99 % (06/09/23 0705) Vital Signs (24h Range):  Temp:  [97.9 °F (36.6 °C)-98.3 °F (36.8 °C)] 97.9 °F (36.6 °C)  Pulse:  [56-87] 66  Resp:  [20] 20  SpO2:  [93 %-99 %] 99 %  BP: (126-148)/(44-78) 127/78     Weight: 78.3 kg (172 lb 9.9 oz)  Body mass index is 24.08 kg/m².     Physical Exam  Vitals and nursing note reviewed. Exam conducted with a chaperone present.   Constitutional:       Appearance: Normal appearance.   HENT:      Head: Atraumatic.      Mouth/Throat:      Mouth: Mucous membranes are moist.      Pharynx: Oropharynx is clear.   Eyes:       Conjunctiva/sclera: Conjunctivae normal.      Pupils: Pupils are equal, round, and reactive to light.      Comments: Legally Blind   Neck:      Vascular: No carotid bruit.   Cardiovascular:      Rate and Rhythm: Regular rhythm. Tachycardia present.      Pulses: Normal pulses.      Heart sounds: Normal heart sounds.   Pulmonary:      Effort: Pulmonary effort is normal.      Breath sounds: Normal breath sounds.   Abdominal:      General: Abdomen is flat. Bowel sounds are normal. There is no distension.      Palpations: Abdomen is soft.      Tenderness: There is no abdominal tenderness. There is no guarding or rebound.   Musculoskeletal:         General: No tenderness, deformity or signs of injury. Normal range of motion.      Cervical back: Neck supple.      Right lower leg: No edema.      Left lower leg: No edema.   Skin:     General: Skin is warm and dry.      Capillary Refill: Capillary refill takes less than 2 seconds.      Coloration: Skin is not jaundiced or pale.      Findings: No bruising, lesion or rash.   Neurological:      General: No focal deficit present.      Mental Status: He is alert and oriented to person, place, and time.   Psychiatric:         Mood and Affect: Mood normal.            CRANIAL NERVES     CN III, IV, VI   Pupils are equal, round, and reactive to light.     Significant Labs: All pertinent labs within the past 24 hours have been reviewed.    Significant Imaging: I have reviewed all pertinent imaging results/findings within the past 24 hours.      Assessment/Plan:      * LATONIA (acute kidney injury)  Patient with acute kidney injury likely due to pre-renal azotemia LATONIA is currently stable. Labs reviewed- Renal function/electrolytes with CrCl cannot be calculated (Unknown ideal weight.). according to latest data. Monitor urine output and serial BMP and adjust therapy as needed. Avoid nephrotoxins and renally dose meds for GFR listed above.     Causing metabolic acidosis and hyperkalemia which  are being treated.    CT did not show hydronephrosis but did have renal lesions and could not r/o solid mass  Renal US pending.      Hyperlipidemia  Will check lipid in AM  Holding zetia and statin at this time  Will check CK in am      Acute cystitis with hematuria  Start IV rocephin and follow culture.    Renal US pending.    Renal cell carcinoma suspected.        Cancer  Treated at [facility] 20 years ago.   Has not had to follow up in ten years.        Diabetes mellitus  Patient's FSGs are uncontrolled due to hyperglycemia on current medication regimen.  Last A1c reviewed- No results found for: LABA1C, HGBA1C  Most recent fingerstick glucose reviewed- Recent Labs   Lab 06/07/23  2246   POCTGLUCOSE 237*     Current correctional scale  Medium  Increase anti-hyperglycemic dose as follows-   Antihyperglycemics (From admission, onward)    None        Hold Oral hypoglycemics while patient is in the hospital.    Hypertension  Continue norvasc   Hold BB HR 62      Metabolic acidosis  Bicarb infusion.    Stop metformin  Lactic acid 1.8        Hyperkalemia  Augustin's cocktail given in ED along with calcium gluconate  EKG without significant abnormalities  Lokelma 10 mg loading dose  Recheck K in am and may need continue dosing.    Monitor on telemetry.          VTE Risk Mitigation (From admission, onward)         Ordered     Place CARMENZA hose  Until discontinued         06/08/23 0597                Discharge Planning   PASQUALE:      Code Status: DNR   Is the patient medically ready for discharge?:     Reason for patient still in hospital (select all that apply): Treatment  Discharge Plan A: Home with family                  Reg Chisholm MD  Department of Hospital Medicine   [facility] - [address] [place] [address]   30-Jan-2025 13:24

## 2025-05-01 ENCOUNTER — HOSPITAL ENCOUNTER (EMERGENCY)
Facility: HOSPITAL | Age: 85
Discharge: ANOTHER HOSPITAL | End: 2025-05-01
Payer: COMMERCIAL

## 2025-05-01 ENCOUNTER — HOSPITAL ENCOUNTER (INPATIENT)
Facility: HOSPITAL | Age: 85
LOS: 5 days | Discharge: HOME | DRG: 871 | End: 2025-05-06
Attending: STUDENT IN AN ORGANIZED HEALTH CARE EDUCATION/TRAINING PROGRAM | Admitting: STUDENT IN AN ORGANIZED HEALTH CARE EDUCATION/TRAINING PROGRAM
Payer: COMMERCIAL

## 2025-05-01 VITALS
WEIGHT: 216 LBS | DIASTOLIC BLOOD PRESSURE: 46 MMHG | OXYGEN SATURATION: 92 % | HEART RATE: 53 BPM | RESPIRATION RATE: 34 BRPM | TEMPERATURE: 100 F | HEIGHT: 72 IN | SYSTOLIC BLOOD PRESSURE: 129 MMHG | BODY MASS INDEX: 29.26 KG/M2

## 2025-05-01 DIAGNOSIS — E87.5 HYPERKALEMIA: ICD-10-CM

## 2025-05-01 DIAGNOSIS — E87.5 HYPERKALEMIA: Primary | ICD-10-CM

## 2025-05-01 DIAGNOSIS — N17.9 ACUTE RENAL FAILURE, UNSPECIFIED ACUTE RENAL FAILURE TYPE: ICD-10-CM

## 2025-05-01 DIAGNOSIS — R57.9 SHOCK: ICD-10-CM

## 2025-05-01 DIAGNOSIS — R00.1 SYMPTOMATIC BRADYCARDIA: Primary | ICD-10-CM

## 2025-05-01 DIAGNOSIS — E87.20 LACTIC ACIDOSIS: ICD-10-CM

## 2025-05-01 DIAGNOSIS — E83.41 HYPERMAGNESEMIA: ICD-10-CM

## 2025-05-01 PROBLEM — R79.89 ELEVATED TROPONIN: Status: ACTIVE | Noted: 2025-05-01

## 2025-05-01 PROBLEM — R65.20 SEVERE SEPSIS: Status: ACTIVE | Noted: 2025-05-01

## 2025-05-01 PROBLEM — R41.0 DELIRIUM: Status: ACTIVE | Noted: 2025-05-01

## 2025-05-01 PROBLEM — A41.9 SEVERE SEPSIS: Status: ACTIVE | Noted: 2025-05-01

## 2025-05-01 LAB
ALBUMIN SERPL BCP-MCNC: 3.9 G/DL (ref 3.4–4.8)
ALBUMIN/GLOB SERPL: 1 {RATIO}
ALP SERPL-CCNC: 113 U/L (ref 40–150)
ALT SERPL W P-5'-P-CCNC: 29 U/L
ANION GAP SERPL CALCULATED.3IONS-SCNC: 20 MMOL/L (ref 7–16)
ANION GAP SERPL CALCULATED.3IONS-SCNC: 21 MMOL/L (ref 7–16)
ANION GAP SERPL CALCULATED.3IONS-SCNC: 24 MMOL/L (ref 7–16)
AST SERPL W P-5'-P-CCNC: 72 U/L (ref 11–45)
BACTERIA #/AREA URNS HPF: ABNORMAL /HPF
BASOPHILS # BLD AUTO: 0.03 K/UL (ref 0–0.2)
BASOPHILS NFR BLD AUTO: 0.1 % (ref 0–1)
BILIRUB SERPL-MCNC: 0.6 MG/DL
BILIRUB UR QL STRIP: NEGATIVE
BUN SERPL-MCNC: 74 MG/DL (ref 8–26)
BUN SERPL-MCNC: 76 MG/DL (ref 8–26)
BUN SERPL-MCNC: 80 MG/DL (ref 8–26)
BUN/CREAT SERPL: 13 (ref 6–20)
BUN/CREAT SERPL: 16 (ref 6–20)
BUN/CREAT SERPL: 16 (ref 6–20)
CALCIUM SERPL-MCNC: 8.5 MG/DL (ref 8.8–10)
CALCIUM SERPL-MCNC: 9 MG/DL (ref 8.8–10)
CALCIUM SERPL-MCNC: 9.3 MG/DL (ref 8.8–10)
CHLORIDE SERPL-SCNC: 107 MMOL/L (ref 98–107)
CHLORIDE SERPL-SCNC: 109 MMOL/L (ref 98–107)
CHLORIDE SERPL-SCNC: 110 MMOL/L (ref 98–107)
CLARITY UR: CLEAR
CO2 SERPL-SCNC: 13 MMOL/L (ref 23–31)
CO2 SERPL-SCNC: 16 MMOL/L (ref 23–31)
CO2 SERPL-SCNC: 17 MMOL/L (ref 23–31)
COLOR UR: YELLOW
CREAT SERPL-MCNC: 4.66 MG/DL (ref 0.72–1.25)
CREAT SERPL-MCNC: 5.16 MG/DL (ref 0.72–1.25)
CREAT SERPL-MCNC: 5.49 MG/DL (ref 0.72–1.25)
DIFFERENTIAL METHOD BLD: ABNORMAL
EGFR (NO RACE VARIABLE) (RUSH/TITUS): 10 ML/MIN/1.73M2
EGFR (NO RACE VARIABLE) (RUSH/TITUS): 10 ML/MIN/1.73M2
EGFR (NO RACE VARIABLE) (RUSH/TITUS): 12 ML/MIN/1.73M2
EOSINOPHIL # BLD AUTO: 0 K/UL (ref 0–0.5)
EOSINOPHIL NFR BLD AUTO: 0 % (ref 1–4)
ERYTHROCYTE [DISTWIDTH] IN BLOOD BY AUTOMATED COUNT: 14 % (ref 11.5–14.5)
EST. AVERAGE GLUCOSE BLD GHB EST-MCNC: 120 MG/DL
GLOBULIN SER-MCNC: 4 G/DL (ref 2–4)
GLUCOSE SERPL-MCNC: 131 MG/DL (ref 70–105)
GLUCOSE SERPL-MCNC: 138 MG/DL (ref 82–115)
GLUCOSE SERPL-MCNC: 151 MG/DL (ref 70–105)
GLUCOSE SERPL-MCNC: 153 MG/DL (ref 70–105)
GLUCOSE SERPL-MCNC: 161 MG/DL (ref 70–105)
GLUCOSE SERPL-MCNC: 161 MG/DL (ref 82–115)
GLUCOSE SERPL-MCNC: 166 MG/DL (ref 82–115)
GLUCOSE UR STRIP-MCNC: 100 MG/DL
HBA1C MFR BLD HPLC: 5.8 %
HCO3 UR-SCNC: 20.5 MMOL/L (ref 21–28)
HCT VFR BLD AUTO: 37.4 % (ref 40–54)
HGB BLD-MCNC: 11.9 G/DL (ref 13.5–18)
KETONES UR STRIP-SCNC: NEGATIVE MG/DL
LACTATE SERPL-SCNC: 3.5 MMOL/L (ref 0.5–2.2)
LACTATE SERPL-SCNC: 3.9 MMOL/L (ref 0.5–2.2)
LACTATE SERPL-SCNC: 4.5 MMOL/L (ref 0.5–2.2)
LACTATE SERPL-SCNC: 5 MMOL/L (ref 0.5–2.2)
LACTATE SERPL-SCNC: 7.2 MMOL/L (ref 0.5–2.2)
LEUKOCYTE ESTERASE UR QL STRIP: NEGATIVE
LYMPHOCYTES # BLD AUTO: 2.39 K/UL (ref 1–4.8)
LYMPHOCYTES NFR BLD AUTO: 10.5 % (ref 27–41)
LYMPHOCYTES NFR BLD MANUAL: 19 % (ref 27–41)
MAGNESIUM SERPL-MCNC: 3.3 MG/DL (ref 1.6–2.6)
MCH RBC QN AUTO: 29 PG (ref 27–31)
MCHC RBC AUTO-ENTMCNC: 31.8 G/DL (ref 32–36)
MCV RBC AUTO: 91.2 FL (ref 80–96)
MONOCYTES # BLD AUTO: 1.52 K/UL (ref 0–0.8)
MONOCYTES NFR BLD AUTO: 6.6 % (ref 2–6)
MONOCYTES NFR BLD MANUAL: 2 % (ref 2–6)
MPC BLD CALC-MCNC: 9.7 FL (ref 9.4–12.4)
NEUTROPHILS # BLD AUTO: 18.92 K/UL (ref 1.8–7.7)
NEUTROPHILS NFR BLD AUTO: 82.8 % (ref 53–65)
NEUTS SEG NFR BLD MANUAL: 79 % (ref 50–62)
NITRITE UR QL STRIP: NEGATIVE
NRBC BLD MANUAL-RTO: ABNORMAL %
PCO2 BLDA: 38 MMHG (ref 35–48)
PH SMN: 7.34 [PH] (ref 7.35–7.45)
PH UR STRIP: 5 PH UNITS
PHOSPHATE SERPL-MCNC: 6.8 MG/DL (ref 2.3–4.7)
PLATELET # BLD AUTO: 349 K/UL (ref 150–400)
PLATELET MORPHOLOGY: NORMAL
PO2 BLDA: 82 MMHG (ref 83–108)
POC BASE EXCESS: -4.8 MMOL/L (ref -2–3)
POC SATURATED O2: 95 % (ref 95–98)
POTASSIUM SERPL-SCNC: 5.4 MMOL/L (ref 3.5–5.1)
POTASSIUM SERPL-SCNC: 6.1 MMOL/L (ref 3.5–5.1)
POTASSIUM SERPL-SCNC: 6.4 MMOL/L (ref 3.5–5.1)
POTASSIUM SERPL-SCNC: 7.2 MMOL/L (ref 3.5–5.1)
PROT SERPL-MCNC: 7.9 G/DL (ref 5.8–7.6)
PROT UR QL STRIP: >=300
RBC # BLD AUTO: 4.1 M/UL (ref 4.6–6.2)
RBC # UR STRIP: ABNORMAL /UL
RBC #/AREA URNS HPF: ABNORMAL /HPF
RBC MORPH BLD: NORMAL
SODIUM SERPL-SCNC: 136 MMOL/L (ref 136–145)
SODIUM SERPL-SCNC: 141 MMOL/L (ref 136–145)
SODIUM SERPL-SCNC: 142 MMOL/L (ref 136–145)
SP GR UR STRIP: 1.02
SQUAMOUS #/AREA URNS LPF: ABNORMAL /LPF
TROPONIN I SERPL HS-MCNC: 48.7 NG/L
TROPONIN I SERPL HS-MCNC: 65.6 NG/L
UROBILINOGEN UR STRIP-ACNC: 0.2 MG/DL
WBC # BLD AUTO: 22.86 K/UL (ref 4.5–11)
WBC #/AREA URNS HPF: ABNORMAL /HPF

## 2025-05-01 PROCEDURE — 83036 HEMOGLOBIN GLYCOSYLATED A1C: CPT | Performed by: STUDENT IN AN ORGANIZED HEALTH CARE EDUCATION/TRAINING PROGRAM

## 2025-05-01 PROCEDURE — 84100 ASSAY OF PHOSPHORUS: CPT | Performed by: NURSE PRACTITIONER

## 2025-05-01 PROCEDURE — 84484 ASSAY OF TROPONIN QUANT: CPT | Performed by: NURSE PRACTITIONER

## 2025-05-01 PROCEDURE — 25000003 PHARM REV CODE 250: Performed by: NURSE PRACTITIONER

## 2025-05-01 PROCEDURE — 20000000 HC ICU ROOM

## 2025-05-01 PROCEDURE — 99285 EMERGENCY DEPT VISIT HI MDM: CPT | Mod: GF,,, | Performed by: NURSE PRACTITIONER

## 2025-05-01 PROCEDURE — 82962 GLUCOSE BLOOD TEST: CPT

## 2025-05-01 PROCEDURE — 81001 URINALYSIS AUTO W/SCOPE: CPT | Performed by: NURSE PRACTITIONER

## 2025-05-01 PROCEDURE — 36415 COLL VENOUS BLD VENIPUNCTURE: CPT | Performed by: STUDENT IN AN ORGANIZED HEALTH CARE EDUCATION/TRAINING PROGRAM

## 2025-05-01 PROCEDURE — 96375 TX/PRO/DX INJ NEW DRUG ADDON: CPT

## 2025-05-01 PROCEDURE — 25000003 PHARM REV CODE 250: Performed by: HOSPITALIST

## 2025-05-01 PROCEDURE — 80053 COMPREHEN METABOLIC PANEL: CPT | Performed by: NURSE PRACTITIONER

## 2025-05-01 PROCEDURE — 99900035 HC TECH TIME PER 15 MIN (STAT)

## 2025-05-01 PROCEDURE — 96367 TX/PROPH/DG ADDL SEQ IV INF: CPT

## 2025-05-01 PROCEDURE — 93005 ELECTROCARDIOGRAM TRACING: CPT

## 2025-05-01 PROCEDURE — 87040 BLOOD CULTURE FOR BACTERIA: CPT | Performed by: NURSE PRACTITIONER

## 2025-05-01 PROCEDURE — 25000003 PHARM REV CODE 250: Performed by: STUDENT IN AN ORGANIZED HEALTH CARE EDUCATION/TRAINING PROGRAM

## 2025-05-01 PROCEDURE — 63600175 PHARM REV CODE 636 W HCPCS

## 2025-05-01 PROCEDURE — 83605 ASSAY OF LACTIC ACID: CPT | Performed by: NURSE PRACTITIONER

## 2025-05-01 PROCEDURE — 83735 ASSAY OF MAGNESIUM: CPT | Performed by: NURSE PRACTITIONER

## 2025-05-01 PROCEDURE — 94799 UNLISTED PULMONARY SVC/PX: CPT

## 2025-05-01 PROCEDURE — 94761 N-INVAS EAR/PLS OXIMETRY MLT: CPT

## 2025-05-01 PROCEDURE — 96376 TX/PRO/DX INJ SAME DRUG ADON: CPT

## 2025-05-01 PROCEDURE — 94640 AIRWAY INHALATION TREATMENT: CPT

## 2025-05-01 PROCEDURE — 96365 THER/PROPH/DIAG IV INF INIT: CPT

## 2025-05-01 PROCEDURE — 85025 COMPLETE CBC W/AUTO DIFF WBC: CPT | Performed by: NURSE PRACTITIONER

## 2025-05-01 PROCEDURE — 99292 CRITICAL CARE ADDL 30 MIN: CPT | Mod: ,,, | Performed by: STUDENT IN AN ORGANIZED HEALTH CARE EDUCATION/TRAINING PROGRAM

## 2025-05-01 PROCEDURE — 82803 BLOOD GASES ANY COMBINATION: CPT

## 2025-05-01 PROCEDURE — 27000221 HC OXYGEN, UP TO 24 HOURS

## 2025-05-01 PROCEDURE — 63600175 PHARM REV CODE 636 W HCPCS: Performed by: NURSE PRACTITIONER

## 2025-05-01 PROCEDURE — 36600 WITHDRAWAL OF ARTERIAL BLOOD: CPT

## 2025-05-01 PROCEDURE — 63600175 PHARM REV CODE 636 W HCPCS: Performed by: STUDENT IN AN ORGANIZED HEALTH CARE EDUCATION/TRAINING PROGRAM

## 2025-05-01 PROCEDURE — 84132 ASSAY OF SERUM POTASSIUM: CPT | Performed by: NURSE PRACTITIONER

## 2025-05-01 PROCEDURE — 81001 URINALYSIS AUTO W/SCOPE: CPT | Performed by: STUDENT IN AN ORGANIZED HEALTH CARE EDUCATION/TRAINING PROGRAM

## 2025-05-01 PROCEDURE — 99285 EMERGENCY DEPT VISIT HI MDM: CPT | Mod: 25

## 2025-05-01 PROCEDURE — 25000242 PHARM REV CODE 250 ALT 637 W/ HCPCS: Performed by: HOSPITALIST

## 2025-05-01 PROCEDURE — 99291 CRITICAL CARE FIRST HOUR: CPT | Mod: ,,, | Performed by: STUDENT IN AN ORGANIZED HEALTH CARE EDUCATION/TRAINING PROGRAM

## 2025-05-01 PROCEDURE — 63600175 PHARM REV CODE 636 W HCPCS: Performed by: HOSPITALIST

## 2025-05-01 PROCEDURE — 36415 COLL VENOUS BLD VENIPUNCTURE: CPT | Performed by: NURSE PRACTITIONER

## 2025-05-01 PROCEDURE — 84484 ASSAY OF TROPONIN QUANT: CPT | Performed by: STUDENT IN AN ORGANIZED HEALTH CARE EDUCATION/TRAINING PROGRAM

## 2025-05-01 RX ORDER — IBUPROFEN 200 MG
24 TABLET ORAL
Status: DISCONTINUED | OUTPATIENT
Start: 2025-05-01 | End: 2025-05-06 | Stop reason: HOSPADM

## 2025-05-01 RX ORDER — HEPARIN SODIUM 5000 [USP'U]/ML
5000 INJECTION, SOLUTION INTRAVENOUS; SUBCUTANEOUS EVERY 8 HOURS
Status: DISCONTINUED | OUTPATIENT
Start: 2025-05-01 | End: 2025-05-06 | Stop reason: HOSPADM

## 2025-05-01 RX ORDER — LORAZEPAM 2 MG/ML
INJECTION INTRAMUSCULAR
Status: COMPLETED
Start: 2025-05-01 | End: 2025-05-01

## 2025-05-01 RX ORDER — GLUCAGON 1 MG
1 KIT INJECTION
Status: DISCONTINUED | OUTPATIENT
Start: 2025-05-01 | End: 2025-05-06 | Stop reason: HOSPADM

## 2025-05-01 RX ORDER — INSULIN ASPART 100 [IU]/ML
0-10 INJECTION, SOLUTION INTRAVENOUS; SUBCUTANEOUS
Status: DISCONTINUED | OUTPATIENT
Start: 2025-05-01 | End: 2025-05-06 | Stop reason: HOSPADM

## 2025-05-01 RX ORDER — ATROPINE SULFATE 0.1 MG/ML
1 INJECTION INTRAVENOUS
Status: COMPLETED | OUTPATIENT
Start: 2025-05-01 | End: 2025-05-01

## 2025-05-01 RX ORDER — SODIUM CHLORIDE 0.9 % (FLUSH) 0.9 %
10 SYRINGE (ML) INJECTION
Status: DISCONTINUED | OUTPATIENT
Start: 2025-05-01 | End: 2025-05-06 | Stop reason: HOSPADM

## 2025-05-01 RX ORDER — CALCIUM GLUCONATE 20 MG/ML
1 INJECTION, SOLUTION INTRAVENOUS ONCE
Status: COMPLETED | OUTPATIENT
Start: 2025-05-01 | End: 2025-05-01

## 2025-05-01 RX ORDER — IBUPROFEN 200 MG
16 TABLET ORAL
Status: DISCONTINUED | OUTPATIENT
Start: 2025-05-01 | End: 2025-05-06 | Stop reason: HOSPADM

## 2025-05-01 RX ORDER — MUPIROCIN 20 MG/G
OINTMENT TOPICAL 2 TIMES DAILY
Status: COMPLETED | OUTPATIENT
Start: 2025-05-01 | End: 2025-05-06

## 2025-05-01 RX ORDER — CALCIUM GLUCONATE 98 MG/ML
1 INJECTION, SOLUTION INTRAVENOUS
Status: COMPLETED | OUTPATIENT
Start: 2025-05-01 | End: 2025-05-01

## 2025-05-01 RX ORDER — DOPAMINE HYDROCHLORIDE 160 MG/100ML
0-15 INJECTION, SOLUTION INTRAVENOUS CONTINUOUS
Status: DISPENSED | OUTPATIENT
Start: 2025-05-01 | End: 2025-05-02

## 2025-05-01 RX ORDER — ATROPINE SULFATE 0.1 MG/ML
0.5 INJECTION INTRAVENOUS
Status: COMPLETED | OUTPATIENT
Start: 2025-05-01 | End: 2025-05-01

## 2025-05-01 RX ORDER — CEFTRIAXONE 1 G/1
1 INJECTION, POWDER, FOR SOLUTION INTRAMUSCULAR; INTRAVENOUS
Status: COMPLETED | OUTPATIENT
Start: 2025-05-01 | End: 2025-05-01

## 2025-05-01 RX ORDER — LORAZEPAM 2 MG/ML
0.5 INJECTION INTRAMUSCULAR
Status: COMPLETED | OUTPATIENT
Start: 2025-05-01 | End: 2025-05-01

## 2025-05-01 RX ORDER — CALCIUM GLUCONATE 20 MG/ML
1 INJECTION, SOLUTION INTRAVENOUS EVERY 10 MIN PRN
Status: DISCONTINUED | OUTPATIENT
Start: 2025-05-01 | End: 2025-05-06 | Stop reason: HOSPADM

## 2025-05-01 RX ORDER — SODIUM BICARBONATE 1 MEQ/ML
50 SYRINGE (ML) INTRAVENOUS
Status: COMPLETED | OUTPATIENT
Start: 2025-05-01 | End: 2025-05-01

## 2025-05-01 RX ORDER — FUROSEMIDE 10 MG/ML
80 INJECTION INTRAMUSCULAR; INTRAVENOUS ONCE
Status: COMPLETED | OUTPATIENT
Start: 2025-05-01 | End: 2025-05-01

## 2025-05-01 RX ORDER — ALBUTEROL SULFATE 0.83 MG/ML
10 SOLUTION RESPIRATORY (INHALATION) ONCE
Status: COMPLETED | OUTPATIENT
Start: 2025-05-01 | End: 2025-05-01

## 2025-05-01 RX ORDER — CEFEPIME HYDROCHLORIDE 1 G/1
1 INJECTION, POWDER, FOR SOLUTION INTRAMUSCULAR; INTRAVENOUS
Status: COMPLETED | OUTPATIENT
Start: 2025-05-01 | End: 2025-05-06

## 2025-05-01 RX ADMIN — SODIUM BICARBONATE: 84 INJECTION, SOLUTION INTRAVENOUS at 02:05

## 2025-05-01 RX ADMIN — HEPARIN SODIUM 5000 UNITS: 5000 INJECTION, SOLUTION INTRAVENOUS; SUBCUTANEOUS at 09:05

## 2025-05-01 RX ADMIN — HUMAN INSULIN 10 UNITS: 100 INJECTION, SOLUTION SUBCUTANEOUS at 11:05

## 2025-05-01 RX ADMIN — SODIUM CHLORIDE 1750 MG: 9 INJECTION, SOLUTION INTRAVENOUS at 06:05

## 2025-05-01 RX ADMIN — DEXTROSE MONOHYDRATE 50 G: 25 INJECTION, SOLUTION INTRAVENOUS at 07:05

## 2025-05-01 RX ADMIN — CALCIUM GLUCONATE 1 G: 98 INJECTION INTRAVENOUS at 11:05

## 2025-05-01 RX ADMIN — HUMAN INSULIN 9.24 UNITS: 100 INJECTION, SOLUTION SUBCUTANEOUS at 07:05

## 2025-05-01 RX ADMIN — ALBUTEROL SULFATE 10 MG: 2.5 SOLUTION RESPIRATORY (INHALATION) at 07:05

## 2025-05-01 RX ADMIN — ATROPINE SULFATE 0.5 MG: 0.1 INJECTION INTRAVENOUS at 10:05

## 2025-05-01 RX ADMIN — CEFTRIAXONE SODIUM 1 G: 1 INJECTION, POWDER, FOR SOLUTION INTRAMUSCULAR; INTRAVENOUS at 11:05

## 2025-05-01 RX ADMIN — SODIUM CHLORIDE 500 ML: 9 INJECTION, SOLUTION INTRAVENOUS at 11:05

## 2025-05-01 RX ADMIN — CALCIUM GLUCONATE 1 G: 20 INJECTION, SOLUTION INTRAVENOUS at 07:05

## 2025-05-01 RX ADMIN — CEFEPIME 1 G: 1 INJECTION, POWDER, FOR SOLUTION INTRAMUSCULAR; INTRAVENOUS at 05:05

## 2025-05-01 RX ADMIN — SODIUM BICARBONATE: 84 INJECTION, SOLUTION INTRAVENOUS at 03:05

## 2025-05-01 RX ADMIN — MUPIROCIN: 20 OINTMENT TOPICAL at 09:05

## 2025-05-01 RX ADMIN — ATROPINE SULFATE 1 MG: 0.1 INJECTION INTRAVENOUS at 11:05

## 2025-05-01 RX ADMIN — SODIUM BICARBONATE 50 MEQ: 84 INJECTION INTRAVENOUS at 11:05

## 2025-05-01 RX ADMIN — EPINEPHRINE 0.02 MCG/KG/MIN: 1 INJECTION INTRAMUSCULAR; INTRAVENOUS; SUBCUTANEOUS at 11:05

## 2025-05-01 RX ADMIN — ATROPINE SULFATE 1 MG: 0.1 INJECTION INTRAVENOUS at 10:05

## 2025-05-01 RX ADMIN — FUROSEMIDE 80 MG: 10 INJECTION, SOLUTION INTRAVENOUS at 07:05

## 2025-05-01 RX ADMIN — LORAZEPAM 0.5 MG: 2 INJECTION INTRAMUSCULAR; INTRAVENOUS at 01:05

## 2025-05-01 RX ADMIN — LORAZEPAM 0.5 MG: 2 INJECTION, SOLUTION INTRAMUSCULAR; INTRAVENOUS at 02:05

## 2025-05-01 RX ADMIN — SODIUM CHLORIDE, POTASSIUM CHLORIDE, SODIUM LACTATE AND CALCIUM CHLORIDE 500 ML: 600; 310; 30; 20 INJECTION, SOLUTION INTRAVENOUS at 05:05

## 2025-05-01 RX ADMIN — LORAZEPAM 0.5 MG: 2 INJECTION INTRAMUSCULAR at 02:05

## 2025-05-01 RX ADMIN — SODIUM ZIRCONIUM CYCLOSILICATE 10 G: 10 POWDER, FOR SUSPENSION ORAL at 12:05

## 2025-05-01 RX ADMIN — DEXTROSE MONOHYDRATE 25 G: 25 INJECTION, SOLUTION INTRAVENOUS at 11:05

## 2025-05-01 RX ADMIN — LORAZEPAM 0.5 MG: 2 INJECTION INTRAMUSCULAR at 01:05

## 2025-05-01 NOTE — HPI
This is an 85-year-old gentleman with past medical history significant for renal lesions (in 2023 the patient had CT abdomen pelvis performed which showed bilateral renal cysts versus mass).  In June of 2023 he had ultrasound kidney as well that showed a solid mass with recommendations for CT or MRI with renal mass protocol in the absence of hydronephrosis.     History was obtained from chart review and speaking with the patient's wife.  In the past, the patient has also reportedly been told that he may have renal malignancy and a nephrectomy was recommended however the patient did not want to get a nephrectomy performed .  He does have a history of prostate cancer in the remote past.    The patient has had blood cultures x2 done at the referring facility.    Per the patient's wife, he has been confused and not eating/drinking over the last 2-3 days.  Today, the patient had a syncopal event for a few sec and has after that the patient's wife drove him to the hospital.  He presented to the hospital at which time he was found to be markedly bradycardic.  He was treated with membrane stabilization medication, found to have hyperkalemia and a decision was made to transfer the patient over to the ICU for potential hemodialysis.  On repeat check, the patient's potassium had improved to 6.1.  He has yet another repeat check pending.  He has evidence of marked leukocytosis, hemoglobin at 11.9 and elevated troponin 65.6 without evidence of chest pain.      The patient arrived to [facility] delirious (received lorazepam on route).  He was hemodynamically stable, continued on epinephrine drip.  I had a very detailed conversation with the patient's wife at bedside.  She shared with me that her  has stated multiple times that he would like to be DNR/DNI and does not want any aggressive measures.  I offered hemodialysis, especially if medical management did not work.  She tells me that based on her 's wishes as well as  her decision as the patient's healthcare proxy, they do not wish to proceed with hemodialysis.  They are aware of the potential implications of not getting hemodialysis done especially in the setting of worsening hyperkalemia which may include fatal arrhythmia, cardiac arrest and/or death.

## 2025-05-01 NOTE — ED NOTES
Pts pulse 33, epi drip now at 0.04 mcg/kg/min, pt has continous cough and keeps pulling oxygen off

## 2025-05-01 NOTE — ASSESSMENT & PLAN NOTE
Patient with evidence of shock, has been initiated on epinephrine.  Likely in the setting of acute renal failure and bradycardia causing hemodynamic instability.  Has been able to wean down epinephrine with plans to discontinue epinephrine altogether and initiate patient on dopamine if needed for bradycardia or norepinephrine if needed for hypotension.      Severe lactic acidosis.  Likely in the setting of hypoperfusion versus distributive shock.  We will treat with broad-spectrum antibiotics, IV fluids and monitor lactic acidosis.

## 2025-05-01 NOTE — ASSESSMENT & PLAN NOTE
Patient with what appears to be severe sepsis in the setting of leukocytosis, elevated lactate causing distributive shock.  We have obtained blood cultures, will volume resuscitate with  additional IV fluid boluses (judicious use of IV fluid boluses in the setting of renal failure).  Initiated on broad spectrum antibiotics.  Pending urinalysis.

## 2025-05-01 NOTE — SUBJECTIVE & OBJECTIVE
Past Medical History:   Diagnosis Date    Cancer     prostate diagnosed 2013    Diabetes mellitus     H. pylori infection 6/13/2023    Hypertension        Past Surgical History:   Procedure Laterality Date    RADIOACTIVE SEED IMPLANTATION OF PROSTATE         Review of patient's allergies indicates:  No Known Allergies    Family History    None       Tobacco Use    Smoking status: Former     Types: Cigarettes    Smokeless tobacco: Never   Substance and Sexual Activity    Alcohol use: Never    Drug use: Never    Sexual activity: Not Currently      Review of Systems  Objective:     Vital Signs (Most Recent):  Temp: 98 °F (36.7 °C) (05/01/25 1545)  Pulse: 65 (05/01/25 1630)  Resp: (!) 21 (05/01/25 1630)  BP: (!) 142/47 (05/01/25 1630)  SpO2: 95 % (05/01/25 1630) Vital Signs (24h Range):  Temp:  [98 °F (36.7 °C)-99.6 °F (37.6 °C)] 98 °F (36.7 °C)  Pulse:  [37-82] 65  Resp:  [9-39] 21  SpO2:  [90 %-97 %] 95 %  BP: (129-172)/(42-67) 142/47   Weight: 92.4 kg (203 lb 11.3 oz)  Body mass index is 27.63 kg/m².      Intake/Output Summary (Last 24 hours) at 5/1/2025 1648  Last data filed at 5/1/2025 1545  Gross per 24 hour   Intake --   Output 200 ml   Net -200 ml          Physical Exam  Constitutional:       General: He is in acute distress.      Appearance: He is normal weight. He is ill-appearing. He is not diaphoretic.   HENT:      Head: Normocephalic and atraumatic.      Nose: No congestion or rhinorrhea.      Mouth/Throat:      Mouth: Mucous membranes are moist.   Cardiovascular:      Rate and Rhythm: Normal rate and regular rhythm.      Pulses: Normal pulses.      Heart sounds: Normal heart sounds.   Pulmonary:      Effort: Pulmonary effort is normal. No respiratory distress.      Breath sounds: Normal breath sounds. No stridor. No wheezing, rhonchi or rales.   Chest:      Chest wall: No tenderness.   Abdominal:      General: Abdomen is flat.   Musculoskeletal:      Cervical back: Normal range of motion. No rigidity.       Right lower leg: No edema.      Left lower leg: No edema.   Skin:     General: Skin is warm.      Findings: No erythema.   Neurological:      General: No focal deficit present.      Mental Status: He is disoriented.      Comments: Patient disoriented, delirious.  Moving all 4 extremities equally with power 5/5.  No focal neurological deficits noted.            Vents:  Oxygen Concentration (%): 32 (05/01/25 1619)  Lines/Drains/Airways       Drain  Duration                  Urethral Catheter 05/01/25 1545 <1 day              Peripheral Intravenous Line  Duration                  Peripheral IV - Single Lumen 05/01/25 1545 20 G Right Antecubital <1 day         Peripheral IV - Single Lumen 05/01/25 1545 22 G Posterior;Right Hand <1 day                  Significant Labs:    CBC/Anemia Profile:  Recent Labs   Lab 05/01/25  1059   WBC 22.86*   HGB 11.9*   HCT 37.4*      MCV 91.2   RDW 14.0        Chemistries:  Recent Labs   Lab 05/01/25  1040 05/01/25  1059 05/01/25  1320   NA  --  136  --    K  --  7.2* 6.1*   CL  --  109*  --    CO2  --  13*  --    BUN  --  80*  --    CREATININE  --  5.16*  --    CALCIUM  --  9.3  --    ALBUMIN  --  3.9  --    PROT  --  7.9*  --    BILITOT  --  0.6  --    ALKPHOS  --  113  --    ALT  --  29  --    AST  --  72*  --    MG  --  3.3*  --    PHOS 6.8*  --   --        All pertinent labs within the past 24 hours have been reviewed.    Significant Imaging: I have reviewed all pertinent imaging results/findings within the past 24 hours.

## 2025-05-01 NOTE — ED NOTES
Called [facility] for transport need, states truck on way back into [facility] so shouldn't be long

## 2025-05-01 NOTE — ED NOTES
Pt seems to be talking more crazy, asking when power came back on, fast talking, keeps talking about something he is seeing on the wall, wife states she can hardly understand him dt jibberish talking, looking around crazily

## 2025-05-01 NOTE — ASSESSMENT & PLAN NOTE
Critical hyperkalemia, causing bradycardia and possible syncope on admission.  Please refer to acute renal failure for further details.

## 2025-05-01 NOTE — ASSESSMENT & PLAN NOTE
Patient with acute renal failure, complicated by severe hyperkalemia.  History of renal mass in the past.  Patient does not want to pursue aggressive/additional treatment.  Once patient is less delirious and it is safe to transport him to get additional imaging, we will perform a CT abdomen pelvis without contrast we will plan to perform a renal ultrasound if clinically indicated.      Nephrology has been consulted.  Patient's healthcare proxy has declined dialysis (aware of the potential complications/risks including fatal arrhythmias/death if worsening hyperkalemia).  Please refer to HPI.    We will continue with sodium bicarbonate drip, monitoring basic metabolic panel frequently and administering additional medical management if needed for membrane stabilization for hyperkalemia.  Patient with a Brooks catheter in place, pending urinalysis.  We will continue to monitor urine output and avoid nephrotoxic medications.

## 2025-05-01 NOTE — ED NOTES
Pt beginning to be agitated again, fighting against us transferring to stretcher for transfer, still confused, getting over to stretcher for transfer

## 2025-05-01 NOTE — ED NOTES
Provider changed oxygen from mask to nc at 2 LPM NC since pt keeps removing it, pt cough improves and wife still at bedside

## 2025-05-01 NOTE — ED NOTES
18 fr quan placed via sterile tech and to gravity. Secured to right upper leg. Scant amt urine returned to bsdb

## 2025-05-01 NOTE — ED NOTES
"Pts coughing is some better, continues to just make, as wife states, "off the wall statements" , he continues to try to slide out of end of the bed intentionally but when you tell him he cannot dt falling hazard he says "oh I forgot" but doesn't follow instructions on how to scoot back in the bed, sr up x 2, whole bed raised and pt put in trendelenburg to keep from sliding out the end, wife at bedside   "

## 2025-05-01 NOTE — ASSESSMENT & PLAN NOTE
Patient delirious on admission, likely in the setting of metabolic encephalopathy.  No focal neurological deficits.  Given benzodiazepines on route for agitation.  We will favor frequent reorientation by staff and family members at bedside and maximize nonpharmacological measures.

## 2025-05-01 NOTE — ASSESSMENT & PLAN NOTE
Severe lactic acidosis.  Likely in setting of hypoperfusion versus distributive shock.  We will continue to monitor.   [FreeTextEntry1] : -PMH: HTN, H/o CVA (2000), AFib (Not on AC 2/2 h/o ICH), CHFrEF, LV Aneurysm w/ Thrombus, Hypothyroidism, GERD, Recurrent UTI, Sleep Dysfxn, Gait Instability -SH: . 3 children. Non-smoker. Occasional EtOH use.   TERESA is a 89 year F whom is here today for f/u  Specialists Involved: -UroGyn: Dr. Mikey Driscoll -Cardio: Dr. Smith ([phone])  AFib, CHF, HT well controlled C/w current meds per cardio  Fall precautions adddressed in detail   Weight stable  will monitor  with regards to chronic reported visuall hallucinations and HS behavioral changes for psat few months (of note recently tx for uti) will send for imaging and refer to neuro   ROT 3-4mo sooner if needed  -F/u labs  drawn in office today -Further recs pending lab results -Continue f/u w/ Cardio (AFib, CHF, LV Aneurysm w/ Thrombus) -RTO 4mo for cpe or sooner if needed

## 2025-05-01 NOTE — ASSESSMENT & PLAN NOTE
Elevated troponin at outside hospital.  Reviewed EKG with no evidence of ST-elevation.  Likely in the setting of acute renal failure.  Patient with no evidence of chest pain.  We will trend out and monitor.

## 2025-05-01 NOTE — H&P
[facility]  Critical Care Medicine  History & Physical    Patient Name: Darron Goddadr  MRN: [id number]  Admission Date: 5/1/2025  Hospital Length of Stay: 0 days  Code Status: DNR  Attending Physician: Ruddy Montana MD   Primary Care Provider: No, Primary Doctor   Principal Problem: <principal problem not specified>    Subjective:     HPI:  This is an 85-year-old gentleman with past medical history significant for renal lesions (in 2023 the patient had CT abdomen pelvis performed which showed bilateral renal cysts versus mass).  In June of 2023 he had ultrasound kidney as well that showed a solid mass with recommendations for CT or MRI with renal mass protocol in the absence of hydronephrosis.     History was obtained from chart review and speaking with the patient's wife.  In the past, the patient has also reportedly been told that he may have renal malignancy and a nephrectomy was recommended however the patient did not want to get a nephrectomy performed .  He does have a history of prostate cancer in the remote past.    The patient has had blood cultures x2 done at the referring facility.    Per the patient's wife, he has been confused and not eating/drinking over the last 2-3 days.  Today, the patient had a syncopal event for a few sec and has after that the patient's wife drove him to the hospital.  He presented to the hospital at which time he was found to be markedly bradycardic.  He was treated with membrane stabilization medication, found to have hyperkalemia and a decision was made to transfer the patient over to the ICU for potential hemodialysis.  On repeat check, the patient's potassium had improved to 6.1.  He has yet another repeat check pending.  He has evidence of marked leukocytosis, hemoglobin at 11.9 and elevated troponin 65.6 without evidence of chest pain.      The patient arrived to [facility] delirious (received lorazepam on route).  He was hemodynamically stable,  continued on epinephrine drip.  I had a very detailed conversation with the patient's wife at bedside.  She shared with me that her  has stated multiple times that he would like to be DNR/DNI and does not want any aggressive measures.  I offered hemodialysis, especially if medical management did not work.  She tells me that based on her 's wishes as well as her decision as the patient's healthcare proxy, they do not wish to proceed with hemodialysis.  They are aware of the potential implications of not getting hemodialysis done especially in the setting of worsening hyperkalemia which may include fatal arrhythmia, cardiac arrest and/or death.    Hospital/ICU Course:  No notes on file     Past Medical History:   Diagnosis Date    Cancer     prostate diagnosed 2013    Diabetes mellitus     H. pylori infection 6/13/2023    Hypertension        Past Surgical History:   Procedure Laterality Date    RADIOACTIVE SEED IMPLANTATION OF PROSTATE         Review of patient's allergies indicates:  No Known Allergies    Family History    None       Tobacco Use    Smoking status: Former     Types: Cigarettes    Smokeless tobacco: Never   Substance and Sexual Activity    Alcohol use: Never    Drug use: Never    Sexual activity: Not Currently      Review of Systems  Objective:     Vital Signs (Most Recent):  Temp: 98 °F (36.7 °C) (05/01/25 1545)  Pulse: 65 (05/01/25 1630)  Resp: (!) 21 (05/01/25 1630)  BP: (!) 142/47 (05/01/25 1630)  SpO2: 95 % (05/01/25 1630) Vital Signs (24h Range):  Temp:  [98 °F (36.7 °C)-99.6 °F (37.6 °C)] 98 °F (36.7 °C)  Pulse:  [37-82] 65  Resp:  [9-39] 21  SpO2:  [90 %-97 %] 95 %  BP: (129-172)/(42-67) 142/47   Weight: 92.4 kg (203 lb 11.3 oz)  Body mass index is 27.63 kg/m².      Intake/Output Summary (Last 24 hours) at 5/1/2025 1648  Last data filed at 5/1/2025 1545  Gross per 24 hour   Intake --   Output 200 ml   Net -200 ml          Physical Exam  Constitutional:       General: He is in  acute distress.      Appearance: He is normal weight. He is ill-appearing. He is not diaphoretic.   HENT:      Head: Normocephalic and atraumatic.      Nose: No congestion or rhinorrhea.      Mouth/Throat:      Mouth: Mucous membranes are moist.   Cardiovascular:      Rate and Rhythm: Normal rate and regular rhythm.      Pulses: Normal pulses.      Heart sounds: Normal heart sounds.   Pulmonary:      Effort: Pulmonary effort is normal. No respiratory distress.      Breath sounds: Normal breath sounds. No stridor. No wheezing, rhonchi or rales.   Chest:      Chest wall: No tenderness.   Abdominal:      General: Abdomen is flat.   Musculoskeletal:      Cervical back: Normal range of motion. No rigidity.      Right lower leg: No edema.      Left lower leg: No edema.   Skin:     General: Skin is warm.      Findings: No erythema.   Neurological:      General: No focal deficit present.      Mental Status: He is disoriented.      Comments: Patient disoriented, delirious.  Moving all 4 extremities equally with power 5/5.  No focal neurological deficits noted.            Vents:  Oxygen Concentration (%): 32 (05/01/25 1619)  Lines/Drains/Airways       Drain  Duration                  Urethral Catheter 05/01/25 1545 <1 day              Peripheral Intravenous Line  Duration                  Peripheral IV - Single Lumen 05/01/25 1545 20 G Right Antecubital <1 day         Peripheral IV - Single Lumen 05/01/25 1545 22 G Posterior;Right Hand <1 day                  Significant Labs:    CBC/Anemia Profile:  Recent Labs   Lab 05/01/25  1059   WBC 22.86*   HGB 11.9*   HCT 37.4*      MCV 91.2   RDW 14.0        Chemistries:  Recent Labs   Lab 05/01/25  1040 05/01/25  1059 05/01/25  1320   NA  --  136  --    K  --  7.2* 6.1*   CL  --  109*  --    CO2  --  13*  --    BUN  --  80*  --    CREATININE  --  5.16*  --    CALCIUM  --  9.3  --    ALBUMIN  --  3.9  --    PROT  --  7.9*  --    BILITOT  --  0.6  --    ALKPHOS  --  113  --     ALT  --  29  --    AST  --  72*  --    MG  --  3.3*  --    PHOS 6.8*  --   --        All pertinent labs within the past 24 hours have been reviewed.    Significant Imaging: I have reviewed all pertinent imaging results/findings within the past 24 hours.  Assessment/Plan:     Neuro  Delirium  Patient delirious on admission, likely in the setting of metabolic encephalopathy.  No focal neurological deficits.  Given benzodiazepines on route for agitation.  We will favor frequent reorientation by staff and family members at bedside and maximize nonpharmacological measures.    Cardiac/Vascular  Elevated troponin  Elevated troponin at outside hospital.  Reviewed EKG with no evidence of ST-elevation.  Likely in the setting of acute renal failure.  Patient with no evidence of chest pain.  We will trend out and monitor.    Shock  Patient with evidence of shock, has been initiated on epinephrine.  Likely in the setting of acute renal failure and bradycardia causing hemodynamic instability.  Has been able to wean down epinephrine with plans to discontinue epinephrine altogether and initiate patient on dopamine if needed for bradycardia or norepinephrine if needed for hypotension.      Severe lactic acidosis.  Likely in the setting of hypoperfusion versus distributive shock.  We will treat with broad-spectrum antibiotics, IV fluids and monitor lactic acidosis.    Renal/  Metabolic acidosis  Severe lactic acidosis.  Likely in setting of hypoperfusion versus distributive shock.  We will continue to monitor.    Hyperkalemia  Critical hyperkalemia, causing bradycardia and possible syncope on admission.  Please refer to acute renal failure for further details.    Acute renal failure  Patient with acute renal failure, complicated by severe hyperkalemia.  History of renal mass in the past.  Patient does not want to pursue aggressive/additional treatment.  Once patient is less delirious and it is safe to transport him to get  additional imaging, we will perform a CT abdomen pelvis without contrast we will plan to perform a renal ultrasound if clinically indicated.      Nephrology has been consulted.  Patient's healthcare proxy has declined dialysis (aware of the potential complications/risks including fatal arrhythmias/death if worsening hyperkalemia).  Please refer to HPI.    We will continue with sodium bicarbonate drip, monitoring basic metabolic panel frequently and administering additional medical management if needed for membrane stabilization for hyperkalemia.  Patient with a Brooks catheter in place, pending urinalysis.  We will continue to monitor urine output and avoid nephrotoxic medications.    ID  Severe sepsis  Patient with what appears to be severe sepsis in the setting of leukocytosis, elevated lactate causing distributive shock.  We have obtained blood cultures, will volume resuscitate with  additional IV fluid boluses (judicious use of IV fluid boluses in the setting of renal failure).  Initiated on broad spectrum antibiotics.  Pending urinalysis.    Endocrine  Diabetes mellitus  On insulin sliding scale     Critical Care Checklist    Refer to chart for details regarding spontaneous awakening trial (SAT) and spontaneous breathing trial (SBT), CAM-ICU assessment, early mobility and feeding.      Analgesia and sedation- not indicated  Thromboembolic prophylaxis- heparin for DVT prophylaxis  Height of bed greater than 30°- Yes  Stress ulcer prophylaxis- not indicated  Indwelling catheter (vascular access lines/Brooks catheter)-Reviewed  Deescalation of antimicrobials/pharmacotherapies-Reviewed and addressed appropriately  Code status- DNR        Critical Care Time:  90 minutes  Critical secondary to Patient has a condition that poses threat to life and bodily function:  Severe hyperkalemia, bradycardia, shock requiring vasopressor support, acute renal failure      Critical care was time spent personally by me on the  following activities: development of treatment plan with patient or surrogate and bedside caregivers, discussions with consultants, evaluation of patient's response to treatment, examination of patient, ordering and performing treatments and interventions, ordering and review of laboratory studies, ordering and review of radiographic studies, pulse oximetry, re-evaluation of patient's condition. This critical care time did not overlap with that of any other provider or involve time for any procedures.     Ruddy Montana MD  Critical Care Medicine  [facility]

## 2025-05-01 NOTE — ED NOTES
Pt is trying to get out of bed, cussing his wife and demanding she let him out of bed but also just rambling with his speech about many subjects and none making sense

## 2025-05-01 NOTE — NURSING
Received from EMS transport from [facility] at 1545, transferred with RAC IV and R hand IV, quan, epi infusion at 0.04mcg/kg/hr, and bicarb infusion at 100mL/hr.

## 2025-05-01 NOTE — ED TRIAGE NOTES
Brought in by wife with c/o weakness with abd pain and not feeling well. Wife states he passed out earlier at home. He was assisted from wheelchair to bed and sat down on bedside. After sitting patient began to lean forward and become unresponsive. Became pale and was unresponsive to sternal rub. +pulse + resp

## 2025-05-01 NOTE — PROGRESS NOTES
Pharmacokinetic Initial Assessment: IV Vancomycin    Assessment/Plan:    Initiate intravenous vancomycin with loading dose of 1750 mg once with subsequent doses when random concentrations are less than 20 mcg/mL  Desired empiric serum trough concentration is 10 to 15 mcg/mL  Draw vancomycin random level on 5/3 at 0430.  Pharmacy will continue to follow and monitor vancomycin.      Please contact pharmacy at extension [phone] with any questions regarding this assessment.     Thank you for the consult,   Gordy  [place]       Patient brief summary:  Darron Goddard is a 85 y.o. male initiated on antimicrobial therapy with IV Vancomycin for treatment of suspected urinary tract infection    Drug Allergies:   Review of patient's allergies indicates:  No Known Allergies    Actual Body Weight:   77.6 kg    Renal Function:   Estimated Creatinine Clearance: 11.5 mL/min (A) (based on SCr of 5.16 mg/dL (H)).,     Dialysis Method (if applicable):  N/A    CBC (last 72 hours):  Recent Labs   Lab Result Units 05/01/25  1059   WBC K/uL 22.86*   Hemoglobin g/dL 11.9*   Hematocrit % 37.4*   Platelet Count K/uL 349   Lymphocytes % % 10.5*   Lymphocytes, Man % % 19*   Monocytes % % 6.6*   Monocytes, Man % % 2   Eosinophils % % 0.0*   Basophils % % 0.1   Diff Type  Manual       Metabolic Panel (last 72 hours):  Recent Labs   Lab Result Units 05/01/25  1040 05/01/25  1059 05/01/25  1100 05/01/25  1320   Sodium mmol/L  --  136  --   --    Potassium mmol/L  --  7.2*  --  6.1*   Chloride mmol/L  --  109*  --   --    CO2 mmol/L  --  13*  --   --    Glucose mg/dL  --  166*  --   --    Glucose, UA mg/dL  --   --  100*  --    BUN mg/dL  --  80*  --   --    Creatinine mg/dL  --  5.16*  --   --    Albumin g/dL  --  3.9  --   --    Bilirubin, Total mg/dL  --  0.6  --   --    Alk Phos U/L  --  113  --   --    AST U/L  --  72*  --   --    ALT U/L  --  29  --   --    Magnesium mg/dL  --  3.3*  --   --    Phosphorus mg/dL 6.8*  --   --   --   "      Drug levels (last 3 results):  No results for input(s): "VANCOMYCINRA", "VANCORANDOM", "VANCOMYCINPE", "VANCOPEAK", "VANCOMYCINTR", "VANCOTROUGH" in the last 72 hours.    Microbiologic Results:  Microbiology Results (last 7 days)       ** No results found for the last 168 hours. **            "

## 2025-05-02 LAB
ANION GAP SERPL CALCULATED.3IONS-SCNC: 18 MMOL/L (ref 7–16)
ANION GAP SERPL CALCULATED.3IONS-SCNC: 18 MMOL/L (ref 7–16)
ANION GAP SERPL CALCULATED.3IONS-SCNC: 19 MMOL/L (ref 7–16)
ANION GAP SERPL CALCULATED.3IONS-SCNC: 20 MMOL/L (ref 7–16)
ANION GAP SERPL CALCULATED.3IONS-SCNC: 22 MMOL/L (ref 7–16)
ANION GAP SERPL CALCULATED.3IONS-SCNC: 25 MMOL/L (ref 7–16)
BASOPHILS # BLD AUTO: 0.02 K/UL (ref 0–0.2)
BASOPHILS NFR BLD AUTO: 0.1 % (ref 0–1)
BILIRUB UR QL STRIP: NEGATIVE
BUN SERPL-MCNC: 67 MG/DL (ref 8–26)
BUN SERPL-MCNC: 68 MG/DL (ref 8–26)
BUN SERPL-MCNC: 69 MG/DL (ref 8–26)
BUN SERPL-MCNC: 71 MG/DL (ref 8–26)
BUN SERPL-MCNC: 72 MG/DL (ref 8–26)
BUN SERPL-MCNC: 75 MG/DL (ref 8–26)
BUN/CREAT SERPL: 14 (ref 6–20)
BUN/CREAT SERPL: 17 (ref 6–20)
BUN/CREAT SERPL: 17 (ref 6–20)
BUN/CREAT SERPL: 18 (ref 6–20)
CALCIUM SERPL-MCNC: 8.6 MG/DL (ref 8.8–10)
CALCIUM SERPL-MCNC: 8.9 MG/DL (ref 8.8–10)
CALCIUM SERPL-MCNC: 9 MG/DL (ref 8.8–10)
CALCIUM SERPL-MCNC: 9 MG/DL (ref 8.8–10)
CHLORIDE SERPL-SCNC: 102 MMOL/L (ref 98–107)
CHLORIDE SERPL-SCNC: 103 MMOL/L (ref 98–107)
CHLORIDE SERPL-SCNC: 104 MMOL/L (ref 98–107)
CHLORIDE SERPL-SCNC: 104 MMOL/L (ref 98–107)
CHLORIDE SERPL-SCNC: 106 MMOL/L (ref 98–107)
CHLORIDE SERPL-SCNC: 109 MMOL/L (ref 98–107)
CK SERPL-CCNC: 6178 U/L (ref 30–200)
CLARITY UR: CLEAR
CO2 SERPL-SCNC: 12 MMOL/L (ref 23–31)
CO2 SERPL-SCNC: 18 MMOL/L (ref 23–31)
CO2 SERPL-SCNC: 23 MMOL/L (ref 23–31)
CO2 SERPL-SCNC: 24 MMOL/L (ref 23–31)
CO2 SERPL-SCNC: 24 MMOL/L (ref 23–31)
CO2 SERPL-SCNC: 26 MMOL/L (ref 23–31)
COLOR UR: COLORLESS
CREAT SERPL-MCNC: 3.81 MG/DL (ref 0.72–1.25)
CREAT SERPL-MCNC: 3.98 MG/DL (ref 0.72–1.25)
CREAT SERPL-MCNC: 3.99 MG/DL (ref 0.72–1.25)
CREAT SERPL-MCNC: 4.07 MG/DL (ref 0.72–1.25)
CREAT SERPL-MCNC: 4.38 MG/DL (ref 0.72–1.25)
CREAT SERPL-MCNC: 4.86 MG/DL (ref 0.72–1.25)
DIFFERENTIAL METHOD BLD: ABNORMAL
EGFR (NO RACE VARIABLE) (RUSH/TITUS): 11 ML/MIN/1.73M2
EGFR (NO RACE VARIABLE) (RUSH/TITUS): 13 ML/MIN/1.73M2
EGFR (NO RACE VARIABLE) (RUSH/TITUS): 14 ML/MIN/1.73M2
EGFR (NO RACE VARIABLE) (RUSH/TITUS): 15 ML/MIN/1.73M2
EOSINOPHIL # BLD AUTO: 0 K/UL (ref 0–0.5)
EOSINOPHIL NFR BLD AUTO: 0 % (ref 1–4)
ERYTHROCYTE [DISTWIDTH] IN BLOOD BY AUTOMATED COUNT: 13.5 % (ref 11.5–14.5)
GLUCOSE SERPL-MCNC: 103 MG/DL (ref 82–115)
GLUCOSE SERPL-MCNC: 113 MG/DL (ref 70–105)
GLUCOSE SERPL-MCNC: 113 MG/DL (ref 82–115)
GLUCOSE SERPL-MCNC: 117 MG/DL (ref 70–105)
GLUCOSE SERPL-MCNC: 122 MG/DL (ref 82–115)
GLUCOSE SERPL-MCNC: 124 MG/DL (ref 82–115)
GLUCOSE SERPL-MCNC: 213 MG/DL (ref 70–105)
GLUCOSE SERPL-MCNC: 312 MG/DL (ref 82–115)
GLUCOSE SERPL-MCNC: 81 MG/DL (ref 82–115)
GLUCOSE SERPL-MCNC: 90 MG/DL (ref 70–105)
GLUCOSE UR STRIP-MCNC: 200 MG/DL
HCT VFR BLD AUTO: 33 % (ref 40–54)
HGB BLD-MCNC: 10.2 G/DL (ref 13.5–18)
IMM GRANULOCYTES # BLD AUTO: 0.1 K/UL (ref 0–0.04)
IMM GRANULOCYTES NFR BLD: 0.7 % (ref 0–0.4)
KETONES UR STRIP-SCNC: NEGATIVE MG/DL
LACTATE SERPL-SCNC: 1.6 MMOL/L (ref 0.5–2.2)
LACTATE SERPL-SCNC: 3.2 MMOL/L (ref 0.5–2.2)
LEUKOCYTE ESTERASE UR QL STRIP: NEGATIVE
LYMPHOCYTES # BLD AUTO: 1.27 K/UL (ref 1–4.8)
LYMPHOCYTES NFR BLD AUTO: 8.7 % (ref 27–41)
MCH RBC QN AUTO: 28.3 PG (ref 27–31)
MCHC RBC AUTO-ENTMCNC: 30.9 G/DL (ref 32–36)
MCV RBC AUTO: 91.4 FL (ref 80–96)
MONOCYTES # BLD AUTO: 0.97 K/UL (ref 0–0.8)
MONOCYTES NFR BLD AUTO: 6.6 % (ref 2–6)
MPC BLD CALC-MCNC: 9.8 FL (ref 9.4–12.4)
NEUTROPHILS # BLD AUTO: 12.29 K/UL (ref 1.8–7.7)
NEUTROPHILS NFR BLD AUTO: 83.9 % (ref 53–65)
NITRITE UR QL STRIP: NEGATIVE
NRBC # BLD AUTO: 0 X10E3/UL
NRBC, AUTO (.00): 0 %
PH UR STRIP: 6.5 PH UNITS
PLATELET # BLD AUTO: 266 K/UL (ref 150–400)
POTASSIUM SERPL-SCNC: 4 MMOL/L (ref 3.5–5.1)
POTASSIUM SERPL-SCNC: 4.1 MMOL/L (ref 3.5–5.1)
POTASSIUM SERPL-SCNC: 4.3 MMOL/L (ref 3.5–5.1)
POTASSIUM SERPL-SCNC: 4.3 MMOL/L (ref 3.5–5.1)
POTASSIUM SERPL-SCNC: 4.6 MMOL/L (ref 3.5–5.1)
POTASSIUM SERPL-SCNC: 6.3 MMOL/L (ref 3.5–5.1)
PROT UR QL STRIP: 50
RBC # BLD AUTO: 3.61 M/UL (ref 4.6–6.2)
RBC # UR STRIP: ABNORMAL /UL
RBC #/AREA URNS HPF: 57 /HPF
SODIUM SERPL-SCNC: 139 MMOL/L (ref 136–145)
SODIUM SERPL-SCNC: 140 MMOL/L (ref 136–145)
SODIUM SERPL-SCNC: 142 MMOL/L (ref 136–145)
SODIUM SERPL-SCNC: 143 MMOL/L (ref 136–145)
SP GR UR STRIP: 1.01
UROBILINOGEN UR STRIP-ACNC: NORMAL MG/DL
WBC # BLD AUTO: 14.65 K/UL (ref 4.5–11)
WBC #/AREA URNS HPF: 4 /HPF

## 2025-05-02 PROCEDURE — 63600175 PHARM REV CODE 636 W HCPCS: Performed by: STUDENT IN AN ORGANIZED HEALTH CARE EDUCATION/TRAINING PROGRAM

## 2025-05-02 PROCEDURE — 25000003 PHARM REV CODE 250: Performed by: STUDENT IN AN ORGANIZED HEALTH CARE EDUCATION/TRAINING PROGRAM

## 2025-05-02 PROCEDURE — 85025 COMPLETE CBC W/AUTO DIFF WBC: CPT | Performed by: STUDENT IN AN ORGANIZED HEALTH CARE EDUCATION/TRAINING PROGRAM

## 2025-05-02 PROCEDURE — 27000221 HC OXYGEN, UP TO 24 HOURS

## 2025-05-02 PROCEDURE — 80048 BASIC METABOLIC PNL TOTAL CA: CPT | Performed by: HOSPITALIST

## 2025-05-02 PROCEDURE — 25000003 PHARM REV CODE 250: Performed by: HOSPITALIST

## 2025-05-02 PROCEDURE — 25000003 PHARM REV CODE 250

## 2025-05-02 PROCEDURE — 82962 GLUCOSE BLOOD TEST: CPT

## 2025-05-02 PROCEDURE — 63600175 PHARM REV CODE 636 W HCPCS: Performed by: HOSPITALIST

## 2025-05-02 PROCEDURE — 11000001 HC ACUTE MED/SURG PRIVATE ROOM

## 2025-05-02 PROCEDURE — 80048 BASIC METABOLIC PNL TOTAL CA: CPT | Performed by: STUDENT IN AN ORGANIZED HEALTH CARE EDUCATION/TRAINING PROGRAM

## 2025-05-02 PROCEDURE — 99233 SBSQ HOSP IP/OBS HIGH 50: CPT | Mod: ,,, | Performed by: STUDENT IN AN ORGANIZED HEALTH CARE EDUCATION/TRAINING PROGRAM

## 2025-05-02 PROCEDURE — 99223 1ST HOSP IP/OBS HIGH 75: CPT | Mod: ,,, | Performed by: INTERNAL MEDICINE

## 2025-05-02 PROCEDURE — 94761 N-INVAS EAR/PLS OXIMETRY MLT: CPT

## 2025-05-02 PROCEDURE — 99900035 HC TECH TIME PER 15 MIN (STAT)

## 2025-05-02 PROCEDURE — 36415 COLL VENOUS BLD VENIPUNCTURE: CPT | Performed by: STUDENT IN AN ORGANIZED HEALTH CARE EDUCATION/TRAINING PROGRAM

## 2025-05-02 PROCEDURE — 82550 ASSAY OF CK (CPK): CPT | Performed by: INTERNAL MEDICINE

## 2025-05-02 PROCEDURE — 94799 UNLISTED PULMONARY SVC/PX: CPT

## 2025-05-02 RX ORDER — CALCIUM GLUCONATE 20 MG/ML
1 INJECTION, SOLUTION INTRAVENOUS EVERY 10 MIN PRN
Status: DISCONTINUED | OUTPATIENT
Start: 2025-05-02 | End: 2025-05-06 | Stop reason: HOSPADM

## 2025-05-02 RX ORDER — FUROSEMIDE 10 MG/ML
80 INJECTION INTRAMUSCULAR; INTRAVENOUS ONCE
Status: COMPLETED | OUTPATIENT
Start: 2025-05-02 | End: 2025-05-02

## 2025-05-02 RX ORDER — ALBUTEROL SULFATE 0.83 MG/ML
2.5 SOLUTION RESPIRATORY (INHALATION) EVERY 4 HOURS PRN
Status: DISCONTINUED | OUTPATIENT
Start: 2025-05-02 | End: 2025-05-06 | Stop reason: HOSPADM

## 2025-05-02 RX ORDER — CALCIUM GLUCONATE 20 MG/ML
1 INJECTION, SOLUTION INTRAVENOUS ONCE
Status: COMPLETED | OUTPATIENT
Start: 2025-05-02 | End: 2025-05-02

## 2025-05-02 RX ADMIN — HUMAN INSULIN 9.24 UNITS: 100 INJECTION, SOLUTION SUBCUTANEOUS at 01:05

## 2025-05-02 RX ADMIN — CEFEPIME 1 G: 1 INJECTION, POWDER, FOR SOLUTION INTRAMUSCULAR; INTRAVENOUS at 05:05

## 2025-05-02 RX ADMIN — SODIUM BICARBONATE: 84 INJECTION, SOLUTION INTRAVENOUS at 12:05

## 2025-05-02 RX ADMIN — HEPARIN SODIUM 5000 UNITS: 5000 INJECTION, SOLUTION INTRAVENOUS; SUBCUTANEOUS at 10:05

## 2025-05-02 RX ADMIN — CALCIUM GLUCONATE 1 G: 20 INJECTION, SOLUTION INTRAVENOUS at 01:05

## 2025-05-02 RX ADMIN — HEPARIN SODIUM 5000 UNITS: 5000 INJECTION, SOLUTION INTRAVENOUS; SUBCUTANEOUS at 03:05

## 2025-05-02 RX ADMIN — FUROSEMIDE 80 MG: 10 INJECTION, SOLUTION INTRAVENOUS at 01:05

## 2025-05-02 RX ADMIN — MUPIROCIN: 20 OINTMENT TOPICAL at 08:05

## 2025-05-02 RX ADMIN — HEPARIN SODIUM 5000 UNITS: 5000 INJECTION, SOLUTION INTRAVENOUS; SUBCUTANEOUS at 05:05

## 2025-05-02 RX ADMIN — DEXTROSE MONOHYDRATE 50 ML: 25 INJECTION, SOLUTION INTRAVENOUS at 01:05

## 2025-05-02 RX ADMIN — INSULIN ASPART 4 UNITS: 100 INJECTION, SOLUTION INTRAVENOUS; SUBCUTANEOUS at 04:05

## 2025-05-02 NOTE — ASSESSMENT & PLAN NOTE
Elevated troponin at outside hospital.  Reviewed EKG with no evidence of ST-elevation.  Likely in the setting of acute renal failure.  Patient with no evidence of chest pain.

## 2025-05-02 NOTE — PLAN OF CARE
Problem: Diabetes Comorbidity  Goal: Blood Glucose Level Within Targeted Range  Outcome: Progressing  Intervention: Monitor and Manage Glycemia  Flowsheets (Taken 5/2/2025 1750)  Glycemic Management:   blood glucose monitored   supplemental insulin given     Problem: Adult Inpatient Plan of Care  Goal: Plan of Care Review  Flowsheets (Taken 5/2/2025 1750)  Plan of Care Reviewed With:   patient   spouse  Goal: Optimal Comfort and Wellbeing  Intervention: Provide Person-Centered Care  Flowsheets (Taken 5/2/2025 1750)  Trust Relationship/Rapport:   care explained   choices provided     Problem: Skin Injury Risk Increased  Goal: Skin Health and Integrity  Intervention: Optimize Skin Protection  Flowsheets (Taken 5/2/2025 1750)  Pressure Reduction Techniques: weight shift assistance provided  Skin Protection: pulse oximeter probe site changed  Activity Management: Arm raise - L1  Head of Bed (HOB) Positioning: HOB at 30-45 degrees

## 2025-05-02 NOTE — CONSULTS
"                                  [facility] Consult History and Physical   Patient Name: Darron Goddard  MRN: [id number]  Age: 85 y.o.  : 1940  Time:  9:10 AM  Admission Date: 2025    Consulted for:  LATONIA  Consulted by: Dr Montana    HPI:   Darron Goddard is a  85-year-old male  With medical history significant for prostate cancer diabetes, renal cancer who presents to the hospital with symptomatic bradycardia room an outside hospital.  He was found to have a potassium of 7 at status.  He also was noted to be in renal failure.  He was transferred to our facility for further management.  He had a Brooks placed at the outside facility.  He had 3 L of urine output since Brooks placement.  Nephrology is consulted for LATONIA on CKD, hyperkalemia.     Past Medical History:  has a past medical history of Cancer, Diabetes mellitus, H. pylori infection (2023), and Hypertension.     Past Surgical History:   has a past surgical history that includes Radioactive seed implantation of prostate.     Family History:  family history is not on file. No family history of kidney disease.     Social History:   reports that he has quit smoking. His smoking use included cigarettes. He has never used smokeless tobacco. He reports that he does not drink alcohol and does not use drugs.     Allergies: has no known allergies.     Medications prior to admission: Reviewed including OTC medications, herbal supplements, and NSAIDS.     Old records have been reviewed.       Review of Systems  ROS: A 10 point ROS was completed and found to be negative except for that mentioned above in the HPI.       Physical Exam:   /63   Pulse 75   Temp 98.4 °F (36.9 °C) (Oral)   Resp (!) 25   Ht 5' 10" (1.778 m)   Wt 89.9 kg (198 lb 3.1 oz)   SpO2 (!) 92%   BMI 28.44 kg/m²     Constitutional: lying in bed, in NAD  Eyes: EOMI, white sclera  ENMT: moist mucus membranes, nares patent  Cardiovascular: normal rate, S1/S2 noted, " no edema  Respiratory: symmetrical chest expansion, CTA-B  Gastrointestinal: +BS, soft, NT/ND  Musculoskeletal: normal, no joint erythema/effusions  Skin: no rash, no purpura, warm extremities  Neurological: Alert and Oriented x 4, afocal    Data Review:  Lab:   Labs reviewed and significant values discussed below.    Recent Labs     05/01/25 2012 05/02/25  0008 05/02/25  0203   CALCIUM 9.0 8.6* 9.0    140 139   K 5.4* 6.3* 4.6   * 109* 106   CO2 17* 12* 18*   BUN 76* 68* 75*   CREATININE 4.66* 4.86* 4.38*   * 113 312*       Imaging:  Independent review of CT with known renal mass, no hydronephrosis    Assessment/Plan:   Problem List[1]      LATONIA  Hyperkalemia  Metabolic acidosis  - Acute complicated illness that poses a threat to life or bodily function without treatment  - Discussed with consulting service/ICU team   - Records reviewed prior to admission, Baseline cr 1.  - Admission- Cr 1.7 in 2023  - I wonder if he had some obstructive uropathy that was relieved with Brooks placement.  He had about 4 L of urine output since Brooks placement yesterday  - I agree with sodium bicarb IV fluids and Brooks placement.  I will schedule Lokelma daily to help assist with controlling potassium.  This can be increased to 3 times daily  - confirmed with family member inpatient that they do not wish to pursue dialysis.  I do not think that he needs dialysis at this time.  However if he were to need it the family does not wish to pursue that option  We will add a CPK   - Labs: Will order renal function for tomorrow   - Please avoid nephrotoxic agents/NSAIDs  - Renally dose all medications   - Please monitor strict UOP  - Daily weights    Thank you for the consult. Will follow along. Please call with questions.    Alisha S. Parker, DO [facility]   05/02/2025        [1]   Patient Active Problem List  Diagnosis    Acute renal failure    Hyperkalemia    Metabolic acidosis    Hypertension    Diabetes  mellitus    Cancer    Acute cystitis with hematuria    Hyperlipidemia    Esophageal dysphagia    Esophagitis    HH (hiatus hernia)    Duodenitis    H. pylori infection    Delirium    Shock    Severe sepsis    Elevated troponin

## 2025-05-02 NOTE — SUBJECTIVE & OBJECTIVE
Past Medical History:   Diagnosis Date    Cancer     prostate diagnosed 2013    Diabetes mellitus     H. pylori infection 6/13/2023    Hypertension        Past Surgical History:   Procedure Laterality Date    RADIOACTIVE SEED IMPLANTATION OF PROSTATE         Review of patient's allergies indicates:  No Known Allergies    Family History    None       Tobacco Use    Smoking status: Former     Types: Cigarettes    Smokeless tobacco: Never   Substance and Sexual Activity    Alcohol use: Never    Drug use: Never    Sexual activity: Not Currently      Review of Systems  Objective:     Vital Signs (Most Recent):  Temp: 97.8 °F (36.6 °C) (05/02/25 1123)  Pulse: 79 (05/02/25 1130)  Resp: 15 (05/02/25 1130)  BP: 128/67 (05/02/25 1130)  SpO2: (!) 90 % (05/02/25 1130) Vital Signs (24h Range):  Temp:  [97.8 °F (36.6 °C)-98.4 °F (36.9 °C)] 97.8 °F (36.6 °C)  Pulse:  [64-91] 79  Resp:  [13-31] 15  SpO2:  [89 %-99 %] 90 %  BP: ()/(35-86) 128/67   Weight: 89.9 kg (198 lb 3.1 oz)  Body mass index is 28.44 kg/m².      Intake/Output Summary (Last 24 hours) at 5/2/2025 1753  Last data filed at 5/2/2025 1615  Gross per 24 hour   Intake 650.95 ml   Output 4475 ml   Net -3824.05 ml          Physical Exam  Constitutional:       General: He is in acute distress.      Appearance: He is normal weight. He is ill-appearing. He is not diaphoretic.   HENT:      Head: Normocephalic and atraumatic.      Nose: No congestion or rhinorrhea.      Mouth/Throat:      Mouth: Mucous membranes are moist.   Cardiovascular:      Rate and Rhythm: Normal rate and regular rhythm.      Pulses: Normal pulses.      Heart sounds: Normal heart sounds.   Pulmonary:      Effort: Pulmonary effort is normal. No respiratory distress.      Breath sounds: Normal breath sounds. No stridor. No wheezing, rhonchi or rales.   Chest:      Chest wall: No tenderness.   Abdominal:      General: Abdomen is flat.   Musculoskeletal:      Cervical back: Normal range of motion. No  rigidity.      Right lower leg: No edema.      Left lower leg: No edema.   Skin:     General: Skin is warm.      Findings: No erythema.   Neurological:      General: No focal deficit present.      Mental Status: He is disoriented.      Comments: Patient disoriented, delirious.  Moving all 4 extremities equally with power 5/5.  No focal neurological deficits noted.            Vents:  Oxygen Concentration (%): 32 (05/02/25 0902)  Lines/Drains/Airways       Drain  Duration                  Urethral Catheter 05/01/25 1545 1 day              Peripheral Intravenous Line  Duration                  Peripheral IV - Single Lumen 05/01/25 1545 20 G Right Antecubital 1 day                  Significant Labs:    CBC/Anemia Profile:  Recent Labs   Lab 05/01/25  1059 05/02/25  0203   WBC 22.86* 14.65*   HGB 11.9* 10.2*   HCT 37.4* 33.0*    266   MCV 91.2 91.4   RDW 14.0 13.5        Chemistries:  Recent Labs   Lab 05/01/25  1040 05/01/25  1059 05/01/25  1320 05/02/25  0203 05/02/25  0848 05/02/25  1015   NA  --  136   < > 139 142 143   K  --  7.2*   < > 4.6 4.3 4.3   CL  --  109*   < > 106 104 104   CO2  --  13*   < > 18* 24 24   BUN  --  80*   < > 75* 72* 71*   CREATININE  --  5.16*   < > 4.38* 4.07* 3.99*   CALCIUM  --  9.3   < > 9.0 8.9 9.0   ALBUMIN  --  3.9  --   --   --   --    PROT  --  7.9*  --   --   --   --    BILITOT  --  0.6  --   --   --   --    ALKPHOS  --  113  --   --   --   --    ALT  --  29  --   --   --   --    AST  --  72*  --   --   --   --    MG  --  3.3*  --   --   --   --    PHOS 6.8*  --   --   --   --   --     < > = values in this interval not displayed.       All pertinent labs within the past 24 hours have been reviewed.    Significant Imaging: I have reviewed all pertinent imaging results/findings within the past 24 hours.

## 2025-05-02 NOTE — ASSESSMENT & PLAN NOTE
Markedly improved, continue antibiotics and deescalate rapidly based on clinical stability and cultures.

## 2025-05-02 NOTE — ASSESSMENT & PLAN NOTE
Patient with acute renal failure, complicated by severe hyperkalemia.  History of renal mass in the past.  Patient does not want to pursue aggressive/additional treatment.      Appreciate Nephrology consultation and recommendation     CT with evidence of renal mass.  Patient and family do not want to pursue any additional workup or treatment for this or the thoracic and abdominal aortic dilations.  I confirmed this once again at bedside with the patient and the patient's wife and they were in agreement and did not want additional treatment/testing/interventions for workup of the renal mass or thoracic/abdominal aortic aneurysmal dilations.

## 2025-05-02 NOTE — PROGRESS NOTES
[facility]  Wound Care    Patient Name:  Darron Goddard   MRN:  [id number]  Date: 5/2/2025  Diagnosis: Hyperkalemia    History:     Past Medical History:   Diagnosis Date    Cancer     prostate diagnosed 2013    Diabetes mellitus     H. pylori infection 6/13/2023    Hypertension        Social History[1]    Precautions:     Allergies as of 05/01/2025    (No Known Allergies)       WOC Assessment Details/Treatment     Narrative: Seen patient for initiation of preventative skin care measures    Patient in bed. Alert. Family at bedside.Photos in media noted purple area to left posterior heel. Sacral pink discoloration. Has quan catheter in place. On low air loss mattress.  Andry score 11.    Nursing staff to consult WCC if needed.           05/02/2025         [1]   Social History  Socioeconomic History    Marital status:    Tobacco Use    Smoking status: Former     Types: Cigarettes    Smokeless tobacco: Never   Substance and Sexual Activity    Alcohol use: Never    Drug use: Never    Sexual activity: Not Currently     Social Drivers of Health     Financial Resource Strain: Low Risk  (5/2/2025)    Overall Financial Resource Strain (CARDIA)     Difficulty of Paying Living Expenses: Not hard at all   Food Insecurity: No Food Insecurity (5/2/2025)    Hunger Vital Sign     Worried About Running Out of Food in the Last Year: Never true     Ran Out of Food in the Last Year: Never true   Transportation Needs: No Transportation Needs (5/2/2025)    PRAPARE - Transportation     Lack of Transportation (Medical): No     Lack of Transportation (Non-Medical): No   Physical Activity: Inactive (5/2/2025)    Exercise Vital Sign     Days of Exercise per Week: 0 days     Minutes of Exercise per Session: 0 min   Stress: No Stress Concern Present (5/2/2025)    [employer] [place] [employer] - Occupational Stress Questionnaire     Feeling of Stress : Not at all   Housing Stability: Low Risk   (5/2/2025)    Housing Stability Vital Sign     Unable to Pay for Housing in the Last Year: No     Homeless in the Last Year: No

## 2025-05-02 NOTE — PLAN OF CARE
Problem: Diabetes Comorbidity  Goal: Blood Glucose Level Within Targeted Range  Outcome: Progressing     Problem: Adult Inpatient Plan of Care  Goal: Plan of Care Review  Outcome: Progressing     Problem: Fall Injury Risk  Goal: Absence of Fall and Fall-Related Injury  Outcome: Progressing     Problem: Skin Injury Risk Increased  Goal: Skin Health and Integrity  Outcome: Progressing

## 2025-05-02 NOTE — HOSPITAL COURSE
5/2/25-improvement in patient's hyperkalemia, continued leukocytosis with ongoing improvement.  Urine output has been reassuring.  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  Family reiterated again to me at bedside that patient did not want any follow up or undergo intervention for either of those.

## 2025-05-02 NOTE — ASSESSMENT & PLAN NOTE
Patient delirious on admission, likely in the setting of metabolic encephalopathy.  No focal neurological deficits.  Given benzodiazepines on route for agitation by EMS/referring hospital.  We will favor frequent reorientation by staff and family members at bedside and maximize nonpharmacological measures.  5/2/25-significant improvement in patient's mental status with frequent regurgitation by family and staff at bedside.   Clindamycin Counseling: I counseled the patient regarding use of clindamycin as an antibiotic for prophylactic and/or therapeutic purposes. Clindamycin is active against numerous classes of bacteria, including skin bacteria. Side effects may include nausea, diarrhea, gastrointestinal upset, rash, hives, yeast infections, and in rare cases, colitis.

## 2025-05-02 NOTE — PROGRESS NOTES
[facility]  Critical Care Medicine  Progress Note    Patient Name: Darron Goddard  MRN: [id number]  Admission Date: 5/1/2025  Hospital Length of Stay: 1 days  Code Status: DNR  Attending Provider: Ruddy Montana MD  Primary Care Provider: Jade, Primary Doctor   Principal Problem: Hyperkalemia    Subjective:     HPI:  This is an 85-year-old gentleman with past medical history significant for renal lesions (in 2023 the patient had CT abdomen pelvis performed which showed bilateral renal cysts versus mass).  In June of 2023 he had ultrasound kidney as well that showed a solid mass with recommendations for CT or MRI with renal mass protocol in the absence of hydronephrosis.     History was obtained from chart review and speaking with the patient's wife.  In the past, the patient has also reportedly been told that he may have renal malignancy and a nephrectomy was recommended however the patient did not want to get a nephrectomy performed .  He does have a history of prostate cancer in the remote past.    The patient has had blood cultures x2 done at the referring facility.    Per the patient's wife, he has been confused and not eating/drinking over the last 2-3 days.  Today, the patient had a syncopal event for a few sec and has after that the patient's wife drove him to the hospital.  He presented to the hospital at which time he was found to be markedly bradycardic.  He was treated with membrane stabilization medication, found to have hyperkalemia and a decision was made to transfer the patient over to the ICU for potential hemodialysis.  On repeat check, the patient's potassium had improved to 6.1.  He has yet another repeat check pending.  He has evidence of marked leukocytosis, hemoglobin at 11.9 and elevated troponin 65.6 without evidence of chest pain.      The patient arrived to [facility] delirious (received lorazepam on route).  He was hemodynamically stable, continued on epinephrine drip.  I  had a very detailed conversation with the patient's wife at bedside.  She shared with me that her  has stated multiple times that he would like to be DNR/DNI and does not want any aggressive measures.  I offered hemodialysis, especially if medical management did not work.  She tells me that based on her 's wishes as well as her decision as the patient's healthcare proxy, they do not wish to proceed with hemodialysis.  They are aware of the potential implications of not getting hemodialysis done especially in the setting of worsening hyperkalemia which may include fatal arrhythmia, cardiac arrest and/or death.    Hospital/ICU Course:  5/2/25-improvement in patient's hyperkalemia, continued leukocytosis with ongoing improvement.  Urine output has been reassuring.  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  Family reiterated again to me at bedside that patient did not want any follow up or undergo intervention for either of those.    Past Medical History:   Diagnosis Date    Cancer     prostate diagnosed 2013    Diabetes mellitus     H. pylori infection 6/13/2023    Hypertension        Past Surgical History:   Procedure Laterality Date    RADIOACTIVE SEED IMPLANTATION OF PROSTATE         Review of patient's allergies indicates:  No Known Allergies    Family History    None       Tobacco Use    Smoking status: Former     Types: Cigarettes    Smokeless tobacco: Never   Substance and Sexual Activity    Alcohol use: Never    Drug use: Never    Sexual activity: Not Currently      Review of Systems  Objective:     Vital Signs (Most Recent):  Temp: 97.8 °F (36.6 °C) (05/02/25 1123)  Pulse: 79 (05/02/25 1130)  Resp: 15 (05/02/25 1130)  BP: 128/67 (05/02/25 1130)  SpO2: (!) 90 % (05/02/25 1130) Vital Signs (24h Range):  Temp:  [97.8 °F (36.6 °C)-98.4 °F (36.9 °C)] 97.8 °F  (36.6 °C)  Pulse:  [64-91] 79  Resp:  [13-31] 15  SpO2:  [89 %-99 %] 90 %  BP: ()/(35-86) 128/67   Weight: 89.9 kg (198 lb 3.1 oz)  Body mass index is 28.44 kg/m².      Intake/Output Summary (Last 24 hours) at 5/2/2025 1753  Last data filed at 5/2/2025 1615  Gross per 24 hour   Intake 650.95 ml   Output 4475 ml   Net -3824.05 ml          Physical Exam  Constitutional:       General: He is in acute distress.      Appearance: He is normal weight. He is ill-appearing. He is not diaphoretic.   HENT:      Head: Normocephalic and atraumatic.      Nose: No congestion or rhinorrhea.      Mouth/Throat:      Mouth: Mucous membranes are moist.   Cardiovascular:      Rate and Rhythm: Normal rate and regular rhythm.      Pulses: Normal pulses.      Heart sounds: Normal heart sounds.   Pulmonary:      Effort: Pulmonary effort is normal. No respiratory distress.      Breath sounds: Normal breath sounds. No stridor. No wheezing, rhonchi or rales.   Chest:      Chest wall: No tenderness.   Abdominal:      General: Abdomen is flat.   Musculoskeletal:      Cervical back: Normal range of motion. No rigidity.      Right lower leg: No edema.      Left lower leg: No edema.   Skin:     General: Skin is warm.      Findings: No erythema.   Neurological:      General: No focal deficit present.      Mental Status: He is disoriented.      Comments: Patient disoriented, delirious.  Moving all 4 extremities equally with power 5/5.  No focal neurological deficits noted.            Vents:  Oxygen Concentration (%): 32 (05/02/25 0902)  Lines/Drains/Airways       Drain  Duration                  Urethral Catheter 05/01/25 1545 1 day              Peripheral Intravenous Line  Duration                  Peripheral IV - Single Lumen 05/01/25 1545 20 G Right Antecubital 1 day                  Significant Labs:    CBC/Anemia Profile:  Recent Labs   Lab 05/01/25  1059 05/02/25  0203   WBC 22.86* 14.65*   HGB 11.9* 10.2*   HCT 37.4* 33.0*    266    MCV 91.2 91.4   RDW 14.0 13.5        Chemistries:  Recent Labs   Lab 05/01/25  1040 05/01/25  1059 05/01/25  1320 05/02/25  0203 05/02/25  0848 05/02/25  1015   NA  --  136   < > 139 142 143   K  --  7.2*   < > 4.6 4.3 4.3   CL  --  109*   < > 106 104 104   CO2  --  13*   < > 18* 24 24   BUN  --  80*   < > 75* 72* 71*   CREATININE  --  5.16*   < > 4.38* 4.07* 3.99*   CALCIUM  --  9.3   < > 9.0 8.9 9.0   ALBUMIN  --  3.9  --   --   --   --    PROT  --  7.9*  --   --   --   --    BILITOT  --  0.6  --   --   --   --    ALKPHOS  --  113  --   --   --   --    ALT  --  29  --   --   --   --    AST  --  72*  --   --   --   --    MG  --  3.3*  --   --   --   --    PHOS 6.8*  --   --   --   --   --     < > = values in this interval not displayed.       All pertinent labs within the past 24 hours have been reviewed.    Significant Imaging: I have reviewed all pertinent imaging results/findings within the past 24 hours.    ABG  Recent Labs   Lab 05/01/25  1808   PH 7.34*   PO2 82*   PCO2 38   HCO3 20.5*     Assessment/Plan:     Neuro  Delirium  Patient delirious on admission, likely in the setting of metabolic encephalopathy.  No focal neurological deficits.  Given benzodiazepines on route for agitation by EMS/referring hospital.  We will favor frequent reorientation by staff and family members at bedside and maximize nonpharmacological measures.  5/2/25-significant improvement in patient's mental status with frequent regurgitation by family and staff at bedside.    Cardiac/Vascular  Elevated troponin  Elevated troponin at outside hospital.  Reviewed EKG with no evidence of ST-elevation.  Likely in the setting of acute renal failure.  Patient with no evidence of chest pain.    Shock  Shock resolved, lactic acidosis improved with volume resuscitation.  Likely hypoperfusion.    Renal/  * Hyperkalemia  Critical hyperkalemia, causing bradycardia and possible syncope on admission.  Please refer to acute renal failure for  further details.    Metabolic acidosis  Severe lactic acidosis resolved.    Acute renal failure  Patient with acute renal failure, complicated by severe hyperkalemia.  History of renal mass in the past.  Patient does not want to pursue aggressive/additional treatment.      Appreciate Nephrology consultation and recommendation     CT with evidence of renal mass.  Patient and family do not want to pursue any additional workup or treatment for this or the thoracic and abdominal aortic dilations.  I confirmed this once again at bedside with the patient and the patient's wife and they were in agreement and did not want additional treatment/testing/interventions for workup of the renal mass or thoracic/abdominal aortic aneurysmal dilations.    ID  Severe sepsis  Markedly improved, continue antibiotics and deescalate rapidly based on clinical stability and cultures.    Endocrine  Diabetes mellitus  On insulin sliding scale         Ruddy Montana MD  Critical Care Medicine  [facility]

## 2025-05-02 NOTE — PLAN OF CARE
[facility]  Initial Discharge Assessment       Primary Care Provider: No, Primary Doctor    Admission Diagnosis: ARF (acute renal failure) [N17.9]  Hyperkalemia [E87.5]    Admission Date: 5/1/2025  Expected Discharge Date:     Transition of Care Barriers: None    Payor: [employer] [facility] / Plan: BCBS ALL OUT OF STATE / Product Type: PPO /     Extended Emergency Contact Information  Primary Emergency Contact: Nevin Goddard  Address: [address]  Mobile Phone: [phone]  Relation: Spouse  Preferred language: English   needed? No    Discharge Plan A: Home with family  Discharge Plan B: Home with family, Home Health, Hospice/home, Long-term acute care facility (LTAC), Rehab, Skilled Nursing Facility      [place] [id number]  Phone: [phone] Fax: [phone]      Initial Assessment (most recent)       Adult Discharge Assessment - 05/02/25 1043          Discharge Assessment    Assessment Type Discharge Planning Assessment     Confirmed/corrected address, phone number and insurance Yes     Confirmed Demographics Correct on Facesheet     Source of Information family     People in Home spouse     Do you expect to return to your current living situation? Yes     Do you have help at home or someone to help you manage your care at home? Yes     Who are your caregiver(s) and their phone number(s)? Lizet Goddard, wife, [phone]     Prior to hospitilization cognitive status: Unable to Assess     Current cognitive status: Unable to Assess     Walking or Climbing Stairs Difficulty yes     Walking or Climbing Stairs ambulation difficulty, requires equipment     Mobility Management wc, rw     Dressing/Bathing Difficulty no     Home Accessibility wheelchair accessible     Home Layout Able to live on 1st floor     Equipment Currently Used at  Home walker, rolling;wheelchair;shower chair;bedside commode;nebulizer     Readmission within 30 days? No     Patient currently being followed by outpatient case management? No     Do you currently have service(s) that help you manage your care at home? No     Do you take prescription medications? Yes     Do you have prescription coverage? Yes     Coverage BCBS [place]/ Medicare Part A     Do you have any problems affording any of your prescribed medications? No     Is the patient taking medications as prescribed? yes     Who is going to help you get home at discharge? Lizet Goddard, wife, [phone]     How do you get to doctors appointments? family or friend will provide     Are you on dialysis? No     Do you take coumadin? No     Discharge Plan A Home with family     Discharge Plan B Home with family;Home Health;Hospice/home;Long-term acute care facility (LTAC);Rehab;Skilled Nursing Facility     DME Needed Upon Discharge  none     Discharge Plan discussed with: Spouse/sig other     Name(s) and Number(s) Lizet Goddard, wife, [phone]     Transition of Care Barriers None        Physical Activity    On average, how many days per week do you engage in moderate to strenuous exercise (like a brisk walk)? 0 days     On average, how many minutes do you engage in exercise at this level? 0 min        Financial Resource Strain    How hard is it for you to pay for the very basics like food, housing, medical care, and heating? Not hard at all        Housing Stability    In the last 12 months, was there a time when you were not able to pay the mortgage or rent on time? No     At any time in the past 12 months, were you homeless or living in a shelter (including now)? No        Transportation Needs    In the past 12 months, has lack of transportation kept you from medical appointments or from getting medications? No     In the past 12 months, has lack of transportation kept you from meetings, work, or from getting things needed  for daily living? No        Food Insecurity    Within the past 12 months, you worried that your food would run out before you got the money to buy more. Never true     Within the past 12 months, the food you bought just didn't last and you didn't have money to get more. Never true        Stress    Do you feel stress - tense, restless, nervous, or anxious, or unable to sleep at night because your mind is troubled all the time - these days? Not at all        Social Isolation    How often do you feel lonely or isolated from those around you?  Never        Alcohol Use    Q1: How often do you have a drink containing alcohol? Never     Q2: How many drinks containing alcohol do you have on a typical day when you are drinking? Patient does not drink     Q3: How often do you have six or more drinks on one occasion? Never        Utilities    In the past 12 months has the electric, gas, oil, or water company threatened to shut off services in your home? No        Health Literacy    How often do you need to have someone help you when you read instructions, pamphlets, or other written material from your doctor or pharmacy? Rarely        OTHER    Name(s) of People in Home Lizet Gdodard, wife, [phone]                   SS spoke with pt's family at bedside. Pt lives at home with his wife and plans to return when medically ready for dc. Pt has required dme. Pt not current with . SDOH complete. IM obtained. Ss following

## 2025-05-03 PROBLEM — R41.0 DELIRIUM: Status: RESOLVED | Noted: 2025-05-01 | Resolved: 2025-05-03

## 2025-05-03 PROBLEM — R57.9 SHOCK: Status: RESOLVED | Noted: 2025-05-01 | Resolved: 2025-05-03

## 2025-05-03 LAB
ANION GAP SERPL CALCULATED.3IONS-SCNC: 16 MMOL/L (ref 7–16)
ANION GAP SERPL CALCULATED.3IONS-SCNC: 18 MMOL/L (ref 7–16)
ANION GAP SERPL CALCULATED.3IONS-SCNC: 18 MMOL/L (ref 7–16)
ANION GAP SERPL CALCULATED.3IONS-SCNC: 20 MMOL/L (ref 7–16)
ANION GAP SERPL CALCULATED.3IONS-SCNC: 21 MMOL/L (ref 7–16)
AORTIC VALVE CUSP SEPERATION: 2.49 CM
APICAL FOUR CHAMBER EJECTION FRACTION: 49 %
ASCENDING AORTA: 3 CM
AV INDEX (PROSTH): 0.61
AV MEAN GRADIENT: 6 MMHG
AV PEAK GRADIENT: 12 MMHG
AV VALVE AREA BY VELOCITY RATIO: 2 CM²
AV VALVE AREA: 2.1 CM²
AV VELOCITY RATIO: 0.59
BASOPHILS # BLD AUTO: 0.07 K/UL (ref 0–0.2)
BASOPHILS NFR BLD AUTO: 0.5 % (ref 0–1)
BSA FOR ECHO PROCEDURE: 2.23 M2
BUN SERPL-MCNC: 66 MG/DL (ref 8–26)
BUN SERPL-MCNC: 67 MG/DL (ref 8–26)
BUN SERPL-MCNC: 68 MG/DL (ref 8–26)
BUN SERPL-MCNC: 68 MG/DL (ref 8–26)
BUN SERPL-MCNC: 69 MG/DL (ref 8–26)
BUN/CREAT SERPL: 17 (ref 6–20)
BUN/CREAT SERPL: 20 (ref 6–20)
BUN/CREAT SERPL: 21 (ref 6–20)
CALCIUM SERPL-MCNC: 8.6 MG/DL (ref 8.8–10)
CALCIUM SERPL-MCNC: 8.7 MG/DL (ref 8.8–10)
CALCIUM SERPL-MCNC: 8.8 MG/DL (ref 8.8–10)
CHLORIDE SERPL-SCNC: 101 MMOL/L (ref 98–107)
CHLORIDE SERPL-SCNC: 103 MMOL/L (ref 98–107)
CO2 SERPL-SCNC: 21 MMOL/L (ref 23–31)
CO2 SERPL-SCNC: 22 MMOL/L (ref 23–31)
CO2 SERPL-SCNC: 23 MMOL/L (ref 23–31)
CO2 SERPL-SCNC: 24 MMOL/L (ref 23–31)
CO2 SERPL-SCNC: 24 MMOL/L (ref 23–31)
CREAT SERPL-MCNC: 3.34 MG/DL (ref 0.72–1.25)
CREAT SERPL-MCNC: 3.35 MG/DL (ref 0.72–1.25)
CREAT SERPL-MCNC: 3.39 MG/DL (ref 0.72–1.25)
CREAT SERPL-MCNC: 3.47 MG/DL (ref 0.72–1.25)
CREAT SERPL-MCNC: 3.79 MG/DL (ref 0.72–1.25)
CV ECHO LV RWT: 0.48 CM
DIFFERENTIAL METHOD BLD: ABNORMAL
DOP CALC AO PEAK VEL: 1.7 M/S
DOP CALC AO VTI: 33.4 CM
DOP CALC LVOT AREA: 3.5 CM2
DOP CALC LVOT DIAMETER: 2.1 CM
DOP CALC LVOT PEAK VEL: 1 M/S
DOP CALC LVOT STROKE VOLUME: 70.6 CM3
DOP CALCLVOT PEAK VEL VTI: 20.4 CM
ECHO LV POSTERIOR WALL: 1.1 CM (ref 0.6–1.1)
EGFR (NO RACE VARIABLE) (RUSH/TITUS): 15 ML/MIN/1.73M2
EGFR (NO RACE VARIABLE) (RUSH/TITUS): 17 ML/MIN/1.73M2
EOSINOPHIL # BLD AUTO: 0.06 K/UL (ref 0–0.5)
EOSINOPHIL NFR BLD AUTO: 0.4 % (ref 1–4)
ERYTHROCYTE [DISTWIDTH] IN BLOOD BY AUTOMATED COUNT: 13.3 % (ref 11.5–14.5)
FRACTIONAL SHORTENING: 32.6 % (ref 28–44)
GLUCOSE SERPL-MCNC: 120 MG/DL (ref 70–105)
GLUCOSE SERPL-MCNC: 127 MG/DL (ref 70–105)
GLUCOSE SERPL-MCNC: 173 MG/DL (ref 82–115)
GLUCOSE SERPL-MCNC: 187 MG/DL (ref 70–105)
GLUCOSE SERPL-MCNC: 89 MG/DL (ref 70–105)
GLUCOSE SERPL-MCNC: 91 MG/DL (ref 82–115)
GLUCOSE SERPL-MCNC: 96 MG/DL (ref 82–115)
GLUCOSE SERPL-MCNC: 96 MG/DL (ref 82–115)
GLUCOSE SERPL-MCNC: 97 MG/DL (ref 82–115)
HCT VFR BLD AUTO: 37.5 % (ref 40–54)
HGB BLD-MCNC: 11.9 G/DL (ref 13.5–18)
IMM GRANULOCYTES # BLD AUTO: 0.09 K/UL (ref 0–0.04)
IMM GRANULOCYTES NFR BLD: 0.7 % (ref 0–0.4)
INTERVENTRICULAR SEPTUM: 1.2 CM (ref 0.6–1.1)
IVC DIAMETER: 1.36 CM
LEFT ATRIUM AREA SYSTOLIC (APICAL 4 CHAMBER): 21.64 CM2
LEFT ATRIUM SIZE: 3.4 CM
LEFT INTERNAL DIMENSION IN SYSTOLE: 3.1 CM (ref 2.1–4)
LEFT VENTRICLE DIASTOLIC VOLUME INDEX: 51.44 ML/M2
LEFT VENTRICLE DIASTOLIC VOLUME: 107 ML
LEFT VENTRICLE END DIASTOLIC VOLUME APICAL 4 CHAMBER: 72.32 ML
LEFT VENTRICLE END SYSTOLIC VOLUME APICAL 4 CHAMBER: 67.31 ML
LEFT VENTRICLE MASS INDEX: 92.8 G/M2
LEFT VENTRICLE SYSTOLIC VOLUME INDEX: 18.8 ML/M2
LEFT VENTRICLE SYSTOLIC VOLUME: 39 ML
LEFT VENTRICULAR INTERNAL DIMENSION IN DIASTOLE: 4.6 CM (ref 3.5–6)
LEFT VENTRICULAR MASS: 192.9 G
LVED V (TEICH): 106.72 ML
LVES V (TEICH): 39.22 ML
LVOT MG: 1.86 MMHG
LVOT MV: 0.64 CM/S
LYMPHOCYTES # BLD AUTO: 2.15 K/UL (ref 1–4.8)
LYMPHOCYTES NFR BLD AUTO: 15.6 % (ref 27–41)
MCH RBC QN AUTO: 28.4 PG (ref 27–31)
MCHC RBC AUTO-ENTMCNC: 31.7 G/DL (ref 32–36)
MCV RBC AUTO: 89.5 FL (ref 80–96)
MONOCYTES # BLD AUTO: 1.65 K/UL (ref 0–0.8)
MONOCYTES NFR BLD AUTO: 12 % (ref 2–6)
MPC BLD CALC-MCNC: 10.2 FL (ref 9.4–12.4)
NEUTROPHILS # BLD AUTO: 9.74 K/UL (ref 1.8–7.7)
NEUTROPHILS NFR BLD AUTO: 70.8 % (ref 53–65)
NRBC # BLD AUTO: 0 X10E3/UL
NRBC, AUTO (.00): 0 %
OHS CV RV/LV RATIO: 1.04 CM
PISA TR MAX VEL: 2.7 M/S
PLATELET # BLD AUTO: 240 K/UL (ref 150–400)
POTASSIUM SERPL-SCNC: 3.8 MMOL/L (ref 3.5–5.1)
POTASSIUM SERPL-SCNC: 4 MMOL/L (ref 3.5–5.1)
POTASSIUM SERPL-SCNC: 4 MMOL/L (ref 3.5–5.1)
POTASSIUM SERPL-SCNC: 4.1 MMOL/L (ref 3.5–5.1)
POTASSIUM SERPL-SCNC: 4.2 MMOL/L (ref 3.5–5.1)
PV PEAK GRADIENT: 5 MMHG
PV PEAK VELOCITY: 1.15 M/S
RA MAJOR: 4.81 CM
RA PRESSURE ESTIMATED: 3 MMHG
RBC # BLD AUTO: 4.19 M/UL (ref 4.6–6.2)
RIGHT VENTRICLE DIASTOLIC BASEL DIMENSION: 4.8 CM
RIGHT VENTRICLE DIASTOLIC LENGTH: 4.6 CM
RIGHT VENTRICLE DIASTOLIC MID DIMENSION: 3.6 CM
RIGHT VENTRICULAR LENGTH IN DIASTOLE (APICAL 4-CHAMBER VIEW): 4.56 CM
RV MID DIAMA: 3.61 CM
RV TB RVSP: 6 MMHG
SINUS: 3.5 CM
SODIUM SERPL-SCNC: 139 MMOL/L (ref 136–145)
SODIUM SERPL-SCNC: 139 MMOL/L (ref 136–145)
SODIUM SERPL-SCNC: 140 MMOL/L (ref 136–145)
SODIUM SERPL-SCNC: 141 MMOL/L (ref 136–145)
SODIUM SERPL-SCNC: 141 MMOL/L (ref 136–145)
TDI LATERAL: 0.12 M/S
TDI SEPTAL: 0.1 M/S
TDI: 0.11 M/S
TR MAX PG: 29 MMHG
TRICUSPID ANNULAR PLANE SYSTOLIC EXCURSION: 2.2 CM
TV REST PULMONARY ARTERY PRESSURE: 32 MMHG
WBC # BLD AUTO: 13.76 K/UL (ref 4.5–11)
Z-SCORE OF LEFT VENTRICULAR DIMENSION IN END DIASTOLE: -3.25
Z-SCORE OF LEFT VENTRICULAR DIMENSION IN END SYSTOLE: -1.81

## 2025-05-03 PROCEDURE — 63600175 PHARM REV CODE 636 W HCPCS: Performed by: STUDENT IN AN ORGANIZED HEALTH CARE EDUCATION/TRAINING PROGRAM

## 2025-05-03 PROCEDURE — 99233 SBSQ HOSP IP/OBS HIGH 50: CPT | Mod: ,,, | Performed by: FAMILY MEDICINE

## 2025-05-03 PROCEDURE — 96372 THER/PROPH/DIAG INJ SC/IM: CPT

## 2025-05-03 PROCEDURE — 80048 BASIC METABOLIC PNL TOTAL CA: CPT | Performed by: HOSPITALIST

## 2025-05-03 PROCEDURE — 82962 GLUCOSE BLOOD TEST: CPT

## 2025-05-03 PROCEDURE — 25000242 PHARM REV CODE 250 ALT 637 W/ HCPCS: Performed by: HOSPITALIST

## 2025-05-03 PROCEDURE — 80048 BASIC METABOLIC PNL TOTAL CA: CPT | Performed by: STUDENT IN AN ORGANIZED HEALTH CARE EDUCATION/TRAINING PROGRAM

## 2025-05-03 PROCEDURE — 36415 COLL VENOUS BLD VENIPUNCTURE: CPT | Performed by: STUDENT IN AN ORGANIZED HEALTH CARE EDUCATION/TRAINING PROGRAM

## 2025-05-03 PROCEDURE — 11000001 HC ACUTE MED/SURG PRIVATE ROOM

## 2025-05-03 PROCEDURE — 36415 COLL VENOUS BLD VENIPUNCTURE: CPT | Performed by: HOSPITALIST

## 2025-05-03 PROCEDURE — 85025 COMPLETE CBC W/AUTO DIFF WBC: CPT | Performed by: STUDENT IN AN ORGANIZED HEALTH CARE EDUCATION/TRAINING PROGRAM

## 2025-05-03 PROCEDURE — 27000221 HC OXYGEN, UP TO 24 HOURS

## 2025-05-03 PROCEDURE — 94640 AIRWAY INHALATION TREATMENT: CPT

## 2025-05-03 RX ADMIN — INSULIN ASPART 1 UNITS: 100 INJECTION, SOLUTION INTRAVENOUS; SUBCUTANEOUS at 09:05

## 2025-05-03 RX ADMIN — HEPARIN SODIUM 5000 UNITS: 5000 INJECTION, SOLUTION INTRAVENOUS; SUBCUTANEOUS at 06:05

## 2025-05-03 RX ADMIN — CEFEPIME 1 G: 1 INJECTION, POWDER, FOR SOLUTION INTRAMUSCULAR; INTRAVENOUS at 06:05

## 2025-05-03 RX ADMIN — MUPIROCIN: 20 OINTMENT TOPICAL at 09:05

## 2025-05-03 RX ADMIN — MUPIROCIN: 20 OINTMENT TOPICAL at 08:05

## 2025-05-03 RX ADMIN — HEPARIN SODIUM 5000 UNITS: 5000 INJECTION, SOLUTION INTRAVENOUS; SUBCUTANEOUS at 01:05

## 2025-05-03 RX ADMIN — CEFEPIME 1 G: 1 INJECTION, POWDER, FOR SOLUTION INTRAMUSCULAR; INTRAVENOUS at 04:05

## 2025-05-03 RX ADMIN — ALBUTEROL SULFATE 2.5 MG: 2.5 SOLUTION RESPIRATORY (INHALATION) at 12:05

## 2025-05-03 RX ADMIN — HEPARIN SODIUM 5000 UNITS: 5000 INJECTION, SOLUTION INTRAVENOUS; SUBCUTANEOUS at 09:05

## 2025-05-03 NOTE — ASSESSMENT & PLAN NOTE
Hyperkalemia is likely due to LATONIA.The patients most recent potassium results are listed below.  Recent Labs     05/02/25  2318 05/03/25  0455 05/03/25  0811   K 4.1 4.2 4.0     Plan  - Monitor for arrhythmias with EKG and/or continuous telemetry.   - Treat the hyperkalemia with PRN therapy.   - Monitor potassium: Daily  - The patient's hyperkalemia is improving

## 2025-05-03 NOTE — PLAN OF CARE
Problem: Diabetes Comorbidity  Goal: Blood Glucose Level Within Targeted Range  Outcome: Progressing     Problem: Adult Inpatient Plan of Care  Goal: Plan of Care Review  Outcome: Progressing  Goal: Patient-Specific Goal (Individualized)  Outcome: Progressing  Goal: Absence of Hospital-Acquired Illness or Injury  Outcome: Progressing  Goal: Optimal Comfort and Wellbeing  Outcome: Progressing  Goal: Readiness for Transition of Care  Outcome: Progressing     Problem: Fall Injury Risk  Goal: Absence of Fall and Fall-Related Injury  Outcome: Progressing     Problem: Restraint, Nonviolent  Goal: Absence of Harm or Injury  Outcome: Progressing     Problem: Skin Injury Risk Increased  Goal: Skin Health and Integrity  Outcome: Progressing     Problem: Gas Exchange Impaired  Goal: Optimal Gas Exchange  Outcome: Progressing     Problem: Sepsis/Septic Shock  Goal: Optimal Coping  Outcome: Progressing  Goal: Absence of Bleeding  Outcome: Progressing  Goal: Blood Glucose Level Within Targeted Range  Outcome: Progressing  Goal: Absence of Infection Signs and Symptoms  Outcome: Progressing  Goal: Optimal Nutrition Intake  Outcome: Progressing     Problem: Acute Kidney Injury/Impairment  Goal: Fluid and Electrolyte Balance  Outcome: Progressing  Goal: Improved Oral Intake  Outcome: Progressing  Goal: Effective Renal Function  Outcome: Progressing     Problem: Infection  Goal: Absence of Infection Signs and Symptoms  Outcome: Progressing

## 2025-05-03 NOTE — SUBJECTIVE & OBJECTIVE
Interval History:     No significant events overnight, no new complaints or concerns. Patient mildly confused but alert and talkative. He and wife are eager to discharge home however continues to have new oxygen requirement >4L while at rest. Continue to treat underlying process and will consult case management for home oxygen.       Objective:     Vital Signs (Most Recent):  Temp: 98.8 °F (37.1 °C) (05/03/25 1115)  Pulse: 72 (05/03/25 1115)  Resp: 18 (05/03/25 1115)  BP: 136/76 (05/03/25 1115)  SpO2: 96 % (05/03/25 1115) Vital Signs (24h Range):  Temp:  [97.7 °F (36.5 °C)-98.8 °F (37.1 °C)] 98.8 °F (37.1 °C)  Pulse:  [60-95] 72  Resp:  [11-34] 18  SpO2:  [83 %-97 %] 96 %  BP: ()/(48-89) 136/76     Weight: 89.9 kg (198 lb 3.1 oz)  Body mass index is 28.44 kg/m².    Intake/Output Summary (Last 24 hours) at 5/3/2025 1203  Last data filed at 5/3/2025 0927  Gross per 24 hour   Intake 240 ml   Output 2100 ml   Net -1860 ml         Physical Exam  Constitutional:       General: He is not in acute distress.     Appearance: He is ill-appearing.   HENT:      Head: Normocephalic and atraumatic.      Nose: Nose normal.   Eyes:      Conjunctiva/sclera: Conjunctivae normal.      Pupils: Pupils are equal, round, and reactive to light.   Pulmonary:      Effort: Pulmonary effort is normal. No respiratory distress.   Musculoskeletal:      Cervical back: No rigidity.   Skin:     Coloration: Skin is not jaundiced or pale.      Findings: No rash.   Neurological:      Mental Status: He is alert. He is disoriented.   Psychiatric:         Behavior: Behavior normal.         Thought Content: Thought content normal.               Significant Labs: All pertinent labs within the past 24 hours have been reviewed.    Significant Imaging: I have reviewed all pertinent imaging results/findings within the past 24 hours.

## 2025-05-03 NOTE — HPI
This is an 85-year-old gentleman with past medical history significant for renal lesions (in 2023 the patient had CT abdomen pelvis performed which showed bilateral renal cysts versus mass).  In June of 2023 he had ultrasound kidney as well that showed a solid mass with recommendations for CT or MRI with renal mass protocol in the absence of hydronephrosis.      History was obtained from chart review and speaking with the patient's wife.  In the past, the patient has also reportedly been told that he may have renal malignancy and a nephrectomy was recommended however the patient did not want to get a nephrectomy performed .  He does have a history of prostate cancer in the remote past.     The patient has had blood cultures x2 done at the referring facility.     Per the patient's wife, he has been confused and not eating/drinking over the last 2-3 days.  Today, the patient had a syncopal event for a few sec and has after that the patient's wife drove him to the hospital.  He presented to the hospital at which time he was found to be markedly bradycardic.  He was treated with membrane stabilization medication, found to have hyperkalemia and a decision was made to transfer the patient over to the ICU for potential hemodialysis.  On repeat check, the patient's potassium had improved to 6.1.  He has yet another repeat check pending.  He has evidence of marked leukocytosis, hemoglobin at 11.9 and elevated troponin 65.6 without evidence of chest pain.       The patient arrived to [facility] delirious (received lorazepam on route).  He was hemodynamically stable, continued on epinephrine drip.  I had a very detailed conversation with the patient's wife at bedside.  She shared with me that her  has stated multiple times that he would like to be DNR/DNI and does not want any aggressive measures.  I offered hemodialysis, especially if medical management did not work.  She tells me that based on her 's wishes as well  as her decision as the patient's healthcare proxy, they do not wish to proceed with hemodialysis.  They are aware of the potential implications of not getting hemodialysis done especially in the setting of worsening hyperkalemia which may include fatal arrhythmia, cardiac arrest and/or death.

## 2025-05-03 NOTE — ASSESSMENT & PLAN NOTE
LATONIA is likely due to sepsis +/- obstructive uropathy. Of note, patient does have a renal mass suspicious for malignancy. Patient and family decline further evaluation or intervention at this time.     Baseline creatinine is <2. Most recent creatinine and eGFR are listed below.  Recent Labs     05/02/25  2318 05/03/25  0455 05/03/25  0811   CREATININE 3.81* 3.39* 3.34*   EGFRNORACEVR 15* 17* 17*      Plan  - LATONIA is improving  - Avoid nephrotoxins and renally dose meds for GFR listed above  - Monitor urine output, serial BMP, and adjust therapy as needed

## 2025-05-03 NOTE — ASSESSMENT & PLAN NOTE
"Patient's FSGs are controlled on current medication regimen.  Last A1c reviewed-   Lab Results   Component Value Date    HGBA1C 5.8 05/01/2025     Most recent fingerstick glucose reviewed- No results for input(s): "POCTGLUCOSE" in the last 24 hours.  Current correctional scale  Medium  Maintain anti-hyperglycemic dose as follows-   Antihyperglycemics (From admission, onward)      Start     Stop Route Frequency Ordered    05/01/25 1755  insulin aspart U-100 injection 0-10 Units         -- SubQ Before meals & nightly PRN 05/01/25 1655          Hold Oral hypoglycemics while patient is in the hospital.  "

## 2025-05-03 NOTE — PROGRESS NOTES
[facility] Hospital Medicine  Progress Note    Patient Name: Darron Goddard  MRN: [id number]  Patient Class: IP- Inpatient   Admission Date: 5/1/2025  Length of Stay: 2 days  Attending Physician: Risa oRb DO  Primary Care Provider: Jade, Primary Doctor        Subjective     Principal Problem:Hyperkalemia        HPI:  This is an 85-year-old gentleman with past medical history significant for renal lesions (in 2023 the patient had CT abdomen pelvis performed which showed bilateral renal cysts versus mass).  In June of 2023 he had ultrasound kidney as well that showed a solid mass with recommendations for CT or MRI with renal mass protocol in the absence of hydronephrosis.      History was obtained from chart review and speaking with the patient's wife.  In the past, the patient has also reportedly been told that he may have renal malignancy and a nephrectomy was recommended however the patient did not want to get a nephrectomy performed .  He does have a history of prostate cancer in the remote past.     The patient has had blood cultures x2 done at the referring facility.     Per the patient's wife, he has been confused and not eating/drinking over the last 2-3 days.  Today, the patient had a syncopal event for a few sec and has after that the patient's wife drove him to the hospital.  He presented to the hospital at which time he was found to be markedly bradycardic.  He was treated with membrane stabilization medication, found to have hyperkalemia and a decision was made to transfer the patient over to the ICU for potential hemodialysis.  On repeat check, the patient's potassium had improved to 6.1.  He has yet another repeat check pending.  He has evidence of marked leukocytosis, hemoglobin at 11.9 and elevated troponin 65.6 without evidence of chest pain.       The patient arrived to [facility] delirious (received lorazepam on route).  He was hemodynamically stable, continued on  epinephrine drip.  I had a very detailed conversation with the patient's wife at bedside.  She shared with me that her  has stated multiple times that he would like to be DNR/DNI and does not want any aggressive measures.  I offered hemodialysis, especially if medical management did not work.  She tells me that based on her 's wishes as well as her decision as the patient's healthcare proxy, they do not wish to proceed with hemodialysis.  They are aware of the potential implications of not getting hemodialysis done especially in the setting of worsening hyperkalemia which may include fatal arrhythmia, cardiac arrest and/or death.    Overview/Hospital Course:  No notes on file    Interval History:     No significant events overnight, no new complaints or concerns. Patient mildly confused but alert and talkative. He and wife are eager to discharge home however continues to have new oxygen requirement >4L while at rest. Continue to treat underlying process and will consult case management for home oxygen.       Objective:     Vital Signs (Most Recent):  Temp: 98.8 °F (37.1 °C) (05/03/25 1115)  Pulse: 72 (05/03/25 1115)  Resp: 18 (05/03/25 1115)  BP: 136/76 (05/03/25 1115)  SpO2: 96 % (05/03/25 1115) Vital Signs (24h Range):  Temp:  [97.7 °F (36.5 °C)-98.8 °F (37.1 °C)] 98.8 °F (37.1 °C)  Pulse:  [60-95] 72  Resp:  [11-34] 18  SpO2:  [83 %-97 %] 96 %  BP: ()/(48-89) 136/76     Weight: 89.9 kg (198 lb 3.1 oz)  Body mass index is 28.44 kg/m².    Intake/Output Summary (Last 24 hours) at 5/3/2025 1203  Last data filed at 5/3/2025 0927  Gross per 24 hour   Intake 240 ml   Output 2100 ml   Net -1860 ml         Physical Exam  Constitutional:       General: He is not in acute distress.     Appearance: He is ill-appearing.   HENT:      Head: Normocephalic and atraumatic.      Nose: Nose normal.   Eyes:      Conjunctiva/sclera: Conjunctivae normal.      Pupils: Pupils are equal, round, and reactive to light.  "  Pulmonary:      Effort: Pulmonary effort is normal. No respiratory distress.   Musculoskeletal:      Cervical back: No rigidity.   Skin:     Coloration: Skin is not jaundiced or pale.      Findings: No rash.   Neurological:      Mental Status: He is alert. He is disoriented.   Psychiatric:         Behavior: Behavior normal.         Thought Content: Thought content normal.               Significant Labs: All pertinent labs within the past 24 hours have been reviewed.    Significant Imaging: I have reviewed all pertinent imaging results/findings within the past 24 hours.      Assessment & Plan  Hyperkalemia  Hyperkalemia is likely due to LATONIA.The patients most recent potassium results are listed below.  Recent Labs     05/02/25 2318 05/03/25  0455 05/03/25  0811   K 4.1 4.2 4.0     Plan  - Monitor for arrhythmias with EKG and/or continuous telemetry.   - Treat the hyperkalemia with PRN therapy.   - Monitor potassium: Daily  - The patient's hyperkalemia is improving          Acute renal failure  LATONIA is likely due to sepsis +/- obstructive uropathy. Of note, patient does have a renal mass suspicious for malignancy. Patient and family decline further evaluation or intervention at this time.     Baseline creatinine is <2. Most recent creatinine and eGFR are listed below.  Recent Labs     05/02/25 2318 05/03/25  0455 05/03/25  0811   CREATININE 3.81* 3.39* 3.34*   EGFRNORACEVR 15* 17* 17*      Plan  - LATONIA is improving  - Avoid nephrotoxins and renally dose meds for GFR listed above  - Monitor urine output, serial BMP, and adjust therapy as needed    Metabolic acidosis  Resolved. Secondary to lactic acidosis and acute renal failure.     Diabetes mellitus  Patient's FSGs are controlled on current medication regimen.  Last A1c reviewed-   Lab Results   Component Value Date    HGBA1C 5.8 05/01/2025     Most recent fingerstick glucose reviewed- No results for input(s): "POCTGLUCOSE" in the last 24 hours.  Current " correctional scale  Medium  Maintain anti-hyperglycemic dose as follows-   Antihyperglycemics (From admission, onward)      Start     Stop Route Frequency Ordered    05/01/25 1755  insulin aspart U-100 injection 0-10 Units         -- SubQ Before meals & nightly PRN 05/01/25 1655          Hold Oral hypoglycemics while patient is in the hospital.  Severe sepsis  This patient does have evidence of infective focus  My overall impression is sepsis.  Source: Unknown  Antibiotics given-   Antibiotics (72h ago, onward)      Start     Stop Route Frequency Ordered    05/01/25 2100  mupirocin 2 % ointment         05/06/25 2059 Nasl 2 times daily 05/01/25 1649    05/01/25 1745  ceFEPIme injection 1 g         05/06/25 1744 IV Every 12 hours (non-standard times) 05/01/25 1647          Latest lactate reviewed-  Recent Labs   Lab 05/02/25  0848   LACTATE 1.6     Organ dysfunction indicated by Acute kidney injury    Fluid challenge Ideal Body Weight- The patient is obese (BMI>30) and their ideal body weight of Ideal body weight: 73 kg (160 lb 15 oz) will be used to calculate fluid bolus of 30 ml/kg.     Post- resuscitation assessment Yes - I attest a sepsis perfusion exam was performed within 6 hours of sepsis, severe sepsis, or septic shock presentation, following fluid resuscitation.      Will Not start Pressors- Levophed for MAP of 65  Source control achieved by: antibiotic    Blood cultures no growth to date.   Elevated troponin  Elevated troponin at outside hospital. Reviewed EKG with no evidence of ST-elevation. Likely in the setting of acute renal failure. Patient with no evidence of chest pain.     VTE Risk Mitigation (From admission, onward)           Ordered     heparin (porcine) injection 5,000 Units  Every 8 hours         05/01/25 1630     IP VTE HIGH RISK PATIENT  Once         05/01/25 1630     Place sequential compression device  Until discontinued         05/01/25 1630                    Discharge Planning   PASQUALE:  5/9/2025     Code Status: DNR   Medical Readiness for Discharge Date:   Discharge Plan A: Home with family                        Risa Rob DO  Department of Hospital Medicine   [facility]

## 2025-05-03 NOTE — ASSESSMENT & PLAN NOTE
This patient does have evidence of infective focus  My overall impression is sepsis.  Source: Unknown  Antibiotics given-   Antibiotics (72h ago, onward)      Start     Stop Route Frequency Ordered    05/01/25 2100  mupirocin 2 % ointment         05/06/25 2059 Nasl 2 times daily 05/01/25 1649 05/01/25 1745  ceFEPIme injection 1 g         05/06/25 1744 IV Every 12 hours (non-standard times) 05/01/25 1647          Latest lactate reviewed-  Recent Labs   Lab 05/02/25  0848   LACTATE 1.6     Organ dysfunction indicated by Acute kidney injury    Fluid challenge Ideal Body Weight- The patient is obese (BMI>30) and their ideal body weight of Ideal body weight: 73 kg (160 lb 15 oz) will be used to calculate fluid bolus of 30 ml/kg.     Post- resuscitation assessment Yes - I attest a sepsis perfusion exam was performed within 6 hours of sepsis, severe sepsis, or septic shock presentation, following fluid resuscitation.      Will Not start Pressors- Levophed for MAP of 65  Source control achieved by: antibiotic    Blood cultures no growth to date.

## 2025-05-03 NOTE — PLAN OF CARE
Problem: Diabetes Comorbidity  Goal: Blood Glucose Level Within Targeted Range  Outcome: Progressing     Problem: Adult Inpatient Plan of Care  Goal: Plan of Care Review  Outcome: Progressing  Goal: Patient-Specific Goal (Individualized)  Outcome: Progressing  Goal: Absence of Hospital-Acquired Illness or Injury  Outcome: Progressing  Goal: Optimal Comfort and Wellbeing  Outcome: Progressing  Goal: Readiness for Transition of Care  Outcome: Progressing     Problem: Adult Inpatient Plan of Care  Goal: Patient-Specific Goal (Individualized)  Outcome: Progressing     Problem: Adult Inpatient Plan of Care  Goal: Absence of Hospital-Acquired Illness or Injury  Outcome: Progressing     Problem: Adult Inpatient Plan of Care  Goal: Optimal Comfort and Wellbeing  Outcome: Progressing     Problem: Adult Inpatient Plan of Care  Goal: Readiness for Transition of Care  Outcome: Progressing

## 2025-05-03 NOTE — NURSING
00:40- Resp. Tech told me that patient's O2 sat was decreasing on the 6L of O2 so she put him on a Venturi Mask @ 50%, but the problem is that the patient is a mouth breather and sleeps with his mouth wide open.  She instructed that patient to try to close his mouth and breathe through his nose and I went in the room and instructed him to do the same and told his wife that I was going to leave him on continuous O2 monitoring and I set the alarm to go off if he drops below 88%.    00:28- Patient just finished getting a breathing tx and Resp tech bumped him up to 6L of O2 via NC and his O2 sat increased to 90%.    00:03- Patient c/o of SOB,  Checked O2 sat and it is 85% on O2 @ 3L.  Wife asked for breathing tx.  Called Resp Tech and she will give him a PRN breathing tx.

## 2025-05-04 LAB
ANION GAP SERPL CALCULATED.3IONS-SCNC: 17 MMOL/L (ref 7–16)
ANION GAP SERPL CALCULATED.3IONS-SCNC: 18 MMOL/L (ref 7–16)
ANION GAP SERPL CALCULATED.3IONS-SCNC: 19 MMOL/L (ref 7–16)
ANION GAP SERPL CALCULATED.3IONS-SCNC: 19 MMOL/L (ref 7–16)
BASOPHILS # BLD AUTO: 0.08 K/UL (ref 0–0.2)
BASOPHILS NFR BLD AUTO: 0.7 % (ref 0–1)
BUN SERPL-MCNC: 62 MG/DL (ref 8–26)
BUN SERPL-MCNC: 63 MG/DL (ref 8–26)
BUN SERPL-MCNC: 64 MG/DL (ref 8–26)
BUN SERPL-MCNC: 65 MG/DL (ref 8–26)
BUN/CREAT SERPL: 18 (ref 6–20)
BUN/CREAT SERPL: 18 (ref 6–20)
BUN/CREAT SERPL: 20 (ref 6–20)
BUN/CREAT SERPL: 21 (ref 6–20)
CALCIUM SERPL-MCNC: 8.6 MG/DL (ref 8.8–10)
CALCIUM SERPL-MCNC: 8.7 MG/DL (ref 8.8–10)
CALCIUM SERPL-MCNC: 8.7 MG/DL (ref 8.8–10)
CALCIUM SERPL-MCNC: 8.8 MG/DL (ref 8.8–10)
CALCIUM SERPL-MCNC: 8.9 MG/DL (ref 8.8–10)
CALCIUM SERPL-MCNC: 9 MG/DL (ref 8.8–10)
CHLORIDE SERPL-SCNC: 101 MMOL/L (ref 98–107)
CHLORIDE SERPL-SCNC: 101 MMOL/L (ref 98–107)
CHLORIDE SERPL-SCNC: 102 MMOL/L (ref 98–107)
CHLORIDE SERPL-SCNC: 103 MMOL/L (ref 98–107)
CO2 SERPL-SCNC: 20 MMOL/L (ref 23–31)
CO2 SERPL-SCNC: 21 MMOL/L (ref 23–31)
CO2 SERPL-SCNC: 22 MMOL/L (ref 23–31)
CO2 SERPL-SCNC: 23 MMOL/L (ref 23–31)
CO2 SERPL-SCNC: 23 MMOL/L (ref 23–31)
CO2 SERPL-SCNC: 24 MMOL/L (ref 23–31)
CREAT SERPL-MCNC: 3.02 MG/DL (ref 0.72–1.25)
CREAT SERPL-MCNC: 3.08 MG/DL (ref 0.72–1.25)
CREAT SERPL-MCNC: 3.1 MG/DL (ref 0.72–1.25)
CREAT SERPL-MCNC: 3.11 MG/DL (ref 0.72–1.25)
CREAT SERPL-MCNC: 3.42 MG/DL (ref 0.72–1.25)
CREAT SERPL-MCNC: 3.53 MG/DL (ref 0.72–1.25)
DIFFERENTIAL METHOD BLD: ABNORMAL
EGFR (NO RACE VARIABLE) (RUSH/TITUS): 16 ML/MIN/1.73M2
EGFR (NO RACE VARIABLE) (RUSH/TITUS): 17 ML/MIN/1.73M2
EGFR (NO RACE VARIABLE) (RUSH/TITUS): 19 ML/MIN/1.73M2
EGFR (NO RACE VARIABLE) (RUSH/TITUS): 20 ML/MIN/1.73M2
EOSINOPHIL # BLD AUTO: 0.27 K/UL (ref 0–0.5)
EOSINOPHIL NFR BLD AUTO: 2.3 % (ref 1–4)
ERYTHROCYTE [DISTWIDTH] IN BLOOD BY AUTOMATED COUNT: 12.9 % (ref 11.5–14.5)
GLUCOSE SERPL-MCNC: 111 MG/DL (ref 70–105)
GLUCOSE SERPL-MCNC: 111 MG/DL (ref 82–115)
GLUCOSE SERPL-MCNC: 116 MG/DL (ref 82–115)
GLUCOSE SERPL-MCNC: 118 MG/DL (ref 82–115)
GLUCOSE SERPL-MCNC: 126 MG/DL (ref 82–115)
GLUCOSE SERPL-MCNC: 127 MG/DL (ref 70–105)
GLUCOSE SERPL-MCNC: 151 MG/DL (ref 82–115)
GLUCOSE SERPL-MCNC: 187 MG/DL (ref 82–115)
GLUCOSE SERPL-MCNC: 188 MG/DL (ref 70–105)
GLUCOSE SERPL-MCNC: 189 MG/DL (ref 70–105)
GLUCOSE SERPL-MCNC: 190 MG/DL (ref 70–105)
HCT VFR BLD AUTO: 35.8 % (ref 40–54)
HGB BLD-MCNC: 11.5 G/DL (ref 13.5–18)
IMM GRANULOCYTES # BLD AUTO: 0.12 K/UL (ref 0–0.04)
IMM GRANULOCYTES NFR BLD: 1 % (ref 0–0.4)
LYMPHOCYTES # BLD AUTO: 2.13 K/UL (ref 1–4.8)
LYMPHOCYTES NFR BLD AUTO: 18.4 % (ref 27–41)
MCH RBC QN AUTO: 28.3 PG (ref 27–31)
MCHC RBC AUTO-ENTMCNC: 32.1 G/DL (ref 32–36)
MCV RBC AUTO: 88 FL (ref 80–96)
MONOCYTES # BLD AUTO: 1.31 K/UL (ref 0–0.8)
MONOCYTES NFR BLD AUTO: 11.3 % (ref 2–6)
MPC BLD CALC-MCNC: 10 FL (ref 9.4–12.4)
NEUTROPHILS # BLD AUTO: 7.67 K/UL (ref 1.8–7.7)
NEUTROPHILS NFR BLD AUTO: 66.3 % (ref 53–65)
NRBC # BLD AUTO: 0 X10E3/UL
NRBC, AUTO (.00): 0 %
PLATELET # BLD AUTO: 269 K/UL (ref 150–400)
POTASSIUM SERPL-SCNC: 3.6 MMOL/L (ref 3.5–5.1)
POTASSIUM SERPL-SCNC: 3.7 MMOL/L (ref 3.5–5.1)
POTASSIUM SERPL-SCNC: 3.8 MMOL/L (ref 3.5–5.1)
POTASSIUM SERPL-SCNC: 3.8 MMOL/L (ref 3.5–5.1)
POTASSIUM SERPL-SCNC: 3.9 MMOL/L (ref 3.5–5.1)
POTASSIUM SERPL-SCNC: 4 MMOL/L (ref 3.5–5.1)
RBC # BLD AUTO: 4.07 M/UL (ref 4.6–6.2)
SODIUM SERPL-SCNC: 137 MMOL/L (ref 136–145)
SODIUM SERPL-SCNC: 138 MMOL/L (ref 136–145)
SODIUM SERPL-SCNC: 139 MMOL/L (ref 136–145)
SODIUM SERPL-SCNC: 139 MMOL/L (ref 136–145)
WBC # BLD AUTO: 11.58 K/UL (ref 4.5–11)

## 2025-05-04 PROCEDURE — 25000242 PHARM REV CODE 250 ALT 637 W/ HCPCS: Performed by: FAMILY MEDICINE

## 2025-05-04 PROCEDURE — 36415 COLL VENOUS BLD VENIPUNCTURE: CPT | Performed by: HOSPITALIST

## 2025-05-04 PROCEDURE — 80048 BASIC METABOLIC PNL TOTAL CA: CPT | Performed by: HOSPITALIST

## 2025-05-04 PROCEDURE — 63600175 PHARM REV CODE 636 W HCPCS: Performed by: STUDENT IN AN ORGANIZED HEALTH CARE EDUCATION/TRAINING PROGRAM

## 2025-05-04 PROCEDURE — 36415 COLL VENOUS BLD VENIPUNCTURE: CPT | Performed by: STUDENT IN AN ORGANIZED HEALTH CARE EDUCATION/TRAINING PROGRAM

## 2025-05-04 PROCEDURE — 94640 AIRWAY INHALATION TREATMENT: CPT

## 2025-05-04 PROCEDURE — 96372 THER/PROPH/DIAG INJ SC/IM: CPT

## 2025-05-04 PROCEDURE — 85025 COMPLETE CBC W/AUTO DIFF WBC: CPT | Performed by: STUDENT IN AN ORGANIZED HEALTH CARE EDUCATION/TRAINING PROGRAM

## 2025-05-04 PROCEDURE — 25000003 PHARM REV CODE 250: Performed by: HOSPITALIST

## 2025-05-04 PROCEDURE — 11000001 HC ACUTE MED/SURG PRIVATE ROOM

## 2025-05-04 PROCEDURE — 99233 SBSQ HOSP IP/OBS HIGH 50: CPT | Mod: ,,, | Performed by: FAMILY MEDICINE

## 2025-05-04 PROCEDURE — 27000221 HC OXYGEN, UP TO 24 HOURS

## 2025-05-04 PROCEDURE — 63600175 PHARM REV CODE 636 W HCPCS: Performed by: FAMILY MEDICINE

## 2025-05-04 PROCEDURE — 99900035 HC TECH TIME PER 15 MIN (STAT)

## 2025-05-04 PROCEDURE — 80048 BASIC METABOLIC PNL TOTAL CA: CPT | Performed by: STUDENT IN AN ORGANIZED HEALTH CARE EDUCATION/TRAINING PROGRAM

## 2025-05-04 PROCEDURE — 82962 GLUCOSE BLOOD TEST: CPT

## 2025-05-04 PROCEDURE — 94761 N-INVAS EAR/PLS OXIMETRY MLT: CPT

## 2025-05-04 RX ORDER — FUROSEMIDE 10 MG/ML
20 INJECTION INTRAMUSCULAR; INTRAVENOUS ONCE
Status: DISCONTINUED | OUTPATIENT
Start: 2025-05-04 | End: 2025-05-04

## 2025-05-04 RX ORDER — DOCUSATE SODIUM 100 MG/1
100 CAPSULE, LIQUID FILLED ORAL 2 TIMES DAILY PRN
Status: DISCONTINUED | OUTPATIENT
Start: 2025-05-04 | End: 2025-05-06 | Stop reason: HOSPADM

## 2025-05-04 RX ORDER — ALUMINUM HYDROXIDE, MAGNESIUM HYDROXIDE, AND SIMETHICONE 2400; 240; 2400 MG/30ML; MG/30ML; MG/30ML
30 SUSPENSION ORAL EVERY 6 HOURS PRN
Status: DISCONTINUED | OUTPATIENT
Start: 2025-05-04 | End: 2025-05-06 | Stop reason: HOSPADM

## 2025-05-04 RX ORDER — DIPHENHYDRAMINE HCL 25 MG
50 CAPSULE ORAL EVERY 6 HOURS PRN
Status: DISCONTINUED | OUTPATIENT
Start: 2025-05-04 | End: 2025-05-06 | Stop reason: HOSPADM

## 2025-05-04 RX ORDER — GUAIFENESIN AND DEXTROMETHORPHAN HYDROBROMIDE 10; 100 MG/5ML; MG/5ML
10 SYRUP ORAL EVERY 6 HOURS PRN
Status: DISCONTINUED | OUTPATIENT
Start: 2025-05-04 | End: 2025-05-06 | Stop reason: HOSPADM

## 2025-05-04 RX ORDER — ONDANSETRON HYDROCHLORIDE 2 MG/ML
8 INJECTION, SOLUTION INTRAVENOUS EVERY 6 HOURS PRN
Status: DISCONTINUED | OUTPATIENT
Start: 2025-05-04 | End: 2025-05-06 | Stop reason: HOSPADM

## 2025-05-04 RX ORDER — IPRATROPIUM BROMIDE AND ALBUTEROL SULFATE 2.5; .5 MG/3ML; MG/3ML
3 SOLUTION RESPIRATORY (INHALATION)
Status: DISCONTINUED | OUTPATIENT
Start: 2025-05-04 | End: 2025-05-06 | Stop reason: HOSPADM

## 2025-05-04 RX ORDER — BISACODYL 5 MG
10 TABLET, DELAYED RELEASE (ENTERIC COATED) ORAL DAILY PRN
Status: DISCONTINUED | OUTPATIENT
Start: 2025-05-04 | End: 2025-05-06 | Stop reason: HOSPADM

## 2025-05-04 RX ORDER — FUROSEMIDE 10 MG/ML
40 INJECTION INTRAMUSCULAR; INTRAVENOUS ONCE
Status: COMPLETED | OUTPATIENT
Start: 2025-05-04 | End: 2025-05-04

## 2025-05-04 RX ORDER — ACETAMINOPHEN 500 MG
1000 TABLET ORAL EVERY 6 HOURS PRN
Status: DISCONTINUED | OUTPATIENT
Start: 2025-05-04 | End: 2025-05-06 | Stop reason: HOSPADM

## 2025-05-04 RX ORDER — ACETAMINOPHEN 650 MG/1
650 SUPPOSITORY RECTAL EVERY 6 HOURS PRN
Status: DISCONTINUED | OUTPATIENT
Start: 2025-05-04 | End: 2025-05-06 | Stop reason: HOSPADM

## 2025-05-04 RX ORDER — TRAZODONE HYDROCHLORIDE 50 MG/1
50 TABLET ORAL NIGHTLY PRN
Status: DISCONTINUED | OUTPATIENT
Start: 2025-05-04 | End: 2025-05-06 | Stop reason: HOSPADM

## 2025-05-04 RX ORDER — TALC
6 POWDER (GRAM) TOPICAL NIGHTLY PRN
Status: DISCONTINUED | OUTPATIENT
Start: 2025-05-04 | End: 2025-05-06 | Stop reason: HOSPADM

## 2025-05-04 RX ADMIN — MUPIROCIN: 20 OINTMENT TOPICAL at 07:05

## 2025-05-04 RX ADMIN — IPRATROPIUM BROMIDE AND ALBUTEROL SULFATE 3 ML: 2.5; .5 SOLUTION RESPIRATORY (INHALATION) at 07:05

## 2025-05-04 RX ADMIN — CEFEPIME 1 G: 1 INJECTION, POWDER, FOR SOLUTION INTRAMUSCULAR; INTRAVENOUS at 05:05

## 2025-05-04 RX ADMIN — MUPIROCIN: 20 OINTMENT TOPICAL at 09:05

## 2025-05-04 RX ADMIN — HEPARIN SODIUM 5000 UNITS: 5000 INJECTION, SOLUTION INTRAVENOUS; SUBCUTANEOUS at 02:05

## 2025-05-04 RX ADMIN — FUROSEMIDE 40 MG: 10 INJECTION, SOLUTION INTRAMUSCULAR; INTRAVENOUS at 07:05

## 2025-05-04 RX ADMIN — IPRATROPIUM BROMIDE AND ALBUTEROL SULFATE 3 ML: 2.5; .5 SOLUTION RESPIRATORY (INHALATION) at 12:05

## 2025-05-04 RX ADMIN — HEPARIN SODIUM 5000 UNITS: 5000 INJECTION, SOLUTION INTRAVENOUS; SUBCUTANEOUS at 09:05

## 2025-05-04 RX ADMIN — HEPARIN SODIUM 5000 UNITS: 5000 INJECTION, SOLUTION INTRAVENOUS; SUBCUTANEOUS at 05:05

## 2025-05-04 RX ADMIN — INSULIN ASPART 1 UNITS: 100 INJECTION, SOLUTION INTRAVENOUS; SUBCUTANEOUS at 09:05

## 2025-05-04 RX ADMIN — BISACODYL 10 MG: 5 TABLET, COATED ORAL at 09:05

## 2025-05-04 NOTE — PROGRESS NOTES
[facility] Medicine  Progress Note    Patient Name: Darron Goddard  MRN: [id number]  Patient Class: IP- Inpatient   Admission Date: 5/1/2025  Length of Stay: 3 days  Attending Physician: Risa Rob DO  Primary Care Provider: Jade, Primary Doctor        Subjective     Principal Problem:Hyperkalemia        HPI:  This is an 85-year-old gentleman with past medical history significant for renal lesions (in 2023 the patient had CT abdomen pelvis performed which showed bilateral renal cysts versus mass).  In June of 2023 he had ultrasound kidney as well that showed a solid mass with recommendations for CT or MRI with renal mass protocol in the absence of hydronephrosis.      History was obtained from chart review and speaking with the patient's wife.  In the past, the patient has also reportedly been told that he may have renal malignancy and a nephrectomy was recommended however the patient did not want to get a nephrectomy performed .  He does have a history of prostate cancer in the remote past.     The patient has had blood cultures x2 done at the referring facility.     Per the patient's wife, he has been confused and not eating/drinking over the last 2-3 days.  Today, the patient had a syncopal event for a few sec and has after that the patient's wife drove him to the hospital.  He presented to the hospital at which time he was found to be markedly bradycardic.  He was treated with membrane stabilization medication, found to have hyperkalemia and a decision was made to transfer the patient over to the ICU for potential hemodialysis.  On repeat check, the patient's potassium had improved to 6.1.  He has yet another repeat check pending.  He has evidence of marked leukocytosis, hemoglobin at 11.9 and elevated troponin 65.6 without evidence of chest pain.       The patient arrived to [facility] delirious (received lorazepam on route).  He was hemodynamically stable, continued on  epinephrine drip.  I had a very detailed conversation with the patient's wife at bedside.  She shared with me that her  has stated multiple times that he would like to be DNR/DNI and does not want any aggressive measures.  I offered hemodialysis, especially if medical management did not work.  She tells me that based on her 's wishes as well as her decision as the patient's healthcare proxy, they do not wish to proceed with hemodialysis.  They are aware of the potential implications of not getting hemodialysis done especially in the setting of worsening hyperkalemia which may include fatal arrhythmia, cardiac arrest and/or death.    Overview/Hospital Course:  No notes on file    Interval History:     No significant events overnight, no new complaints or concerns. Patient mental status at or near baseline today. Not much improvement from a respiratory standpoint so will trial IV Lasix and Duonebs today.       Objective:     Vital Signs (Most Recent):  Temp: 98 °F (36.7 °C) (05/04/25 0725)  Pulse: 64 (05/04/25 0725)  Resp: 18 (05/04/25 0725)  BP: (!) 155/77 (05/04/25 0725)  SpO2: 96 % (05/04/25 0725) Vital Signs (24h Range):  Temp:  [97.9 °F (36.6 °C)-98.8 °F (37.1 °C)] 98 °F (36.7 °C)  Pulse:  [61-75] 64  Resp:  [18-19] 18  SpO2:  [85 %-96 %] 96 %  BP: (122-155)/(51-77) 155/77     Weight: 89.9 kg (198 lb 3.1 oz)  Body mass index is 28.44 kg/m².    Intake/Output Summary (Last 24 hours) at 5/4/2025 1051  Last data filed at 5/4/2025 0333  Gross per 24 hour   Intake --   Output 1600 ml   Net -1600 ml         Physical Exam  Constitutional:       General: He is not in acute distress.     Appearance: He is ill-appearing.   HENT:      Head: Normocephalic and atraumatic.      Nose: Nose normal.   Eyes:      Conjunctiva/sclera: Conjunctivae normal.      Pupils: Pupils are equal, round, and reactive to light.   Pulmonary:      Effort: Pulmonary effort is normal. No respiratory distress.   Musculoskeletal:       "Cervical back: No rigidity.   Skin:     Coloration: Skin is not jaundiced or pale.      Findings: No rash.   Neurological:      Mental Status: He is alert. Mental status is at baseline.   Psychiatric:         Behavior: Behavior normal.         Thought Content: Thought content normal.               Significant Labs: All pertinent labs within the past 24 hours have been reviewed.    Significant Imaging: I have reviewed all pertinent imaging results/findings within the past 24 hours.        Assessment & Plan  Hyperkalemia  Hyperkalemia is likely due to LATONIA.The patients most recent potassium results are listed below.  Recent Labs     05/03/25  2351 05/04/25  0500 05/04/25  0759   K 3.8 3.9 4.0     Plan  - Monitor for arrhythmias with EKG and/or continuous telemetry.   - Treat the hyperkalemia with PRN therapy.   - Monitor potassium: Daily  - The patient's hyperkalemia is improving          Acute renal failure  LATONIA is likely due to sepsis +/- obstructive uropathy. Of note, patient does have a renal mass suspicious for malignancy. Patient and family decline further evaluation or intervention at this time.     Baseline creatinine is <2. Most recent creatinine and eGFR are listed below.  Recent Labs     05/03/25 2351 05/04/25  0500 05/04/25  0759   CREATININE 3.53* 3.11* 3.02*   EGFRNORACEVR 16* 19* 20*      Plan  - LATONIA is improving  - Avoid nephrotoxins and renally dose meds for GFR listed above  - Monitor urine output, serial BMP, and adjust therapy as needed    Metabolic acidosis  Resolved. Secondary to lactic acidosis and acute renal failure.     Diabetes mellitus  Patient's FSGs are controlled on current medication regimen.  Last A1c reviewed-   Lab Results   Component Value Date    HGBA1C 5.8 05/01/2025     Most recent fingerstick glucose reviewed- No results for input(s): "POCTGLUCOSE" in the last 24 hours.  Current correctional scale  Medium  Maintain anti-hyperglycemic dose as follows-   Antihyperglycemics (From " admission, onward)      Start     Stop Route Frequency Ordered    05/01/25 1755  insulin aspart U-100 injection 0-10 Units         -- SubQ Before meals & nightly PRN 05/01/25 1655          Hold Oral hypoglycemics while patient is in the hospital.  Severe sepsis  This patient does have evidence of infective focus  My overall impression is sepsis.  Source: Unknown  Antibiotics given-   Antibiotics (72h ago, onward)      Start     Stop Route Frequency Ordered    05/01/25 2100  mupirocin 2 % ointment         05/06/25 2059 Nasl 2 times daily 05/01/25 1649    05/01/25 1745  ceFEPIme injection 1 g         05/06/25 1744 IV Every 12 hours (non-standard times) 05/01/25 1647          Latest lactate reviewed-  Recent Labs   Lab 05/02/25  0848   LACTATE 1.6     Organ dysfunction indicated by Acute kidney injury    Fluid challenge Ideal Body Weight- The patient is obese (BMI>30) and their ideal body weight of Ideal body weight: 73 kg (160 lb 15 oz) will be used to calculate fluid bolus of 30 ml/kg.     Post- resuscitation assessment Yes - I attest a sepsis perfusion exam was performed within 6 hours of sepsis, severe sepsis, or septic shock presentation, following fluid resuscitation.      Will Not start Pressors- Levophed for MAP of 65  Source control achieved by: antibiotic    Blood cultures no growth to date.   Elevated troponin  Elevated troponin at outside hospital. Reviewed EKG with no evidence of ST-elevation. Likely in the setting of acute renal failure. Patient with no evidence of chest pain.     VTE Risk Mitigation (From admission, onward)           Ordered     heparin (porcine) injection 5,000 Units  Every 8 hours         05/01/25 1630     IP VTE HIGH RISK PATIENT  Once         05/01/25 1630     Place sequential compression device  Until discontinued         05/01/25 1630                    Discharge Planning   PASQUALE: 5/9/2025     Code Status: DNR   Medical Readiness for Discharge Date:   Discharge Plan A: Home with  family                        Risa Rob DO  Department of Hospital Medicine   [facility]

## 2025-05-04 NOTE — ASSESSMENT & PLAN NOTE
Hyperkalemia is likely due to LATONIA.The patients most recent potassium results are listed below.  Recent Labs     05/03/25  2351 05/04/25  0500 05/04/25  0759   K 3.8 3.9 4.0     Plan  - Monitor for arrhythmias with EKG and/or continuous telemetry.   - Treat the hyperkalemia with PRN therapy.   - Monitor potassium: Daily  - The patient's hyperkalemia is improving

## 2025-05-04 NOTE — ASSESSMENT & PLAN NOTE
LATONIA is likely due to sepsis +/- obstructive uropathy. Of note, patient does have a renal mass suspicious for malignancy. Patient and family decline further evaluation or intervention at this time.     Baseline creatinine is <2. Most recent creatinine and eGFR are listed below.  Recent Labs     05/03/25  2351 05/04/25  0500 05/04/25  0759   CREATININE 3.53* 3.11* 3.02*   EGFRNORACEVR 16* 19* 20*      Plan  - LATONIA is improving  - Avoid nephrotoxins and renally dose meds for GFR listed above  - Monitor urine output, serial BMP, and adjust therapy as needed

## 2025-05-04 NOTE — SUBJECTIVE & OBJECTIVE
Interval History:     No significant events overnight, no new complaints or concerns. Patient mental status at or near baseline today. Not much improvement from a respiratory standpoint so will trial IV Lasix and Duonebs today.       Objective:     Vital Signs (Most Recent):  Temp: 98 °F (36.7 °C) (05/04/25 0725)  Pulse: 64 (05/04/25 0725)  Resp: 18 (05/04/25 0725)  BP: (!) 155/77 (05/04/25 0725)  SpO2: 96 % (05/04/25 0725) Vital Signs (24h Range):  Temp:  [97.9 °F (36.6 °C)-98.8 °F (37.1 °C)] 98 °F (36.7 °C)  Pulse:  [61-75] 64  Resp:  [18-19] 18  SpO2:  [85 %-96 %] 96 %  BP: (122-155)/(51-77) 155/77     Weight: 89.9 kg (198 lb 3.1 oz)  Body mass index is 28.44 kg/m².    Intake/Output Summary (Last 24 hours) at 5/4/2025 1051  Last data filed at 5/4/2025 0333  Gross per 24 hour   Intake --   Output 1600 ml   Net -1600 ml         Physical Exam  Constitutional:       General: He is not in acute distress.     Appearance: He is ill-appearing.   HENT:      Head: Normocephalic and atraumatic.      Nose: Nose normal.   Eyes:      Conjunctiva/sclera: Conjunctivae normal.      Pupils: Pupils are equal, round, and reactive to light.   Pulmonary:      Effort: Pulmonary effort is normal. No respiratory distress.   Musculoskeletal:      Cervical back: No rigidity.   Skin:     Coloration: Skin is not jaundiced or pale.      Findings: No rash.   Neurological:      Mental Status: He is alert. Mental status is at baseline.   Psychiatric:         Behavior: Behavior normal.         Thought Content: Thought content normal.               Significant Labs: All pertinent labs within the past 24 hours have been reviewed.    Significant Imaging: I have reviewed all pertinent imaging results/findings within the past 24 hours.

## 2025-05-04 NOTE — PLAN OF CARE
Problem: Diabetes Comorbidity  Goal: Blood Glucose Level Within Targeted Range  Outcome: Progressing     Problem: Adult Inpatient Plan of Care  Goal: Plan of Care Review  Outcome: Progressing  Goal: Patient-Specific Goal (Individualized)  Outcome: Progressing  Goal: Absence of Hospital-Acquired Illness or Injury  Outcome: Progressing  Goal: Optimal Comfort and Wellbeing  Outcome: Progressing     Problem: Fall Injury Risk  Goal: Absence of Fall and Fall-Related Injury  Outcome: Progressing     Problem: Restraint, Nonviolent  Goal: Absence of Harm or Injury  Outcome: Progressing     Problem: Skin Injury Risk Increased  Goal: Skin Health and Integrity  Outcome: Progressing

## 2025-05-05 LAB
ANION GAP SERPL CALCULATED.3IONS-SCNC: 18 MMOL/L (ref 7–16)
ANION GAP SERPL CALCULATED.3IONS-SCNC: 18 MMOL/L (ref 7–16)
BASOPHILS # BLD AUTO: 0.07 K/UL (ref 0–0.2)
BASOPHILS NFR BLD AUTO: 0.7 % (ref 0–1)
BUN SERPL-MCNC: 60 MG/DL (ref 8–26)
BUN SERPL-MCNC: 61 MG/DL (ref 8–26)
BUN/CREAT SERPL: 17 (ref 6–20)
BUN/CREAT SERPL: 20 (ref 6–20)
CALCIUM SERPL-MCNC: 8.8 MG/DL (ref 8.8–10)
CALCIUM SERPL-MCNC: 8.8 MG/DL (ref 8.8–10)
CHLORIDE SERPL-SCNC: 101 MMOL/L (ref 98–107)
CHLORIDE SERPL-SCNC: 102 MMOL/L (ref 98–107)
CO2 SERPL-SCNC: 23 MMOL/L (ref 23–31)
CO2 SERPL-SCNC: 24 MMOL/L (ref 23–31)
CREAT SERPL-MCNC: 2.99 MG/DL (ref 0.72–1.25)
CREAT SERPL-MCNC: 3.43 MG/DL (ref 0.72–1.25)
DIFFERENTIAL METHOD BLD: ABNORMAL
EGFR (NO RACE VARIABLE) (RUSH/TITUS): 17 ML/MIN/1.73M2
EGFR (NO RACE VARIABLE) (RUSH/TITUS): 20 ML/MIN/1.73M2
EOSINOPHIL # BLD AUTO: 0.34 K/UL (ref 0–0.5)
EOSINOPHIL NFR BLD AUTO: 3.4 % (ref 1–4)
ERYTHROCYTE [DISTWIDTH] IN BLOOD BY AUTOMATED COUNT: 12.7 % (ref 11.5–14.5)
GLUCOSE SERPL-MCNC: 116 MG/DL (ref 82–115)
GLUCOSE SERPL-MCNC: 122 MG/DL (ref 70–105)
GLUCOSE SERPL-MCNC: 122 MG/DL (ref 70–105)
GLUCOSE SERPL-MCNC: 189 MG/DL (ref 82–115)
GLUCOSE SERPL-MCNC: 193 MG/DL (ref 70–105)
GLUCOSE SERPL-MCNC: 321 MG/DL (ref 70–105)
GLUCOSE SERPL-MCNC: 61 MG/DL (ref 70–105)
HCT VFR BLD AUTO: 37.8 % (ref 40–54)
HGB BLD-MCNC: 11.7 G/DL (ref 13.5–18)
IMM GRANULOCYTES # BLD AUTO: 0.08 K/UL (ref 0–0.04)
IMM GRANULOCYTES NFR BLD: 0.8 % (ref 0–0.4)
LYMPHOCYTES # BLD AUTO: 2.14 K/UL (ref 1–4.8)
LYMPHOCYTES NFR BLD AUTO: 21.7 % (ref 27–41)
MCH RBC QN AUTO: 28.1 PG (ref 27–31)
MCHC RBC AUTO-ENTMCNC: 31 G/DL (ref 32–36)
MCV RBC AUTO: 90.6 FL (ref 80–96)
MONOCYTES # BLD AUTO: 1.22 K/UL (ref 0–0.8)
MONOCYTES NFR BLD AUTO: 12.3 % (ref 2–6)
MPC BLD CALC-MCNC: 10.1 FL (ref 9.4–12.4)
NEUTROPHILS # BLD AUTO: 6.03 K/UL (ref 1.8–7.7)
NEUTROPHILS NFR BLD AUTO: 61.1 % (ref 53–65)
NRBC # BLD AUTO: 0 X10E3/UL
NRBC, AUTO (.00): 0 %
PLATELET # BLD AUTO: 254 K/UL (ref 150–400)
POTASSIUM SERPL-SCNC: 3.7 MMOL/L (ref 3.5–5.1)
POTASSIUM SERPL-SCNC: 3.7 MMOL/L (ref 3.5–5.1)
RBC # BLD AUTO: 4.17 M/UL (ref 4.6–6.2)
SODIUM SERPL-SCNC: 138 MMOL/L (ref 136–145)
SODIUM SERPL-SCNC: 140 MMOL/L (ref 136–145)
WBC # BLD AUTO: 9.88 K/UL (ref 4.5–11)

## 2025-05-05 PROCEDURE — 80048 BASIC METABOLIC PNL TOTAL CA: CPT | Performed by: HOSPITALIST

## 2025-05-05 PROCEDURE — 63600175 PHARM REV CODE 636 W HCPCS: Performed by: FAMILY MEDICINE

## 2025-05-05 PROCEDURE — 25000242 PHARM REV CODE 250 ALT 637 W/ HCPCS: Performed by: FAMILY MEDICINE

## 2025-05-05 PROCEDURE — 25000003 PHARM REV CODE 250: Performed by: HOSPITALIST

## 2025-05-05 PROCEDURE — 99900035 HC TECH TIME PER 15 MIN (STAT)

## 2025-05-05 PROCEDURE — 36415 COLL VENOUS BLD VENIPUNCTURE: CPT | Performed by: STUDENT IN AN ORGANIZED HEALTH CARE EDUCATION/TRAINING PROGRAM

## 2025-05-05 PROCEDURE — 11000001 HC ACUTE MED/SURG PRIVATE ROOM

## 2025-05-05 PROCEDURE — 82962 GLUCOSE BLOOD TEST: CPT

## 2025-05-05 PROCEDURE — 85025 COMPLETE CBC W/AUTO DIFF WBC: CPT | Performed by: STUDENT IN AN ORGANIZED HEALTH CARE EDUCATION/TRAINING PROGRAM

## 2025-05-05 PROCEDURE — 99232 SBSQ HOSP IP/OBS MODERATE 35: CPT | Mod: ,,, | Performed by: INTERNAL MEDICINE

## 2025-05-05 PROCEDURE — 97161 PT EVAL LOW COMPLEX 20 MIN: CPT

## 2025-05-05 PROCEDURE — 99232 SBSQ HOSP IP/OBS MODERATE 35: CPT | Mod: ,,, | Performed by: FAMILY MEDICINE

## 2025-05-05 PROCEDURE — 63600175 PHARM REV CODE 636 W HCPCS: Performed by: STUDENT IN AN ORGANIZED HEALTH CARE EDUCATION/TRAINING PROGRAM

## 2025-05-05 PROCEDURE — 97165 OT EVAL LOW COMPLEX 30 MIN: CPT

## 2025-05-05 PROCEDURE — 27000221 HC OXYGEN, UP TO 24 HOURS

## 2025-05-05 PROCEDURE — 94761 N-INVAS EAR/PLS OXIMETRY MLT: CPT

## 2025-05-05 PROCEDURE — 94640 AIRWAY INHALATION TREATMENT: CPT

## 2025-05-05 RX ORDER — FUROSEMIDE 10 MG/ML
20 INJECTION INTRAMUSCULAR; INTRAVENOUS ONCE
Status: COMPLETED | OUTPATIENT
Start: 2025-05-05 | End: 2025-05-05

## 2025-05-05 RX ADMIN — CEFEPIME 1 G: 1 INJECTION, POWDER, FOR SOLUTION INTRAMUSCULAR; INTRAVENOUS at 05:05

## 2025-05-05 RX ADMIN — BISACODYL 10 MG: 5 TABLET, COATED ORAL at 12:05

## 2025-05-05 RX ADMIN — HEPARIN SODIUM 5000 UNITS: 5000 INJECTION, SOLUTION INTRAVENOUS; SUBCUTANEOUS at 05:05

## 2025-05-05 RX ADMIN — INSULIN ASPART 8 UNITS: 100 INJECTION, SOLUTION INTRAVENOUS; SUBCUTANEOUS at 12:05

## 2025-05-05 RX ADMIN — MUPIROCIN: 20 OINTMENT TOPICAL at 09:05

## 2025-05-05 RX ADMIN — BISACODYL 10 MG: 5 TABLET, COATED ORAL at 05:05

## 2025-05-05 RX ADMIN — FUROSEMIDE 20 MG: 10 INJECTION, SOLUTION INTRAMUSCULAR; INTRAVENOUS at 08:05

## 2025-05-05 RX ADMIN — HEPARIN SODIUM 5000 UNITS: 5000 INJECTION, SOLUTION INTRAVENOUS; SUBCUTANEOUS at 09:05

## 2025-05-05 RX ADMIN — IPRATROPIUM BROMIDE AND ALBUTEROL SULFATE 3 ML: 2.5; .5 SOLUTION RESPIRATORY (INHALATION) at 07:05

## 2025-05-05 RX ADMIN — INSULIN ASPART 1 UNITS: 100 INJECTION, SOLUTION INTRAVENOUS; SUBCUTANEOUS at 09:05

## 2025-05-05 RX ADMIN — IPRATROPIUM BROMIDE AND ALBUTEROL SULFATE 3 ML: 2.5; .5 SOLUTION RESPIRATORY (INHALATION) at 08:05

## 2025-05-05 RX ADMIN — TRAZODONE HYDROCHLORIDE 50 MG: 50 TABLET ORAL at 09:05

## 2025-05-05 RX ADMIN — IPRATROPIUM BROMIDE AND ALBUTEROL SULFATE 3 ML: 2.5; .5 SOLUTION RESPIRATORY (INHALATION) at 01:05

## 2025-05-05 NOTE — SUBJECTIVE & OBJECTIVE
Interval History:     No significant events overnight, no new complaints or concerns. Patient resting comfortably. PT/OT evaluation this AM with no indication for rehab placement. Attempting to wean oxygen today and will get case management for DME if needed.       Objective:     Vital Signs (Most Recent):  Temp: 98.4 °F (36.9 °C) (05/05/25 1132)  Pulse: 60 (05/05/25 1300)  Resp: 20 (05/05/25 1300)  BP: (!) 120/59 (05/05/25 1132)  SpO2: (!) 93 % (05/05/25 1300) Vital Signs (24h Range):  Temp:  [97.9 °F (36.6 °C)-99.1 °F (37.3 °C)] 98.4 °F (36.9 °C)  Pulse:  [57-76] 60  Resp:  [16-20] 20  SpO2:  [93 %-97 %] 93 %  BP: (112-163)/(54-80) 120/59     Weight: 89.9 kg (198 lb 3.1 oz)  Body mass index is 28.44 kg/m².    Intake/Output Summary (Last 24 hours) at 5/5/2025 1353  Last data filed at 5/4/2025 2129  Gross per 24 hour   Intake 237 ml   Output 350 ml   Net -113 ml         Physical Exam  Constitutional:       General: He is not in acute distress.     Appearance: He is ill-appearing.   HENT:      Head: Normocephalic and atraumatic.      Nose: Nose normal.   Eyes:      Conjunctiva/sclera: Conjunctivae normal.      Pupils: Pupils are equal, round, and reactive to light.   Pulmonary:      Effort: Pulmonary effort is normal. No respiratory distress.   Musculoskeletal:      Cervical back: No rigidity.   Skin:     Coloration: Skin is not jaundiced or pale.      Findings: No rash.   Neurological:      Mental Status: He is alert. Mental status is at baseline.   Psychiatric:         Behavior: Behavior normal.         Thought Content: Thought content normal.               Significant Labs: All pertinent labs within the past 24 hours have been reviewed.    Significant Imaging: I have reviewed all pertinent imaging results/findings within the past 24 hours.

## 2025-05-05 NOTE — ASSESSMENT & PLAN NOTE
Hyperkalemia is likely due to LATONIA.The patients most recent potassium results are listed below.  Recent Labs     05/04/25  1952 05/04/25  2350 05/05/25  0332   K 3.8 3.7 3.7     Plan  - Monitor for arrhythmias with EKG and/or continuous telemetry.   - Treat the hyperkalemia with PRN therapy.   - Monitor potassium: Daily  - The patient's hyperkalemia is improving

## 2025-05-05 NOTE — PROGRESS NOTES
[facility] Hospital Medicine  Progress Note    Patient Name: Darron Goddard  MRN: [id number]  Patient Class: IP- Inpatient   Admission Date: 5/1/2025  Length of Stay: 4 days  Attending Physician: Risa Rob DO  Primary Care Provider: Jade, Primary Doctor        Subjective     Principal Problem:Hyperkalemia        HPI:  This is an 85-year-old gentleman with past medical history significant for renal lesions (in 2023 the patient had CT abdomen pelvis performed which showed bilateral renal cysts versus mass).  In June of 2023 he had ultrasound kidney as well that showed a solid mass with recommendations for CT or MRI with renal mass protocol in the absence of hydronephrosis.      History was obtained from chart review and speaking with the patient's wife.  In the past, the patient has also reportedly been told that he may have renal malignancy and a nephrectomy was recommended however the patient did not want to get a nephrectomy performed .  He does have a history of prostate cancer in the remote past.     The patient has had blood cultures x2 done at the referring facility.     Per the patient's wife, he has been confused and not eating/drinking over the last 2-3 days.  Today, the patient had a syncopal event for a few sec and has after that the patient's wife drove him to the hospital.  He presented to the hospital at which time he was found to be markedly bradycardic.  He was treated with membrane stabilization medication, found to have hyperkalemia and a decision was made to transfer the patient over to the ICU for potential hemodialysis.  On repeat check, the patient's potassium had improved to 6.1.  He has yet another repeat check pending.  He has evidence of marked leukocytosis, hemoglobin at 11.9 and elevated troponin 65.6 without evidence of chest pain.       The patient arrived to [facility] delirious (received lorazepam on route).  He was hemodynamically stable, continued on  epinephrine drip.  I had a very detailed conversation with the patient's wife at bedside.  She shared with me that her  has stated multiple times that he would like to be DNR/DNI and does not want any aggressive measures.  I offered hemodialysis, especially if medical management did not work.  She tells me that based on her 's wishes as well as her decision as the patient's healthcare proxy, they do not wish to proceed with hemodialysis.  They are aware of the potential implications of not getting hemodialysis done especially in the setting of worsening hyperkalemia which may include fatal arrhythmia, cardiac arrest and/or death.    Overview/Hospital Course:  No notes on file    Interval History:     No significant events overnight, no new complaints or concerns. Patient resting comfortably. PT/OT evaluation this AM with no indication for rehab placement. Attempting to wean oxygen today and will get case management for DME if needed.       Objective:     Vital Signs (Most Recent):  Temp: 98.4 °F (36.9 °C) (05/05/25 1132)  Pulse: 60 (05/05/25 1300)  Resp: 20 (05/05/25 1300)  BP: (!) 120/59 (05/05/25 1132)  SpO2: (!) 93 % (05/05/25 1300) Vital Signs (24h Range):  Temp:  [97.9 °F (36.6 °C)-99.1 °F (37.3 °C)] 98.4 °F (36.9 °C)  Pulse:  [57-76] 60  Resp:  [16-20] 20  SpO2:  [93 %-97 %] 93 %  BP: (112-163)/(54-80) 120/59     Weight: 89.9 kg (198 lb 3.1 oz)  Body mass index is 28.44 kg/m².    Intake/Output Summary (Last 24 hours) at 5/5/2025 1353  Last data filed at 5/4/2025 2129  Gross per 24 hour   Intake 237 ml   Output 350 ml   Net -113 ml         Physical Exam  Constitutional:       General: He is not in acute distress.     Appearance: He is ill-appearing.   HENT:      Head: Normocephalic and atraumatic.      Nose: Nose normal.   Eyes:      Conjunctiva/sclera: Conjunctivae normal.      Pupils: Pupils are equal, round, and reactive to light.   Pulmonary:      Effort: Pulmonary effort is normal. No  "respiratory distress.   Musculoskeletal:      Cervical back: No rigidity.   Skin:     Coloration: Skin is not jaundiced or pale.      Findings: No rash.   Neurological:      Mental Status: He is alert. Mental status is at baseline.   Psychiatric:         Behavior: Behavior normal.         Thought Content: Thought content normal.               Significant Labs: All pertinent labs within the past 24 hours have been reviewed.    Significant Imaging: I have reviewed all pertinent imaging results/findings within the past 24 hours.          Assessment & Plan  Hyperkalemia  Hyperkalemia is likely due to LATONIA.The patients most recent potassium results are listed below.  Recent Labs     05/04/25 1952 05/04/25 2350 05/05/25  0332   K 3.8 3.7 3.7     Plan  - Monitor for arrhythmias with EKG and/or continuous telemetry.   - Treat the hyperkalemia with PRN therapy.   - Monitor potassium: Daily  - The patient's hyperkalemia is improving          Acute renal failure  LATONIA is likely due to sepsis +/- obstructive uropathy. Of note, patient does have a renal mass suspicious for malignancy. Patient and family decline further evaluation or intervention at this time.     Baseline creatinine is <2. Most recent creatinine and eGFR are listed below.  Recent Labs     05/04/25 1952 05/04/25 2350 05/05/25  0332   CREATININE 3.08* 3.43* 2.99*   EGFRNORACEVR 19* 17* 20*      Plan  - LATONIA is improving  - Avoid nephrotoxins and renally dose meds for GFR listed above  - Monitor urine output, serial BMP, and adjust therapy as needed    Metabolic acidosis  Resolved. Secondary to lactic acidosis and acute renal failure.     Diabetes mellitus  Patient's FSGs are controlled on current medication regimen.  Last A1c reviewed-   Lab Results   Component Value Date    HGBA1C 5.8 05/01/2025     Most recent fingerstick glucose reviewed- No results for input(s): "POCTGLUCOSE" in the last 24 hours.  Current correctional scale  Medium  Maintain " "anti-hyperglycemic dose as follows-   Antihyperglycemics (From admission, onward)      Start     Stop Route Frequency Ordered    05/01/25 1755  insulin aspart U-100 injection 0-10 Units         -- SubQ Before meals & nightly PRN 05/01/25 1655          Hold Oral hypoglycemics while patient is in the hospital.  Severe sepsis  This patient does have evidence of infective focus  My overall impression is sepsis.  Source: Unknown  Antibiotics given-   Antibiotics (72h ago, onward)      Start     Stop Route Frequency Ordered    05/01/25 2100  mupirocin 2 % ointment         05/06/25 2059 Nasl 2 times daily 05/01/25 1649 05/01/25 1745  ceFEPIme injection 1 g         05/06/25 1744 IV Every 12 hours (non-standard times) 05/01/25 1647          Latest lactate reviewed-  No results for input(s): "LACTATE", "POCLAC" in the last 72 hours.    Organ dysfunction indicated by Acute kidney injury    Fluid challenge Ideal Body Weight- The patient is obese (BMI>30) and their ideal body weight of Ideal body weight: 73 kg (160 lb 15 oz) will be used to calculate fluid bolus of 30 ml/kg.     Post- resuscitation assessment Yes - I attest a sepsis perfusion exam was performed within 6 hours of sepsis, severe sepsis, or septic shock presentation, following fluid resuscitation.      Will Not start Pressors- Levophed for MAP of 65  Source control achieved by: antibiotic    Blood cultures no growth to date.   Elevated troponin  Elevated troponin at outside hospital. Reviewed EKG with no evidence of ST-elevation. Likely in the setting of acute renal failure. Patient with no evidence of chest pain.     VTE Risk Mitigation (From admission, onward)           Ordered     heparin (porcine) injection 5,000 Units  Every 8 hours         05/01/25 1630     IP VTE HIGH RISK PATIENT  Once         05/01/25 1630     Place sequential compression device  Until discontinued         05/01/25 1630                    Discharge Planning   PASQUALE: 5/12/2025     Code " Status: DNR   Medical Readiness for Discharge Date:   Discharge Plan A: Home with family                Please place Justification for DME        Risa Rob DO  Department of Hospital Medicine   [facility]

## 2025-05-05 NOTE — ASSESSMENT & PLAN NOTE
"This patient does have evidence of infective focus  My overall impression is sepsis.  Source: Unknown  Antibiotics given-   Antibiotics (72h ago, onward)      Start     Stop Route Frequency Ordered    05/01/25 2100  mupirocin 2 % ointment         05/06/25 2059 Nasl 2 times daily 05/01/25 1649 05/01/25 1745  ceFEPIme injection 1 g         05/06/25 1744 IV Every 12 hours (non-standard times) 05/01/25 1647          Latest lactate reviewed-  No results for input(s): "LACTATE", "POCLAC" in the last 72 hours.    Organ dysfunction indicated by Acute kidney injury    Fluid challenge Ideal Body Weight- The patient is obese (BMI>30) and their ideal body weight of Ideal body weight: 73 kg (160 lb 15 oz) will be used to calculate fluid bolus of 30 ml/kg.     Post- resuscitation assessment Yes - I attest a sepsis perfusion exam was performed within 6 hours of sepsis, severe sepsis, or septic shock presentation, following fluid resuscitation.      Will Not start Pressors- Levophed for MAP of 65  Source control achieved by: antibiotic    Blood cultures no growth to date.   "

## 2025-05-05 NOTE — PROGRESS NOTES
"[facility] Consult Follow-Up Note     HPI:   85-year-old male  With medical history significant for prostate cancer diabetes, renal cancer who presents to the hospital with symptomatic bradycardia room an outside hospital.  He was found to have a potassium of 7 at status.  He also was noted to be in renal failure.  He was transferred to our facility for further management.  He had a Brooks placed at the outside facility.  He had 3 L of urine output since Brooks placement.  Nephrology is consulted for LATONIA on CKD, hyperkalemia.        Subjective/Interval History:  No acute events overnight    Objective     Medications:   albuterol-ipratropium  3 mL Nebulization Q6H WAKE    ceFEPime IV (PEDS and ADULTS)  1 g Intravenous Q12H    heparin (porcine)  5,000 Units Subcutaneous Q8H    mupirocin   Nasal BID       Physical Exam:   BP (!) 163/78   Pulse (!) 59   Temp 97.9 °F (36.6 °C) (Oral)   Resp 16   Ht 5' 10" (1.778 m)   Wt 89.9 kg (198 lb 3.1 oz)   SpO2 97%   BMI 28.44 kg/m²   General: WD, WN , lying in bed in NAD  Eyes: PERRL, EOMI, no conjunctival icterus  HENT: NC/AT, nares patent, OP benign  Neck: supple, no LAD or thyromegaly  Lungs: CTAB, no w/r/r  CV: normal rate, regular rhythm, no m/r/g, no edema  Abd: soft, NT/ND, +BS   Ext: no clubbing or cyanosis  Skin: no rashes or lesions appreciated  Neuro: awake, alert, following commands    I/Os:   I/O last 3 completed shifts:  In: 237 [P.O.:237]  Out: 3350 [Urine:3350]    Labs, micro, imaging reviewed.   Recent Labs     05/04/25  1952 05/04/25  2350 05/05/25  0332   CALCIUM 8.7* 8.8 8.8    138 140   K 3.8 3.7 3.7    101 102   CO2 24 23 24   BUN 64* 60* 61*   CREATININE 3.08* 3.43* 2.99*   * 189* 116*         Pertinent for:   BUN.sCr 61/2.99    Assessment and Plan:   Problem List[1]    LATONIA  Hyperkalemia  Metabolic acidosis  - Acute complicated illness that poses a threat to life or bodily function without treatment  - Discussed with " consulting service  - Records reviewed prior to admission, Baseline cr 1.  - Admission- Cr 1.7 in 2023  - confirmed with family member inpatient that they do not wish to pursue dialysis.  I do not think that he needs dialysis at this time.  However if he were to need it the family does not wish to pursue that option  - Patient stable to DC. Will arrange clinic f/u although the wife tells me he is likely not to come   - Labs: Will order renal function for tomorrow   - Please avoid nephrotoxic agents/NSAIDs  - Renally dose all medications   - Please monitor strict UOP  - Daily weights      Thank you for this consult. [facility] will continue to follow along. Please call with any questions.     Gricel Barragan, DO [facility]   05/05/2025         [1]   Patient Active Problem List  Diagnosis    Acute renal failure    Hyperkalemia    Metabolic acidosis    Hypertension    Diabetes mellitus    Cancer    Acute cystitis with hematuria    Hyperlipidemia    Esophageal dysphagia    Esophagitis    HH (hiatus hernia)    Duodenitis    H. pylori infection    Severe sepsis    Elevated troponin

## 2025-05-05 NOTE — PT/OT/SLP EVAL
"Occupational Therapy   Evaluation    Name: Darron Goddard  MRN: [id number]  Admitting Diagnosis: Hyperkalemia  Recent Surgery: * No surgery found *      Recommendations:     Discharge Recommendations: Low Intensity Therapy  Discharge Equipment Recommendations:  none  Barriers to discharge:  None    Assessment:     Darron Goddard is a 85 y.o. male with a medical diagnosis of Hyperkalemia.  He presents with alert and agreeable to evaluation. Performance deficits affecting function: impaired endurance, impaired self care skills, gait instability.      Rehab Prognosis: Good; patient would benefit from acute skilled OT services to address these deficits and reach maximum level of function.       Plan:     Patient to be seen 5 x/week to address the above listed problems via self-care/home management, therapeutic activities, therapeutic exercises  Plan of Care Expires: 06/02/25  Plan of Care Reviewed with: patient, spouse    Subjective     Chief Complaint: Hyperkalemia    Patient/Family Comments/goals: "We have been in the hospital before and we were able to return home and get better without any home health."    Occupational Profile:  Living Environment: Pt lives with his spouse  in a single level home with threshold to enter  Previous level of function: Pt is independent with his mobility at baseline and performs his own self-care.   Roles and Routines: Pt is  and retired and takes care of himself  Equipment Used at Home: bedside commode, hospital bed, walker, rolling, rollator, shower chair, wheelchair  Assistance upon Discharge: Pt will have assistance from his spouse    Pain/Comfort:  Pain Rating 1: 0/10  Pain Rating Post-Intervention 1: 0/10    Patients cultural, spiritual, [employer]alism conflicts given the current situation: no    Objective:     Communicated with: ANGUS Gasca prior to session.  Patient found HOB elevated with peripheral IV, oxygen, pulse ox (continuous) upon OT entry to " room.    General Precautions: Standard, fall  Orthopedic Precautions: N/A  Braces: N/A  Respiratory Status: Nasal cannula, flow 4 L/min    Occupational Performance:    Bed Mobility:    Patient completed Supine to Sit with stand by assistance    Functional Mobility/Transfers:  Patient completed Sit <> Stand Transfer with contact guard assistance  with  rolling walker   Patient completed Bed <> Chair Transfer using Step Transfer technique with contact guard assistance with rolling walker  Functional Mobility: Pt ambulated into hallway, but his knees were wanting to buckle, so he returned to room and sat in the recliner chair    Activities of Daily Living:  Upper Body Dressing: minimum assistance for gown as robe  Lower Body Dressing: independence for socks    Cognitive/Visual Perceptual:  Cognitive/Psychosocial Skills:     -       Oriented to: Person and Place   -       Follows Commands/attention:Follows one-step commands  -       Communication: clear/fluent  -       Safety awareness/insight to disability: fair   -       Mood/Affect/Coping skills/emotional control: Cooperative  Visual/Perceptual:      -Impaired  acuity      Physical Exam:  Balance:    -       sitting with independence, standing with CGA with RW  Dominant hand:    -       right  Upper Extremity Range of Motion:     -       Right Upper Extremity: WNL  -       Left Upper Extremity: WNL  Upper Extremity Strength:    -       Right Upper Extremity: WFL  -       Left Upper Extremity: WFL   Strength:    -       Right Upper Extremity: WFL  -       Left Upper Extremity: WFL  Gross motor coordination:   WFL    AMPAC 6 Click ADL:  AMPAC Total Score: 18    Treatment & Education:  Pt educated on OT role/POC.   Importance of OOB activity with staff assistance.  Importance of sitting up in the chair throughout the day as tolerated, especially for meals   Safety during functional t/f and mobility with use of RW  Importance of assisting with self-care activities    All questions/concerns answered within OT scope of practice      Patient left up in chair with all lines intact, call button in reach, RN notified, and spouse present    GOALS:   Multidisciplinary Problems       Occupational Therapy Goals          Problem: Occupational Therapy    Goal Priority Disciplines Outcome Interventions   Occupational Therapy Goal     OT, PT/OT Progressing    Description: STG: (in 1 week)  Pt will perform grooming with setup  Pt will bathe with CGA  Pt will perform UE dressing with setup  Pt will perform LE dressing with CGA  Pt will transfer bed/chair/toilet with RW and SBA  Pt will perform standing task x 5 min with RW and SBA   Pt will tolerate 15 minutes of tx without fatigue      LTG: (in 5 weeks)  1.Restore to max I with self care and mobility.                           History:     Past Medical History:   Diagnosis Date    Cancer     prostate diagnosed 2013    Diabetes mellitus     H. pylori infection 6/13/2023    Hypertension          Past Surgical History:   Procedure Laterality Date    RADIOACTIVE SEED IMPLANTATION OF PROSTATE         Time Tracking:     OT Date of Treatment: 05/05/25  OT Start Time: 0908  OT Stop Time: 0930  OT Total Time (min): 22 min    Billable Minutes:Evaluation low complexity    5/5/2025

## 2025-05-05 NOTE — PT/OT/SLP EVAL
Physical Therapy Evaluation     Patient Name: Darron Goddard   MRN: [id number]  Recent Surgery: * No surgery found *      Recommendations:     Discharge Recommendations: Low Intensity Therapy   Discharge Equipment Recommendations: none   Barriers to discharge: None    Assessment:     Darron Goddard is a 85 y.o. male admitted with a medical diagnosis of Hyperkalemia. He presents with the following impairments/functional limitations: impaired self care skills, impaired functional mobility. Pt was confused and severely disoriented upon admission but has returned to baseline mental status which is mildly confused. Pt required very little assistance with bed mobility but had mild knee buckle during ambulation. He does not ambulate much at home due to impaired vision. He is not far from his baseline and should continue to improve with mobility. Pt maintained oxygen sats at 95% on 2 L oxygen. Should be able to return home at d/c    Rehab Prognosis: Good; patient would benefit from acute PT services to address these deficits and reach maximum level of function.    Plan:     During this hospitalization, patient to be seen 5 x/week to address the above listed problems via gait training, therapeutic activities, therapeutic exercises    Plan of Care Expires: 06/05/25    Subjective     Chief Complaint: weakness  Patient Comments/Goals: Pt is agreeable to PT   Pain/Comfort:  Pain Rating 1: 0/10  Pain Rating Post-Intervention 1: 0/10    Social History:  Living Environment: Patient lives with their spouse in a single story home with number of outside stair(s): 0  Prior Level of Function: Prior to admission, patient was modified independent  Equipment Used at Home: bedside commode, hospital bed, walker, rolling, rollator, shower chair, wheelchair  DME owned (not currently used): none  Assistance Upon Discharge: significant other    Objective:     Communicated with OSIEL Gasca RN prior to session. Patient found HOB elevated with  peripheral IV, oxygen, pulse ox (continuous) upon PT entry to room.    General Precautions: Standard, fall   Orthopedic Precautions: N/A   Braces: N/A    Respiratory Status: Nasal cannula, flow 4 L/min    Exams:  Cognition: Patient is oriented to Person, Place, Time, Situation  RLE ROM: WFL  RLE Strength: WFL  LLE ROM: WFL  LLE Strength: WFL  Gross Motor Coordination: WFL  Sensation:    -       Intact  but reports dullness to B great toes    Functional Mobility:  Gait belt applied - Yes  Bed Mobility  Scooting: stand by assistance  Supine to Sit: stand by assistance for trunk management  Transfers  Sit to Stand: contact guard assistance with rolling walker  Bed to Chair: contact guard assistance with rolling walker using Step Transfer  Gait  Patient ambulated 40 ft with rolling walker and contact guard assistance. Patient demonstrates decreased step length. Mild knee buckle. All lines remained intact throughout ambulation trail and portable Supplemental O2 2L utilized.  Balance  Sitting: independence  Standing: contact guard assistance      Therapeutic Activities and Exercises:   Patient educated on role of acute care PT and PT POC, safety while in hospital including calling nurse for mobility, and call light usage  Patient educated about importance of OOB mobility and remaining up in chair most of the day.      AM-PAC 6 CLICK MOBILITY  Total Score:22    Patient left up in chair with all lines intact and call button in reach.    GOALS:   Multidisciplinary Problems       Physical Therapy Goals          Problem: Physical Therapy    Goal Priority Disciplines Outcome Interventions   Physical Therapy Goal     PT, PT/OT Progressing    Description: Short term goals:  1. Supine to sit with Modified [place]  2. Sit to stand transfer with Modified [place]  3. Bed to chair transfer with Modified [place] using Rolling Walker  4. Gait  x 100 feet with Modified [place] using Rolling Walker.     Long term  goals:  Pt will return home to PLOF with all mobility                         DME Justifications:  No DME recommended requiring DME justifications    History:     Past Medical History:   Diagnosis Date    Cancer     prostate diagnosed 2013    Diabetes mellitus     H. pylori infection 6/13/2023    Hypertension        Past Surgical History:   Procedure Laterality Date    RADIOACTIVE SEED IMPLANTATION OF PROSTATE         Time Tracking:     PT Received On: 05/05/25  PT Start Time: 0908  PT Stop Time: 0924  PT Total Time (min): 16 min     Billable Minutes: Evaluation low complexity    5/5/2025

## 2025-05-05 NOTE — PLAN OF CARE
05/05/25 1100   Rounds   Attendance Provider;Nurse ;Charge nurse;Physical therapist;Pharmacist   Discharge Plan A Home with family   Why the patient remains in the hospital Requires continued medical care   Transition of Care Barriers None     Per meeting,  patient is not medically ready for discharge at this time.  Plan is for patient to be discharged home with family.  CM will continue to follow for DC needs as they arise.

## 2025-05-05 NOTE — PLAN OF CARE
Problem: Diabetes Comorbidity  Goal: Blood Glucose Level Within Targeted Range  Outcome: Progressing     Problem: Adult Inpatient Plan of Care  Goal: Plan of Care Review  Outcome: Progressing  Goal: Patient-Specific Goal (Individualized)  Outcome: Progressing

## 2025-05-05 NOTE — ASSESSMENT & PLAN NOTE
LATONIA is likely due to sepsis +/- obstructive uropathy. Of note, patient does have a renal mass suspicious for malignancy. Patient and family decline further evaluation or intervention at this time.     Baseline creatinine is <2. Most recent creatinine and eGFR are listed below.  Recent Labs     05/04/25  1952 05/04/25  2350 05/05/25  0332   CREATININE 3.08* 3.43* 2.99*   EGFRNORACEVR 19* 17* 20*      Plan  - LATONIA is improving  - Avoid nephrotoxins and renally dose meds for GFR listed above  - Monitor urine output, serial BMP, and adjust therapy as needed

## 2025-05-05 NOTE — PLAN OF CARE
Problem: Occupational Therapy  Goal: Occupational Therapy Goal  Description: STG: (in 1 week)  Pt will perform grooming with setup  Pt will bathe with CGA  Pt will perform UE dressing with setup  Pt will perform LE dressing with CGA  Pt will transfer bed/chair/toilet with RW and SBA  Pt will perform standing task x 5 min with RW and SBA   Pt will tolerate 15 minutes of tx without fatigue      LTG: (in 5 weeks)  1.Restore to max I with self care and mobility.    Outcome: Progressing

## 2025-05-05 NOTE — PLAN OF CARE
Problem: Physical Therapy  Goal: Physical Therapy Goal  Description: Short term goals:  1. Supine to sit with Modified [place]  2. Sit to stand transfer with Modified [place]  3. Bed to chair transfer with Modified [place] using Rolling Walker  4. Gait  x 100 feet with Modified [place] using Rolling Walker.     Long term goals:  Pt will return home to PLOF with all mobility    Outcome: Progressing

## 2025-05-06 VITALS
WEIGHT: 198.19 LBS | HEART RATE: 58 BPM | SYSTOLIC BLOOD PRESSURE: 120 MMHG | BODY MASS INDEX: 28.37 KG/M2 | HEIGHT: 70 IN | TEMPERATURE: 98 F | DIASTOLIC BLOOD PRESSURE: 65 MMHG | RESPIRATION RATE: 18 BRPM | OXYGEN SATURATION: 91 %

## 2025-05-06 PROBLEM — R79.89 ELEVATED TROPONIN: Status: RESOLVED | Noted: 2025-05-01 | Resolved: 2025-05-06

## 2025-05-06 PROBLEM — R65.20 SEVERE SEPSIS: Status: RESOLVED | Noted: 2025-05-01 | Resolved: 2025-05-06

## 2025-05-06 PROBLEM — E87.5 HYPERKALEMIA: Status: RESOLVED | Noted: 2023-06-07 | Resolved: 2025-05-06

## 2025-05-06 PROBLEM — A41.9 SEVERE SEPSIS: Status: RESOLVED | Noted: 2025-05-01 | Resolved: 2025-05-06

## 2025-05-06 PROBLEM — E87.20 METABOLIC ACIDOSIS: Status: RESOLVED | Noted: 2023-06-07 | Resolved: 2025-05-06

## 2025-05-06 PROBLEM — N17.9 ACUTE RENAL FAILURE: Status: RESOLVED | Noted: 2023-06-07 | Resolved: 2025-05-06

## 2025-05-06 LAB
ANION GAP SERPL CALCULATED.3IONS-SCNC: 18 MMOL/L (ref 7–16)
BASOPHILS # BLD AUTO: 0.07 K/UL (ref 0–0.2)
BASOPHILS NFR BLD AUTO: 0.8 % (ref 0–1)
BUN SERPL-MCNC: 55 MG/DL (ref 8–26)
BUN/CREAT SERPL: 17 (ref 6–20)
CALCIUM SERPL-MCNC: 8.8 MG/DL (ref 8.8–10)
CHLORIDE SERPL-SCNC: 100 MMOL/L (ref 98–107)
CO2 SERPL-SCNC: 22 MMOL/L (ref 23–31)
CREAT SERPL-MCNC: 3.17 MG/DL (ref 0.72–1.25)
DIFFERENTIAL METHOD BLD: ABNORMAL
EGFR (NO RACE VARIABLE) (RUSH/TITUS): 18 ML/MIN/1.73M2
EOSINOPHIL # BLD AUTO: 0.61 K/UL (ref 0–0.5)
EOSINOPHIL NFR BLD AUTO: 6.7 % (ref 1–4)
ERYTHROCYTE [DISTWIDTH] IN BLOOD BY AUTOMATED COUNT: 12.8 % (ref 11.5–14.5)
GLUCOSE SERPL-MCNC: 133 MG/DL (ref 70–105)
GLUCOSE SERPL-MCNC: 136 MG/DL (ref 70–105)
GLUCOSE SERPL-MCNC: 177 MG/DL (ref 82–115)
HCT VFR BLD AUTO: 35.6 % (ref 40–54)
HGB BLD-MCNC: 11.7 G/DL (ref 13.5–18)
IMM GRANULOCYTES # BLD AUTO: 0.1 K/UL (ref 0–0.04)
IMM GRANULOCYTES NFR BLD: 1.1 % (ref 0–0.4)
LYMPHOCYTES # BLD AUTO: 2.07 K/UL (ref 1–4.8)
LYMPHOCYTES NFR BLD AUTO: 22.6 % (ref 27–41)
MCH RBC QN AUTO: 28.5 PG (ref 27–31)
MCHC RBC AUTO-ENTMCNC: 32.9 G/DL (ref 32–36)
MCV RBC AUTO: 86.6 FL (ref 80–96)
MONOCYTES # BLD AUTO: 1.06 K/UL (ref 0–0.8)
MONOCYTES NFR BLD AUTO: 11.6 % (ref 2–6)
MPC BLD CALC-MCNC: 10.2 FL (ref 9.4–12.4)
NEUTROPHILS # BLD AUTO: 5.25 K/UL (ref 1.8–7.7)
NEUTROPHILS NFR BLD AUTO: 57.2 % (ref 53–65)
NRBC # BLD AUTO: 0 X10E3/UL
NRBC, AUTO (.00): 0 %
PLATELET # BLD AUTO: 269 K/UL (ref 150–400)
POTASSIUM SERPL-SCNC: 4.2 MMOL/L (ref 3.5–5.1)
RBC # BLD AUTO: 4.11 M/UL (ref 4.6–6.2)
SODIUM SERPL-SCNC: 136 MMOL/L (ref 136–145)
WBC # BLD AUTO: 9.16 K/UL (ref 4.5–11)

## 2025-05-06 PROCEDURE — 85025 COMPLETE CBC W/AUTO DIFF WBC: CPT | Performed by: STUDENT IN AN ORGANIZED HEALTH CARE EDUCATION/TRAINING PROGRAM

## 2025-05-06 PROCEDURE — 82962 GLUCOSE BLOOD TEST: CPT

## 2025-05-06 PROCEDURE — 25000003 PHARM REV CODE 250: Performed by: FAMILY MEDICINE

## 2025-05-06 PROCEDURE — 99900035 HC TECH TIME PER 15 MIN (STAT)

## 2025-05-06 PROCEDURE — 99239 HOSP IP/OBS DSCHRG MGMT >30: CPT | Mod: ,,, | Performed by: FAMILY MEDICINE

## 2025-05-06 PROCEDURE — 97116 GAIT TRAINING THERAPY: CPT | Mod: CQ

## 2025-05-06 PROCEDURE — 27000221 HC OXYGEN, UP TO 24 HOURS

## 2025-05-06 PROCEDURE — 94640 AIRWAY INHALATION TREATMENT: CPT

## 2025-05-06 PROCEDURE — 25000003 PHARM REV CODE 250: Performed by: HOSPITALIST

## 2025-05-06 PROCEDURE — 36415 COLL VENOUS BLD VENIPUNCTURE: CPT | Performed by: STUDENT IN AN ORGANIZED HEALTH CARE EDUCATION/TRAINING PROGRAM

## 2025-05-06 PROCEDURE — 63600175 PHARM REV CODE 636 W HCPCS: Performed by: STUDENT IN AN ORGANIZED HEALTH CARE EDUCATION/TRAINING PROGRAM

## 2025-05-06 PROCEDURE — 25000242 PHARM REV CODE 250 ALT 637 W/ HCPCS: Performed by: FAMILY MEDICINE

## 2025-05-06 PROCEDURE — 97110 THERAPEUTIC EXERCISES: CPT | Mod: CQ

## 2025-05-06 PROCEDURE — 36415 COLL VENOUS BLD VENIPUNCTURE: CPT | Performed by: FAMILY MEDICINE

## 2025-05-06 PROCEDURE — 80048 BASIC METABOLIC PNL TOTAL CA: CPT | Performed by: FAMILY MEDICINE

## 2025-05-06 RX ORDER — DAPAGLIFLOZIN 5 MG/1
5 TABLET, FILM COATED ORAL
COMMUNITY
Start: 2025-03-11

## 2025-05-06 RX ORDER — ATENOLOL 25 MG/1
50 TABLET ORAL DAILY
Status: DISCONTINUED | OUTPATIENT
Start: 2025-05-06 | End: 2025-05-06 | Stop reason: HOSPADM

## 2025-05-06 RX ORDER — ATORVASTATIN CALCIUM 40 MG/1
40 TABLET, FILM COATED ORAL DAILY
Status: DISCONTINUED | OUTPATIENT
Start: 2025-05-06 | End: 2025-05-06 | Stop reason: HOSPADM

## 2025-05-06 RX ORDER — AMLODIPINE BESYLATE 5 MG/1
5 TABLET ORAL DAILY
Status: DISCONTINUED | OUTPATIENT
Start: 2025-05-06 | End: 2025-05-06 | Stop reason: HOSPADM

## 2025-05-06 RX ADMIN — ATORVASTATIN CALCIUM 40 MG: 40 TABLET, FILM COATED ORAL at 09:05

## 2025-05-06 RX ADMIN — IPRATROPIUM BROMIDE AND ALBUTEROL SULFATE 3 ML: 2.5; .5 SOLUTION RESPIRATORY (INHALATION) at 07:05

## 2025-05-06 RX ADMIN — AMLODIPINE BESYLATE 5 MG: 5 TABLET ORAL at 09:05

## 2025-05-06 RX ADMIN — BISACODYL 10 MG: 5 TABLET, COATED ORAL at 09:05

## 2025-05-06 RX ADMIN — DOCUSATE SODIUM 100 MG: 100 CAPSULE, LIQUID FILLED ORAL at 09:05

## 2025-05-06 RX ADMIN — ATENOLOL 50 MG: 25 TABLET ORAL at 09:05

## 2025-05-06 RX ADMIN — IPRATROPIUM BROMIDE AND ALBUTEROL SULFATE 3 ML: 2.5; .5 SOLUTION RESPIRATORY (INHALATION) at 01:05

## 2025-05-06 RX ADMIN — HEPARIN SODIUM 5000 UNITS: 5000 INJECTION, SOLUTION INTRAVENOUS; SUBCUTANEOUS at 05:05

## 2025-05-06 RX ADMIN — CEFEPIME 1 G: 1 INJECTION, POWDER, FOR SOLUTION INTRAMUSCULAR; INTRAVENOUS at 05:05

## 2025-05-06 RX ADMIN — MUPIROCIN: 20 OINTMENT TOPICAL at 09:05

## 2025-05-06 NOTE — ASSESSMENT & PLAN NOTE
LATONIA is likely due to sepsis +/- obstructive uropathy. Of note, patient does have a renal mass suspicious for malignancy. Patient and family decline further evaluation or intervention at this time.     Baseline creatinine is ~2. Most recent creatinine and eGFR are listed below.  Recent Labs     05/04/25  2350 05/05/25  0332 05/06/25  0854   CREATININE 3.43* 2.99* 3.17*   EGFRNORACEVR 17* 20* 18*     LATONIA is improving. Renal function not returned to baseline however has been stable now for 48-72 hours with labs otherwise unremarkable.     Encourage oral hydration, recommend follow up with PCP within a week for repeat labs. Avoid NSAIDs, other nephrotoxic agents.     Patient also has appointment to establish care with nephrology (Dr. Gricel Barragan), follow up as scheduled.

## 2025-05-06 NOTE — ASSESSMENT & PLAN NOTE
Patient's FSGs are controlled on current medication regimen.  Last A1c reviewed-   Lab Results   Component Value Date    HGBA1C 5.8 05/01/2025     Continue medications per discharge medication reconciliation. Follow up with primary care.

## 2025-05-06 NOTE — ASSESSMENT & PLAN NOTE
"Sepsis vs derangements secondary to acute renal failure with hypovolemia. No infectious source identified. Blood cultures negative. Did receive course of broad-spectrum antibiotics while inpatient, will not continue antibiotics upon discharge.     -----  On admission:     This patient does have evidence of infective focus  My overall impression is sepsis.  Source: Unknown  Antibiotics given-   Antibiotics (72h ago, onward)        Start     Stop Route Frequency Ordered     05/01/25 2100   mupirocin 2 % ointment         05/06/25 2059 Nasl 2 times daily 05/01/25 1649 05/01/25 1745   ceFEPIme injection 1 g         05/06/25 1744 IV Every 12 hours (non-standard times) 05/01/25 1647             Latest lactate reviewed-  No results for input(s): "LACTATE", "POCLAC" in the last 72 hours.     Organ dysfunction indicated by Acute kidney injury     Fluid challenge Ideal Body Weight- The patient is obese (BMI>30) and their ideal body weight of Ideal body weight: 73 kg (160 lb 15 oz) will be used to calculate fluid bolus of 30 ml/kg.      Post- resuscitation assessment Yes - I attest a sepsis perfusion exam was performed within 6 hours of sepsis, severe sepsis, or septic shock presentation, following fluid resuscitation.        Will Not start Pressors- Levophed for MAP of 65  Source control achieved by: antibiotic  "

## 2025-05-06 NOTE — ASSESSMENT & PLAN NOTE
Resolved. Lactate, blood pressure, etc within normal limits at time of discharge.     -----  On admission:     Patient with evidence of shock, has been initiated on epinephrine.  Likely in the setting of acute renal failure and bradycardia causing hemodynamic instability.  Has been able to wean down epinephrine with plans to discontinue epinephrine altogether and initiate patient on dopamine if needed for bradycardia or norepinephrine if needed for hypotension.       Severe lactic acidosis.  Likely in the setting of hypoperfusion versus distributive shock.  We will treat with broad-spectrum antibiotics, IV fluids and monitor lactic acidosis.

## 2025-05-06 NOTE — DISCHARGE SUMMARY
[facility] Hospital Medicine  Discharge Summary      Patient Name: Darron Goddard  MRN: [id number]  [facility]: [id number]  Patient Class: IP- Inpatient  Admission Date: 5/1/2025  Hospital Length of Stay: 5 days  Discharge Date and Time: 5/6/2025  2:45 PM  Attending Physician: Jade att. providers found   Discharging Provider: Risa Rob DO  Primary Care Provider: Jade, Primary Doctor    Primary Care Team: Networked reference to record PCT     HPI:   This is an 85-year-old gentleman with past medical history significant for renal lesions (in 2023 the patient had CT abdomen pelvis performed which showed bilateral renal cysts versus mass).  In June of 2023 he had ultrasound kidney as well that showed a solid mass with recommendations for CT or MRI with renal mass protocol in the absence of hydronephrosis.      History was obtained from chart review and speaking with the patient's wife.  In the past, the patient has also reportedly been told that he may have renal malignancy and a nephrectomy was recommended however the patient did not want to get a nephrectomy performed .  He does have a history of prostate cancer in the remote past.     The patient has had blood cultures x2 done at the referring facility.     Per the patient's wife, he has been confused and not eating/drinking over the last 2-3 days.  Today, the patient had a syncopal event for a few sec and has after that the patient's wife drove him to the hospital.  He presented to the hospital at which time he was found to be markedly bradycardic.  He was treated with membrane stabilization medication, found to have hyperkalemia and a decision was made to transfer the patient over to the ICU for potential hemodialysis.  On repeat check, the patient's potassium had improved to 6.1.  He has yet another repeat check pending.  He has evidence of marked leukocytosis, hemoglobin at 11.9 and elevated troponin 65.6 without evidence of chest pain.        The patient arrived to [facility] delirious (received lorazepam on route).  He was hemodynamically stable, continued on epinephrine drip.  I had a very detailed conversation with the patient's wife at bedside.  She shared with me that her  has stated multiple times that he would like to be DNR/DNI and does not want any aggressive measures.  I offered hemodialysis, especially if medical management did not work.  She tells me that based on her 's wishes as well as her decision as the patient's healthcare proxy, they do not wish to proceed with hemodialysis.  They are aware of the potential implications of not getting hemodialysis done especially in the setting of worsening hyperkalemia which may include fatal arrhythmia, cardiac arrest and/or death.    * No surgery found *      Hospital Course:   No notes on file     Goals of Care Treatment Preferences:  Code Status: DNR         Consults:     Assessment & Plan  Hyperkalemia (Resolved: 5/6/2025)  Hyperkalemia is likely due to LATONIA.The patients most recent potassium results are listed below.  Recent Labs     05/04/25  2350 05/05/25  0332 05/06/25  0854   K 3.7 3.7 4.2     The patient's hyperkalemia is resolved. Follow up with primary care for repeat labs.           Acute renal failure (Resolved: 5/6/2025)  LATONIA is likely due to sepsis +/- obstructive uropathy. Of note, patient does have a renal mass suspicious for malignancy. Patient and family decline further evaluation or intervention at this time.     Baseline creatinine is ~2. Most recent creatinine and eGFR are listed below.  Recent Labs     05/04/25  2350 05/05/25  0332 05/06/25  0854   CREATININE 3.43* 2.99* 3.17*   EGFRNORACEVR 17* 20* 18*     LATONIA is improving. Renal function not returned to baseline however has been stable now for 48-72 hours with labs otherwise unremarkable.     Encourage oral hydration, recommend follow up with PCP within a week for repeat labs. Avoid NSAIDs, other nephrotoxic  "agents.     Patient also has appointment to establish care with nephrology (Dr. Gricel Barragan), follow up as scheduled.     Diabetes mellitus  Patient's FSGs are controlled on current medication regimen.  Last A1c reviewed-   Lab Results   Component Value Date    HGBA1C 5.8 05/01/2025     Continue medications per discharge medication reconciliation. Follow up with primary care.    Severe sepsis (Resolved: 5/6/2025)  Sepsis vs derangements secondary to acute renal failure with hypovolemia. No infectious source identified. Blood cultures negative. Did receive course of broad-spectrum antibiotics while inpatient, will not continue antibiotics upon discharge.     -----  On admission:     This patient does have evidence of infective focus  My overall impression is sepsis.  Source: Unknown  Antibiotics given-   Antibiotics (72h ago, onward)        Start     Stop Route Frequency Ordered     05/01/25 2100   mupirocin 2 % ointment         05/06/25 2059 Nasl 2 times daily 05/01/25 1649     05/01/25 1745   ceFEPIme injection 1 g         05/06/25 1744 IV Every 12 hours (non-standard times) 05/01/25 1647             Latest lactate reviewed-  No results for input(s): "LACTATE", "POCLAC" in the last 72 hours.     Organ dysfunction indicated by Acute kidney injury     Fluid challenge Ideal Body Weight- The patient is obese (BMI>30) and their ideal body weight of Ideal body weight: 73 kg (160 lb 15 oz) will be used to calculate fluid bolus of 30 ml/kg.      Post- resuscitation assessment Yes - I attest a sepsis perfusion exam was performed within 6 hours of sepsis, severe sepsis, or septic shock presentation, following fluid resuscitation.        Will Not start Pressors- Levophed for MAP of 65  Source control achieved by: antibiotic  Elevated troponin (Resolved: 5/6/2025)  Elevated troponin at outside hospital. Reviewed EKG with no evidence of ST-elevation. Likely in the setting of acute renal failure. Patient with no evidence of " chest pain.     Shock (Resolved: 5/3/2025)  Resolved. Lactate, blood pressure, etc within normal limits at time of discharge.     -----  On admission:     Patient with evidence of shock, has been initiated on epinephrine.  Likely in the setting of acute renal failure and bradycardia causing hemodynamic instability.  Has been able to wean down epinephrine with plans to discontinue epinephrine altogether and initiate patient on dopamine if needed for bradycardia or norepinephrine if needed for hypotension.       Severe lactic acidosis.  Likely in the setting of hypoperfusion versus distributive shock.  We will treat with broad-spectrum antibiotics, IV fluids and monitor lactic acidosis.  Final Active Diagnoses:    Diagnosis Date Noted POA    Diabetes mellitus [E11.9]  Yes      Problems Resolved During this Admission:    Diagnosis Date Noted Date Resolved POA    PRINCIPAL PROBLEM:  Hyperkalemia [E87.5] 06/07/2023 05/06/2025 Yes    Delirium [R41.0] 05/01/2025 05/03/2025 Yes    Shock [R57.9] 05/01/2025 05/03/2025 Yes    Severe sepsis [A41.9, R65.20] 05/01/2025 05/06/2025 Yes    Elevated troponin [R79.89] 05/01/2025 05/06/2025 Yes    Acute renal failure [N17.9] 06/07/2023 05/06/2025 Yes    Metabolic acidosis [E87.20] 06/07/2023 05/06/2025 Yes       Discharged Condition: stable    Disposition: Home or Self Care    Follow Up:   Follow-up Information       Gricel Barragan DO Follow up on 8/18/2025.    Specialty: Nephrology  Why: Follow up as scheduled 08/18/2025 at 1:00 pm  Contact information:  [address] [place] [address]  [phone]               [facility], [facility]. Schedule an appointment as soon as possible for a visit in 1 week(s).    Contact information:  [address] [facility] [address]  [phone]               Yuliya Amaro NP Follow up.    Specialty: Family Medicine  Why: Please follow up on May 14 at 1:00 pm  Contact information:  [place]  [id number] [phone]                           Patient Instructions:      Notify your health care provider if you experience any of the following:  temperature >100.4     Notify your health care provider if you experience any of the following:  persistent nausea and vomiting or diarrhea     Notify your health care provider if you experience any of the following:  severe uncontrolled pain     Notify your health care provider if you experience any of the following:  redness, tenderness, or signs of infection (pain, swelling, redness, odor or green/yellow discharge around incision site)     Notify your health care provider if you experience any of the following:  difficulty breathing or increased cough     Notify your health care provider if you experience any of the following:  severe persistent headache     Notify your health care provider if you experience any of the following:  worsening rash     Notify your health care provider if you experience any of the following:  persistent dizziness, light-headedness, or visual disturbances     Notify your health care provider if you experience any of the following:  increased confusion or weakness     Activity as tolerated       Significant Diagnostic Studies: N/A    Pending Diagnostic Studies:       Procedure Component Value Units Date/Time    EXTRA TUBES [[id number]] Collected: 05/04/25 1215    Order Status: Sent Lab Status: In process Updated: 05/04/25 1219    Specimen: Blood, Venous     Narrative:      The following orders were created for panel order EXTRA TUBES.  Procedure                               Abnormality         Status                     ---------                               -----------         ------                     Lavender Top Hold[[id number]]                               In process                   Please view results for these tests on the individual orders.    EXTRA TUBES [[id number]] Collected: 05/02/25 2326    Order Status: Sent Lab  Status: In process Updated: 05/02/25 7509    Specimen: Blood, Venous     Narrative:      The following orders were created for panel order EXTRA TUBES.  Procedure                               Abnormality         Status                     ---------                               -----------         ------                     Lavender Top Hold[[id number]]                               In process                   Please view results for these tests on the individual orders.           Medications:  Reconciled Home Medications:      Medication List        PAUSE taking these medications      FARXIGA 5 mg Tab tablet  Wait to take this until your doctor or other care provider tells you to start again.  Generic drug: dapagliflozin propanediol  Take 5 mg by mouth.            CONTINUE taking these medications      albuterol sulfate 90 mcg/actuation inhaler  Commonly known as: PROAIR RESPICLICK  Inhale 2 puffs into the lungs every 6 (six) hours as needed for Wheezing. Rescue     amLODIPine 10 MG tablet  Commonly known as: NORVASC  Take 10 mg by mouth once daily.     aspirin 81 MG EC tablet  Commonly known as: ECOTRIN  Take 81 mg by mouth once daily.     atenoloL 50 MG tablet  Commonly known as: TENORMIN  Take 50 mg by mouth once daily.     atorvastatin 40 MG tablet  Commonly known as: LIPITOR  Take 40 mg by mouth once daily.     ezetimibe 10 mg tablet  Commonly known as: ZETIA  Take 10 mg by mouth once daily.     glipiZIDE 5 MG tablet  Commonly known as: GLUCOTROL  Take 0.5 tablets (2.5 mg total) by mouth 2 (two) times daily before meals.     niacin 500 MG Cpsr  Take 250 mg by mouth every evening.     pantoprazole 40 MG tablet  Commonly known as: PROTONIX  Take 1 tablet (40 mg total) by mouth once daily.            STOP taking these medications      cefUROXime 250 MG tablet  Commonly known as: CEFTIN              Indwelling Lines/Drains at time of discharge:   Lines/Drains/Airways       None                   Time spent on  the discharge of patient: 40 minutes         Risa Rob DO  Department of Hospital Medicine  [facility]

## 2025-05-06 NOTE — DISCHARGE INSTRUCTIONS
If you are still taking Farxiga at home, HOLD this medication until you follow up with your primary care provider. You may resume all other home medications tomorrow.     Recommend follow up with primary care for repeat labs within one week.

## 2025-05-06 NOTE — PLAN OF CARE
[facility]  Discharge Final Note    Primary Care Provider: No, Primary Doctor    Expected Discharge Date: 5/6/2025    Final Discharge Note (most recent)       Final Note - 05/06/25 1523          Final Note    Assessment Type Final Discharge Note     Anticipated Discharge Disposition Home or Self Care     What phone number can be called within the next 1-3 days to see how you are doing after discharge? [phone]        Post-Acute Status    Coverage BCBS OOS/Medicare A     Discharge Delays None known at this time                     Important Message from Medicare  Important Message from Medicare regarding Discharge Appeal Rights: Explained to patient/caregiver, Signed/date by patient/caregiver     Date IMM was signed: 05/05/25  Time IMM was signed: 1100    Contact Info       Gricel Barragan DO   Specialty: Nephrology    [address] [place] [address]   Phone: [phone]       Next Steps: Follow up on 8/18/2025    Instructions: Follow up as scheduled 08/18/2025 at 1:00 pm    [facility]    [address] [facility] [address]   Phone: [phone]       Next Steps: Schedule an appointment as soon as possible for a visit in 1 week(s)    Yuliya Amaro, NP   Specialty: Family Medicine    [address] [facility] [address]   Phone: [phone]       Next Steps: Follow up    Instructions: Please follow up on May 14 at 1:00 pm          Patient will be discharged home with family today.  IM current.  No further needs.

## 2025-05-06 NOTE — PT/OT/SLP PROGRESS
Physical Therapy Treatment    Patient Name:  Darron Goddard   MRN:  [id number]    Recommendations:     Discharge Recommendations: Low Intensity Therapy  Discharge Equipment Recommendations: none  Barriers to discharge: ongoing medical treatment    Assessment:     Darron Goddard is a 85 y.o. male admitted with a medical diagnosis of Hyperkalemia.  He presents with the following impairments/functional limitations: impaired endurance, impaired self care skills, impaired skin.    Pt with increased gait trials with episodes of knee buckling during same. Pt with possible discharge this date    PT POC discussed with Vinicio MartellPT      Rehab Prognosis: Good and Fair; patient would benefit from acute skilled PT services to address these deficits and reach maximum level of function.    Recent Surgery: * No surgery found *      Plan:     During this hospitalization, patient to be seen 5 x/week to address the identified rehab impairments via gait training, therapeutic activities, therapeutic exercises and progress toward the following goals:    Plan of Care Expires:  06/05/25    Subjective     Chief Complaint:   Patient/Family Comments/goals: pt agreeable  Pain/Comfort:         Objective:     Communicated with ANGUS Gerard prior to session.  Patient found HOB elevated with peripheral IV, pulse ox (continuous) upon PT entry to room.     General Precautions: Standard, fall  Orthopedic Precautions: N/A  Braces: N/A  Respiratory Status: Room air     Functional Mobility:  Bed Mobility:     Supine to Sit: stand by assistance and contact guard assistance  Transfers:     Sit to Stand:  contact guard assistance with rolling walker and standby to contact guard assist static stance with RW  Gait: 38' x 2 trials with RW and contact guard assist; slow carmina, verbal cueing to manage RW, increased fatigue facilitating a seated rest break, three episodes of knee buckling      AM-PAC 6 CLICK MOBILITY  Turning over in bed (including  adjusting bedclothes, sheets and blankets)?: 4  Sitting down on and standing up from a chair with arms (e.g., wheelchair, bedside commode, etc.): 4  Moving from lying on back to sitting on the side of the bed?: 4  Moving to and from a bed to a chair (including a wheelchair)?: 4  Need to walk in hospital room?: 3  Climbing 3-5 steps with a railing?: 3  Basic Mobility Total Score: 22       Treatment & Education:  Pt performed 2 x 15 reps (B) LE exercises: ap, quad set, glut set, straight leg raise, hip ab/adduction, long arc quad, heel slide with active assist       Patient left up in chair with all lines intact, call button in reach, and spouse present..    GOALS:   Multidisciplinary Problems       Physical Therapy Goals          Problem: Physical Therapy    Goal Priority Disciplines Outcome Interventions   Physical Therapy Goal     PT, PT/OT Progressing    Description: Short term goals:  1. Supine to sit with Modified [place]  2. Sit to stand transfer with Modified [place]  3. Bed to chair transfer with Modified [place] using Rolling Walker  4. Gait  x 100 feet with Modified [place] using Rolling Walker.     Long term goals:  Pt will return home to [facility] with all mobility                         DME Justifications:      Time Tracking:     PT Received On: 05/06/25  PT Start Time: 1058     PT Stop Time: 1129  PT Total Time (min): 31 min     Billable Minutes: Gait Training 15 and Therapeutic Exercise 15    Treatment Type: Treatment  PT/PTA: PTA     Number of PTA visits since last PT visit: 1 05/06/2025

## 2025-05-06 NOTE — ASSESSMENT & PLAN NOTE
Hyperkalemia is likely due to LATONIA.The patients most recent potassium results are listed below.  Recent Labs     05/04/25  2350 05/05/25  0332 05/06/25  0854   K 3.7 3.7 4.2     The patient's hyperkalemia is resolved. Follow up with primary care for repeat labs.

## 2025-05-07 LAB
BACTERIA BLD CULT: NORMAL
BACTERIA BLD CULT: NORMAL

## 2025-05-08 ENCOUNTER — PATIENT OUTREACH (OUTPATIENT)
Dept: ADMINISTRATIVE | Facility: CLINIC | Age: 85
End: 2025-05-08

## 2025-05-08 NOTE — PROGRESS NOTES
C3 nurse attempted to contact Darron Goddard  for a TCC post hospital discharge follow up call. No answer. LVM with callback information . The patient has a scheduled HOSFU appointment with JAUN Araiza on 5/14/25 @ 1300.

## 2025-08-05 ENCOUNTER — TELEPHONE (OUTPATIENT)
Dept: NEPHROLOGY | Facility: CLINIC | Age: 85
End: 2025-08-05
Payer: COMMERCIAL

## 2025-08-05 NOTE — TELEPHONE ENCOUNTER
Attempted to call pt regarding 8/18 appt needing to be r/s d/t  being out of office. No ans, VM left